# Patient Record
Sex: FEMALE | Race: WHITE | Employment: UNEMPLOYED | URBAN - METROPOLITAN AREA
[De-identification: names, ages, dates, MRNs, and addresses within clinical notes are randomized per-mention and may not be internally consistent; named-entity substitution may affect disease eponyms.]

---

## 2017-02-08 ENCOUNTER — HOSPITAL ENCOUNTER (EMERGENCY)
Age: 27
Discharge: HOME | End: 2017-02-08
Attending: EMERGENCY MEDICINE | Admitting: EMERGENCY MEDICINE
Payer: COMMERCIAL

## 2017-02-08 ENCOUNTER — APPOINTMENT (OUTPATIENT)
Dept: GENERAL RADIOLOGY | Age: 27
End: 2017-02-08
Attending: PHYSICIAN ASSISTANT
Payer: COMMERCIAL

## 2017-02-08 VITALS
SYSTOLIC BLOOD PRESSURE: 119 MMHG | DIASTOLIC BLOOD PRESSURE: 83 MMHG | RESPIRATION RATE: 18 BRPM | TEMPERATURE: 97.7 F | OXYGEN SATURATION: 98 % | HEART RATE: 118 BPM | WEIGHT: 181 LBS | BODY MASS INDEX: 29.09 KG/M2 | HEIGHT: 66 IN

## 2017-02-08 DIAGNOSIS — B34.9 VIRAL SYNDROME: Primary | ICD-10-CM

## 2017-02-08 LAB
ALBUMIN SERPL BCP-MCNC: 3.9 G/DL (ref 3.5–5)
ALBUMIN/GLOB SERPL: 1.1 {RATIO} (ref 1.1–2.2)
ALP SERPL-CCNC: 47 U/L (ref 45–117)
ALT SERPL-CCNC: 37 U/L (ref 12–78)
ANION GAP BLD CALC-SCNC: 6 MMOL/L (ref 5–15)
APPEARANCE UR: CLEAR
AST SERPL W P-5'-P-CCNC: 27 U/L (ref 15–37)
BACTERIA URNS QL MICRO: ABNORMAL /HPF
BASOPHILS # BLD AUTO: 0 K/UL (ref 0–0.1)
BASOPHILS # BLD: 0 % (ref 0–1)
BILIRUB SERPL-MCNC: 0.3 MG/DL (ref 0.2–1)
BILIRUB UR QL: NEGATIVE
BUN SERPL-MCNC: 7 MG/DL (ref 6–20)
BUN/CREAT SERPL: 7 (ref 12–20)
CALCIUM SERPL-MCNC: 8.5 MG/DL (ref 8.5–10.1)
CHLORIDE SERPL-SCNC: 109 MMOL/L (ref 97–108)
CO2 SERPL-SCNC: 25 MMOL/L (ref 21–32)
COLOR UR: ABNORMAL
CREAT SERPL-MCNC: 0.99 MG/DL (ref 0.55–1.02)
DIFFERENTIAL METHOD BLD: ABNORMAL
EOSINOPHIL # BLD: 0.1 K/UL (ref 0–0.4)
EOSINOPHIL NFR BLD: 2 % (ref 0–7)
EPITH CASTS URNS QL MICRO: ABNORMAL /LPF
ERYTHROCYTE [DISTWIDTH] IN BLOOD BY AUTOMATED COUNT: 13.3 % (ref 11.5–14.5)
FLUAV AG NPH QL IA: NEGATIVE
FLUBV AG NOSE QL IA: NEGATIVE
GLOBULIN SER CALC-MCNC: 3.7 G/DL (ref 2–4)
GLUCOSE SERPL-MCNC: 111 MG/DL (ref 65–100)
GLUCOSE UR STRIP.AUTO-MCNC: NEGATIVE MG/DL
HCG UR QL: NEGATIVE
HCT VFR BLD AUTO: 43.4 % (ref 35–47)
HGB BLD-MCNC: 14.1 G/DL (ref 11.5–16)
HGB UR QL STRIP: ABNORMAL
KETONES UR QL STRIP.AUTO: NEGATIVE MG/DL
LEUKOCYTE ESTERASE UR QL STRIP.AUTO: NEGATIVE
LYMPHOCYTES # BLD AUTO: 11 % (ref 12–49)
LYMPHOCYTES # BLD: 0.8 K/UL (ref 0.8–3.5)
MCH RBC QN AUTO: 29.7 PG (ref 26–34)
MCHC RBC AUTO-ENTMCNC: 32.5 G/DL (ref 30–36.5)
MCV RBC AUTO: 91.6 FL (ref 80–99)
MONOCYTES # BLD: 0.6 K/UL (ref 0–1)
MONOCYTES NFR BLD AUTO: 9 % (ref 5–13)
MUCOUS THREADS URNS QL MICRO: ABNORMAL /LPF
NEUTS SEG # BLD: 5.4 K/UL (ref 1.8–8)
NEUTS SEG NFR BLD AUTO: 78 % (ref 32–75)
NITRITE UR QL STRIP.AUTO: NEGATIVE
PH UR STRIP: 5.5 [PH] (ref 5–8)
PLATELET # BLD AUTO: 227 K/UL (ref 150–400)
POTASSIUM SERPL-SCNC: 3.4 MMOL/L (ref 3.5–5.1)
PROT SERPL-MCNC: 7.6 G/DL (ref 6.4–8.2)
PROT UR STRIP-MCNC: 30 MG/DL
RBC # BLD AUTO: 4.74 M/UL (ref 3.8–5.2)
RBC #/AREA URNS HPF: ABNORMAL /HPF (ref 0–5)
RBC MORPH BLD: ABNORMAL
SODIUM SERPL-SCNC: 140 MMOL/L (ref 136–145)
SP GR UR REFRACTOMETRY: 1.01 (ref 1–1.03)
UA: UC IF INDICATED,UAUC: ABNORMAL
UROBILINOGEN UR QL STRIP.AUTO: 1 EU/DL (ref 0.2–1)
WBC # BLD AUTO: 6.9 K/UL (ref 3.6–11)
WBC URNS QL MICRO: ABNORMAL /HPF (ref 0–4)

## 2017-02-08 PROCEDURE — 87804 INFLUENZA ASSAY W/OPTIC: CPT | Performed by: PHYSICIAN ASSISTANT

## 2017-02-08 PROCEDURE — 81025 URINE PREGNANCY TEST: CPT | Performed by: EMERGENCY MEDICINE

## 2017-02-08 PROCEDURE — 85025 COMPLETE CBC W/AUTO DIFF WBC: CPT | Performed by: EMERGENCY MEDICINE

## 2017-02-08 PROCEDURE — 99283 EMERGENCY DEPT VISIT LOW MDM: CPT

## 2017-02-08 PROCEDURE — 80053 COMPREHEN METABOLIC PANEL: CPT | Performed by: EMERGENCY MEDICINE

## 2017-02-08 PROCEDURE — 87086 URINE CULTURE/COLONY COUNT: CPT | Performed by: EMERGENCY MEDICINE

## 2017-02-08 PROCEDURE — 74011250636 HC RX REV CODE- 250/636: Performed by: PHYSICIAN ASSISTANT

## 2017-02-08 PROCEDURE — 71020 XR CHEST PA LAT: CPT

## 2017-02-08 PROCEDURE — 96360 HYDRATION IV INFUSION INIT: CPT

## 2017-02-08 PROCEDURE — 81001 URINALYSIS AUTO W/SCOPE: CPT | Performed by: EMERGENCY MEDICINE

## 2017-02-08 PROCEDURE — 36415 COLL VENOUS BLD VENIPUNCTURE: CPT | Performed by: EMERGENCY MEDICINE

## 2017-02-08 RX ORDER — SODIUM CHLORIDE 0.9 % (FLUSH) 0.9 %
5-10 SYRINGE (ML) INJECTION AS NEEDED
Status: DISCONTINUED | OUTPATIENT
Start: 2017-02-08 | End: 2017-02-08 | Stop reason: HOSPADM

## 2017-02-08 RX ORDER — ONDANSETRON 4 MG/1
4 TABLET, ORALLY DISINTEGRATING ORAL
Qty: 10 TAB | Refills: 0 | Status: SHIPPED | OUTPATIENT
Start: 2017-02-08 | End: 2017-06-20

## 2017-02-08 RX ORDER — SODIUM CHLORIDE 0.9 % (FLUSH) 0.9 %
5-10 SYRINGE (ML) INJECTION EVERY 8 HOURS
Status: DISCONTINUED | OUTPATIENT
Start: 2017-02-08 | End: 2017-02-08 | Stop reason: HOSPADM

## 2017-02-08 RX ADMIN — SODIUM CHLORIDE 1000 ML: 900 INJECTION, SOLUTION INTRAVENOUS at 13:55

## 2017-02-08 NOTE — LETTER
[address] 
\[facility]\"" EMERGENCY DEPT 
[address] [place] [address] [id number] [phone] Work/School Note Date: 2/8/2017 To Whom It May concern: 
 
Heriberto Charles was seen and treated today in the emergency room by the following provider(s): 
Attending Provider: Tuan Lanier MD 
Physician Assistant: FANNIE Mckeon. Please excuse Heriberto Charles from work today and tomorrow. Sincerely, Nathaniel Diaz, [phone]

## 2017-02-08 NOTE — ED PROVIDER NOTES
HPI Comments: Rolando Suarez is a 27y.o. year old female that presents to the ED with multiple medical complaints. Pt notes she began feeling ill about 4 days ago. She has had subjective fevers, rhinorrhea, dry cough and vomiting. Pt has tried taking over the counter cough/cold medications without relief. Pt denies all other complaints. Denies sick contacts. Denies potentially rancid foods. Pt did not receive a flu vaccine this year. PCP: Giulia Jorge MD  Social Hx: Tob (denies), EtOH (denies), Drug Use (denies)     Patient is a 27 y.o. female presenting with vomiting and fever. The history is provided by the patient. Vomiting    Associated symptoms include a fever and cough. Pertinent negatives include no chills, no abdominal pain and no diarrhea. Fever    Associated symptoms include vomiting, congestion and cough. Pertinent negatives include no chest pain, no diarrhea, no sore throat, no shortness of breath and no rash. Past Medical History:   Diagnosis Date    Other ill-defined conditions(989.89)      kidney infections    Other ill-defined conditions(799.89)      Migraines       Past Surgical History:   Procedure Laterality Date    Hx orthopaedic       pelvic bone fx         History reviewed. No pertinent family history. Social History     Social History    Marital status: SINGLE     Spouse name: N/A    Number of children: N/A    Years of education: N/A     Occupational History    Not on file. Social History Main Topics    Smoking status: Never Smoker    Smokeless tobacco: Never Used    Alcohol use No    Drug use: No    Sexual activity: Yes     Birth control/ protection: Injection     Other Topics Concern    Not on file     Social History Narrative         ALLERGIES: Ciprofloxacin and Pcn [penicillins]    Review of Systems   Constitutional: Positive for fever. Negative for chills. HENT: Positive for congestion and rhinorrhea. Negative for ear pain and sore throat. Respiratory: Positive for cough. Negative for shortness of breath. Cardiovascular: Negative for chest pain. Gastrointestinal: Positive for nausea and vomiting. Negative for abdominal pain, constipation and diarrhea. Genitourinary: Negative. Negative for dysuria, frequency, hematuria, vaginal bleeding, vaginal discharge and vaginal pain. Skin: Negative. Negative for rash. All other systems reviewed and are negative. Vitals:    02/08/17 1152 02/08/17 1245   BP: 120/90 (!) 109/92   Pulse: (!) 105 (!) 118   Resp: 16 16   Temp: 97.7 °F (36.5 °C)    SpO2: 99% 98%   Weight: 82.1 kg (181 lb)    Height: 5' 6\" (1.676 m)             Physical Exam   Constitutional: She is oriented to person, place, and time. She appears well-developed and well-nourished. No distress. 26 yo  female   HENT:   Head: Normocephalic and atraumatic. Right Ear: Tympanic membrane, external ear and ear canal normal. No middle ear effusion. Left Ear: Tympanic membrane, external ear and ear canal normal.  No middle ear effusion. Nose: Rhinorrhea present. Right sinus exhibits no maxillary sinus tenderness and no frontal sinus tenderness. Left sinus exhibits no maxillary sinus tenderness and no frontal sinus tenderness. Mouth/Throat: Uvula is midline and mucous membranes are normal. No oropharyngeal exudate, posterior oropharyngeal edema or posterior oropharyngeal erythema. Eyes: Conjunctivae are normal. Right eye exhibits no discharge. Left eye exhibits no discharge. Neck: Normal range of motion. Neck supple. Cardiovascular: Normal rate, regular rhythm and normal heart sounds. No murmur heard. Pulmonary/Chest: Effort normal and breath sounds normal. No respiratory distress. She has no wheezes. Abdominal: Soft. Bowel sounds are normal. She exhibits no distension. There is no tenderness. There is no rebound and no guarding. Lymphadenopathy:     She has no cervical adenopathy.    Neurological: She is alert and oriented to person, place, and time. Skin: Skin is warm and dry. She is not diaphoretic. Psychiatric: She has a normal mood and affect. Her behavior is normal.   Nursing note and vitals reviewed. MDM  Number of Diagnoses or Management Options  Diagnosis management comments: DDx - gastroenteritis, food poisoning, viral syndrome, influenza, UTI, URI       Amount and/or Complexity of Data Reviewed  Clinical lab tests: ordered and reviewed      ED Course       Procedures    DISCHARGE NOTE:  2:00 PM  The care plan has been outline with the patient and/or family, who verbally conveyed understanding and agreement. Available results have been reviewed. Patient and/or family understand the follow up plan as outlined and discharge instructions. Should their condition deterioration at any time after discharge the patient agrees to return, follow up sooner than outlined or seek medical assistance at the closest Emergency Room as soon as possible. Questions have been answered. Discharge instructions and educational information regarding the patient's diagnosis as well a list of reasons why the patient would want to seek immediate medical attention, should their condition change, were reviewed directly with the patient/family     LABS COMPLETED AND REVIEWED:  Recent Results (from the past 12 hour(s))   CBC WITH AUTOMATED DIFF    Collection Time: 02/08/17 12:00 PM   Result Value Ref Range    WBC 6.9 3.6 - 11.0 K/uL    RBC 4.74 3.80 - 5.20 M/uL    HGB 14.1 11.5 - 16.0 g/dL    HCT 43.4 35.0 - 47.0 %    MCV 91.6 80.0 - 99.0 FL    MCH 29.7 26.0 - 34.0 PG    MCHC 32.5 30.0 - 36.5 g/dL    RDW 13.3 11.5 - 14.5 %    PLATELET 310 861 - 943 K/uL    NEUTROPHILS 78 (H) 32 - 75 %    LYMPHOCYTES 11 (L) 12 - 49 %    MONOCYTES 9 5 - 13 %    EOSINOPHILS 2 0 - 7 %    BASOPHILS 0 0 - 1 %    ABS. NEUTROPHILS 5.4 1.8 - 8.0 K/UL    ABS. LYMPHOCYTES 0.8 0.8 - 3.5 K/UL    ABS. MONOCYTES 0.6 0.0 - 1.0 K/UL    ABS.  EOSINOPHILS 0.1 0.0 - 0.4 K/UL    ABS. BASOPHILS 0.0 0.0 - 0.1 K/UL    DF SMEAR SCANNED      RBC COMMENTS NORMOCYTIC, NORMOCHROMIC     METABOLIC PANEL, COMPREHENSIVE    Collection Time: 02/08/17 12:00 PM   Result Value Ref Range    Sodium 140 136 - 145 mmol/L    Potassium 3.4 (L) 3.5 - 5.1 mmol/L    Chloride 109 (H) 97 - 108 mmol/L    CO2 25 21 - 32 mmol/L    Anion gap 6 5 - 15 mmol/L    Glucose 111 (H) 65 - 100 mg/dL    BUN 7 6 - 20 MG/DL    Creatinine 0.99 0.55 - 1.02 MG/DL    BUN/Creatinine ratio 7 (L) 12 - 20      GFR est AA >60 >60 ml/min/1.73m2    GFR est non-AA >60 >60 ml/min/1.73m2    Calcium 8.5 8.5 - 10.1 MG/DL    Bilirubin, total 0.3 0.2 - 1.0 MG/DL    ALT (SGPT) 37 12 - 78 U/L    AST (SGOT) 27 15 - 37 U/L    Alk.  phosphatase 47 45 - 117 U/L    Protein, total 7.6 6.4 - 8.2 g/dL    Albumin 3.9 3.5 - 5.0 g/dL    Globulin 3.7 2.0 - 4.0 g/dL    A-G Ratio 1.1 1.1 - 2.2     URINALYSIS W/ REFLEX CULTURE    Collection Time: 02/08/17 12:10 PM   Result Value Ref Range    Color YELLOW/STRAW      Appearance CLEAR CLEAR      Specific gravity 1.015 1.003 - 1.030      pH (UA) 5.5 5.0 - 8.0      Protein 30 (A) NEG mg/dL    Glucose NEGATIVE  NEG mg/dL    Ketone NEGATIVE  NEG mg/dL    Bilirubin NEGATIVE  NEG      Blood MODERATE (A) NEG      Urobilinogen 1.0 0.2 - 1.0 EU/dL    Nitrites NEGATIVE  NEG      Leukocyte Esterase NEGATIVE  NEG      WBC 5-10 0 - 4 /hpf    RBC 5-10 0 - 5 /hpf    Epithelial cells FEW FEW /lpf    Bacteria 1+ (A) NEG /hpf    UA:UC IF INDICATED URINE CULTURE ORDERED (A) CNI      Mucus 1+ (A) NEG /lpf   HCG URINE, QL    Collection Time: 02/08/17 12:10 PM   Result Value Ref Range    HCG urine, Ql. NEGATIVE  NEG     INFLUENZA A & B AG (RAPID TEST)    Collection Time: 02/08/17  1:25 PM   Result Value Ref Range    Influenza A Antigen NEGATIVE  NEG      Influenza B Antigen NEGATIVE  NEG         IMAGING COMPLETED AND REVIEWED:  INDICATION: cough/fever      COMPARISON: 10/23/2014     FINDINGS: PA and lateral views of the chest demonstrate a stable  cardiomediastinal silhouette and clear lungs bilaterally. The visualized osseous  structures are unremarkable.     IMPRESSION  IMPRESSION: No acute process    CLINICAL IMPRESSION:  1. Viral syndrome        Plan:  Over the counter cough/cold medication as needed  Discharge Medication List as of 2/8/2017  3:59 PM      START taking these medications    Details   ondansetron (ZOFRAN ODT) 4 mg disintegrating tablet Take 1 Tab by mouth every eight (8) hours as needed for Nausea. , Print, Disp-10 Tab, R-0           Follow-up Information     Follow up With Details Comments Contact Info    Your PCP In 2 days As needed         Return to the closest emergency room or follow up sooner for any deterioration

## 2017-02-10 LAB
BACTERIA SPEC CULT: NORMAL
CC UR VC: NORMAL
SERVICE CMNT-IMP: NORMAL

## 2017-06-20 ENCOUNTER — APPOINTMENT (OUTPATIENT)
Dept: ULTRASOUND IMAGING | Age: 27
End: 2017-06-20
Attending: EMERGENCY MEDICINE
Payer: COMMERCIAL

## 2017-06-20 ENCOUNTER — HOSPITAL ENCOUNTER (EMERGENCY)
Age: 27
Discharge: HOME | End: 2017-06-20
Attending: EMERGENCY MEDICINE | Admitting: EMERGENCY MEDICINE
Payer: COMMERCIAL

## 2017-06-20 VITALS
RESPIRATION RATE: 12 BRPM | BODY MASS INDEX: 29.76 KG/M2 | WEIGHT: 185.19 LBS | TEMPERATURE: 98 F | HEART RATE: 59 BPM | DIASTOLIC BLOOD PRESSURE: 80 MMHG | OXYGEN SATURATION: 100 % | SYSTOLIC BLOOD PRESSURE: 114 MMHG | HEIGHT: 66 IN

## 2017-06-20 DIAGNOSIS — N83.202 CYST OF LEFT OVARY: ICD-10-CM

## 2017-06-20 DIAGNOSIS — N92.1 MENORRHAGIA WITH IRREGULAR CYCLE: Primary | ICD-10-CM

## 2017-06-20 LAB
ALBUMIN SERPL BCP-MCNC: 3.2 G/DL (ref 3.5–5)
ALBUMIN/GLOB SERPL: 0.9 {RATIO} (ref 1.1–2.2)
ALP SERPL-CCNC: 43 U/L (ref 45–117)
ALT SERPL-CCNC: 19 U/L (ref 12–78)
ANION GAP BLD CALC-SCNC: 7 MMOL/L (ref 5–15)
APPEARANCE UR: CLEAR
AST SERPL W P-5'-P-CCNC: 11 U/L (ref 15–37)
BACTERIA URNS QL MICRO: NEGATIVE /HPF
BASOPHILS # BLD AUTO: 0 K/UL (ref 0–0.1)
BASOPHILS # BLD: 0 % (ref 0–1)
BILIRUB SERPL-MCNC: 0.2 MG/DL (ref 0.2–1)
BILIRUB UR QL: NEGATIVE
BUN SERPL-MCNC: 10 MG/DL (ref 6–20)
BUN/CREAT SERPL: 13 (ref 12–20)
CALCIUM SERPL-MCNC: 8.3 MG/DL (ref 8.5–10.1)
CHLORIDE SERPL-SCNC: 108 MMOL/L (ref 97–108)
CLUE CELLS VAG QL WET PREP: NORMAL
CO2 SERPL-SCNC: 25 MMOL/L (ref 21–32)
COLOR UR: ABNORMAL
CREAT SERPL-MCNC: 0.78 MG/DL (ref 0.55–1.02)
EOSINOPHIL # BLD: 0.2 K/UL (ref 0–0.4)
EOSINOPHIL NFR BLD: 3 % (ref 0–7)
EPITH CASTS URNS QL MICRO: ABNORMAL /LPF
ERYTHROCYTE [DISTWIDTH] IN BLOOD BY AUTOMATED COUNT: 12.9 % (ref 11.5–14.5)
GLOBULIN SER CALC-MCNC: 3.4 G/DL (ref 2–4)
GLUCOSE SERPL-MCNC: 73 MG/DL (ref 65–100)
GLUCOSE UR STRIP.AUTO-MCNC: NEGATIVE MG/DL
HCG UR QL: NEGATIVE
HCT VFR BLD AUTO: 37.8 % (ref 35–47)
HGB BLD-MCNC: 12.6 G/DL (ref 11.5–16)
HGB UR QL STRIP: ABNORMAL
HYALINE CASTS URNS QL MICRO: ABNORMAL /LPF (ref 0–5)
KETONES UR QL STRIP.AUTO: NEGATIVE MG/DL
KOH PREP SPEC: NORMAL
LEUKOCYTE ESTERASE UR QL STRIP.AUTO: ABNORMAL
LYMPHOCYTES # BLD AUTO: 21 % (ref 12–49)
LYMPHOCYTES # BLD: 1.5 K/UL (ref 0.8–3.5)
MCH RBC QN AUTO: 30.4 PG (ref 26–34)
MCHC RBC AUTO-ENTMCNC: 33.3 G/DL (ref 30–36.5)
MCV RBC AUTO: 91.1 FL (ref 80–99)
MONOCYTES # BLD: 0.5 K/UL (ref 0–1)
MONOCYTES NFR BLD AUTO: 7 % (ref 5–13)
NEUTS SEG # BLD: 4.7 K/UL (ref 1.8–8)
NEUTS SEG NFR BLD AUTO: 69 % (ref 32–75)
NITRITE UR QL STRIP.AUTO: NEGATIVE
PH UR STRIP: 5.5 [PH] (ref 5–8)
PLATELET # BLD AUTO: 221 K/UL (ref 150–400)
POTASSIUM SERPL-SCNC: 3.4 MMOL/L (ref 3.5–5.1)
PROT SERPL-MCNC: 6.6 G/DL (ref 6.4–8.2)
PROT UR STRIP-MCNC: NEGATIVE MG/DL
RBC # BLD AUTO: 4.15 M/UL (ref 3.8–5.2)
RBC #/AREA URNS HPF: >100 /HPF (ref 0–5)
SERVICE CMNT-IMP: NORMAL
SODIUM SERPL-SCNC: 140 MMOL/L (ref 136–145)
SP GR UR REFRACTOMETRY: 1.01 (ref 1–1.03)
T VAGINALIS VAG QL WET PREP: NORMAL
UROBILINOGEN UR QL STRIP.AUTO: 0.2 EU/DL (ref 0.2–1)
WBC # BLD AUTO: 6.9 K/UL (ref 3.6–11)
WBC URNS QL MICRO: ABNORMAL /HPF (ref 0–4)

## 2017-06-20 PROCEDURE — 74011250636 HC RX REV CODE- 250/636: Performed by: EMERGENCY MEDICINE

## 2017-06-20 PROCEDURE — 76830 TRANSVAGINAL US NON-OB: CPT

## 2017-06-20 PROCEDURE — 81025 URINE PREGNANCY TEST: CPT

## 2017-06-20 PROCEDURE — 81001 URINALYSIS AUTO W/SCOPE: CPT | Performed by: EMERGENCY MEDICINE

## 2017-06-20 PROCEDURE — 80053 COMPREHEN METABOLIC PANEL: CPT | Performed by: EMERGENCY MEDICINE

## 2017-06-20 PROCEDURE — 96375 TX/PRO/DX INJ NEW DRUG ADDON: CPT

## 2017-06-20 PROCEDURE — 85025 COMPLETE CBC W/AUTO DIFF WBC: CPT | Performed by: EMERGENCY MEDICINE

## 2017-06-20 PROCEDURE — 96374 THER/PROPH/DIAG INJ IV PUSH: CPT

## 2017-06-20 PROCEDURE — 87491 CHLMYD TRACH DNA AMP PROBE: CPT | Performed by: EMERGENCY MEDICINE

## 2017-06-20 PROCEDURE — 36415 COLL VENOUS BLD VENIPUNCTURE: CPT | Performed by: EMERGENCY MEDICINE

## 2017-06-20 PROCEDURE — 76856 US EXAM PELVIC COMPLETE: CPT

## 2017-06-20 PROCEDURE — 99284 EMERGENCY DEPT VISIT MOD MDM: CPT

## 2017-06-20 PROCEDURE — 87210 SMEAR WET MOUNT SALINE/INK: CPT | Performed by: EMERGENCY MEDICINE

## 2017-06-20 RX ORDER — MEDROXYPROGESTERONE ACETATE 10 MG/1
10 TABLET ORAL 2 TIMES DAILY
Qty: 7 TAB | Refills: 0 | Status: SHIPPED | OUTPATIENT
Start: 2017-06-20 | End: 2017-11-25

## 2017-06-20 RX ORDER — FENTANYL CITRATE 50 UG/ML
50 INJECTION, SOLUTION INTRAMUSCULAR; INTRAVENOUS
Status: COMPLETED | OUTPATIENT
Start: 2017-06-20 | End: 2017-06-20

## 2017-06-20 RX ORDER — ONDANSETRON 4 MG/1
4 TABLET, ORALLY DISINTEGRATING ORAL
Qty: 10 TAB | Refills: 0 | Status: SHIPPED | OUTPATIENT
Start: 2017-06-20 | End: 2017-11-25

## 2017-06-20 RX ORDER — SODIUM CHLORIDE 0.9 % (FLUSH) 0.9 %
5-10 SYRINGE (ML) INJECTION AS NEEDED
Status: DISCONTINUED | OUTPATIENT
Start: 2017-06-20 | End: 2017-06-20 | Stop reason: HOSPADM

## 2017-06-20 RX ORDER — SODIUM CHLORIDE 0.9 % (FLUSH) 0.9 %
5-10 SYRINGE (ML) INJECTION EVERY 8 HOURS
Status: DISCONTINUED | OUTPATIENT
Start: 2017-06-20 | End: 2017-06-20 | Stop reason: HOSPADM

## 2017-06-20 RX ORDER — ONDANSETRON 2 MG/ML
4 INJECTION INTRAMUSCULAR; INTRAVENOUS
Status: COMPLETED | OUTPATIENT
Start: 2017-06-20 | End: 2017-06-20

## 2017-06-20 RX ORDER — KETOROLAC TROMETHAMINE 30 MG/ML
30 INJECTION, SOLUTION INTRAMUSCULAR; INTRAVENOUS
Status: COMPLETED | OUTPATIENT
Start: 2017-06-20 | End: 2017-06-20

## 2017-06-20 RX ORDER — HYDROCODONE BITARTRATE AND ACETAMINOPHEN 7.5; 325 MG/1; MG/1
1 TABLET ORAL
Qty: 20 TAB | Refills: 0 | Status: SHIPPED | OUTPATIENT
Start: 2017-06-20 | End: 2017-11-25

## 2017-06-20 RX ADMIN — ONDANSETRON HYDROCHLORIDE 4 MG: 2 INJECTION, SOLUTION INTRAMUSCULAR; INTRAVENOUS at 06:52

## 2017-06-20 RX ADMIN — KETOROLAC TROMETHAMINE 30 MG: 30 INJECTION, SOLUTION INTRAMUSCULAR at 06:52

## 2017-06-20 RX ADMIN — FENTANYL CITRATE 50 MCG: 50 INJECTION, SOLUTION INTRAMUSCULAR; INTRAVENOUS at 06:53

## 2017-06-20 NOTE — DISCHARGE INSTRUCTIONS
Abnormal Uterine Bleeding: Care Instructions  Your Care Instructions  Abnormal uterine bleeding (AUB) is irregular bleeding from the uterus that is longer or heavier than usual or does not occur at your regular time. Sometimes it is caused by changes in hormone levels. It can also be caused by growths in the uterus, such as fibroids or polyps. Sometimes a cause cannot be found. You may have heavy bleeding when you are not expecting your period. Your doctor may suggest a pregnancy test, if you think you are pregnant. Follow-up care is a key part of your treatment and safety. Be sure to make and go to all appointments, and call your doctor if you are having problems. It's also a good idea to know your test results and keep a list of the medicines you take. How can you care for yourself at home? · Be safe with medicines. Take pain medicines exactly as directed. ¨ If the doctor gave you a prescription medicine for pain, take it as prescribed. ¨ If you are not taking a prescription pain medicine, ask your doctor if you can take an over-the-counter medicine. · You may be low in iron because of blood loss. Eat a balanced diet that is high in iron and vitamin C. Foods rich in iron include red meat, shellfish, eggs, beans, and leafy green vegetables. Talk to your doctor about whether you need to take iron pills or a multivitamin. When should you call for help? Call 911 anytime you think you may need emergency care. For example, call if:  · You passed out (lost consciousness). Call your doctor now or seek immediate medical care if:  · You have sudden, severe pain in your belly or pelvis. · You have severe vaginal bleeding. You are soaking through your usual pads or tampons every hour for 2 or more hours. · You feel dizzy or lightheaded, or you feel like you may faint. Watch closely for changes in your health, and be sure to contact your doctor if:  · You have new belly or pelvic pain.   · You have a fever.  · Your bleeding gets worse or lasts longer than 1 week. · You think you may be pregnant. Where can you learn more? Go to http://vilma-francois.info/. Enter O291 in the search box to learn more about \"Abnormal Uterine Bleeding: Care Instructions. \"  Current as of: October 13, 2016  Content Version: 11.3  © 1249-9592 [employer]. Care instructions adapted under license by [employer] (which disclaims liability or warranty for this information). If you have questions about a medical condition or this instruction, always ask your healthcare professional. [place] [address] any warranty or liability for your use of this information.

## 2017-06-20 NOTE — ED TRIAGE NOTES
Chief Complaint: abnormal vaginal bleeding with cramping   Patient states \"finished Depo in December. Started having her period in March. For the past 4 months heavier first couple of days lasted 1 week. Sunday started spotting. Heavy vaginal bleeding started yesterday. Patient reports using 27 tampons and 15 pads since yesterday; changing tampon every 20 minutes cramping pain starts in front and radiates to the back  Onset Sunday June 18, 2017  Pt arrived via private car.

## 2017-06-20 NOTE — ED NOTES
Dr. Tarun Gutierrez at bedside to provide discharge paperwork. Vital signs stable. Pt in no apparent distress at this time. Mental status at baseline. Ambulatory to waiting room with steady gate, discharge paperwork in hand.

## 2017-06-20 NOTE — ED NOTES
Bedside and Verbal shift change report given to Conner Polk RN  (oncoming nurse) by Cherise Du RN  (offgoing nurse). Report included the following information SBAR, Kardex, ED Summary, Intake/Output, MAR, Recent Results and Med Rec Status.

## 2017-06-20 NOTE — LETTER
[address] 
[facility] EMERGENCY DEPT 
[address] [place] [address] [phone] [id number] [phone] Work/School Note Date: 6/20/2017 To Whom It May concern: 
 
Concetta Thomas was seen and treated today in the emergency room by the following provider(s): 
Attending Provider: Mando Marlow return to work on 6/22/2017.  
 
 
Sincerely, 
 
 
 
 
Freddy Camilo, DO

## 2017-06-20 NOTE — ED PROVIDER NOTES
HPI Comments: Vishal Nascimento is a 32 y.o. female with PMhx significant for kidney infections who presents ambulatory to the ED with cc of vaginal menstrual bleeding with associated abdominal pain starting 2 nights ago. She reports heavy bleeding that requires changing her tampon frequently, and states that she has used 27 tampons and 15 pads yesterday. Pt describes the blood as bright red with clots. She also reports some light-headedness. Pt says she discontinued her usage of the Depo-Provera birth control injections in December 2016, had no periods in January and February, and experienced heavy periods in the following months, with this current period being the worst yet. She denies fever, shortness of breath, nausea, vomiting, diarrhea, urinary symptoms, or additional unusual vaginal discharge. Social Hx: - Tobacco, - EtOH, - Illicit Drugs    PCP: Giulia Jorge MD    There are no other complaints, changes or physical findings at this time. The history is provided by the patient. No  was used. Past Medical History:   Diagnosis Date    Other ill-defined conditions     kidney infections    Other ill-defined conditions     Migraines       Past Surgical History:   Procedure Laterality Date    HX ORTHOPAEDIC      pelvic bone fx         History reviewed. No pertinent family history. Social History     Social History    Marital status: SINGLE     Spouse name: N/A    Number of children: N/A    Years of education: N/A     Occupational History    Not on file. Social History Main Topics    Smoking status: Never Smoker    Smokeless tobacco: Never Used    Alcohol use No    Drug use: No    Sexual activity: Yes     Birth control/ protection: Injection     Other Topics Concern    Not on file     Social History Narrative         ALLERGIES: Ciprofloxacin and Pcn [penicillins]    Review of Systems   Constitutional: Negative.   Negative for appetite change, chills, fatigue and fever.   HENT: Negative. Negative for congestion, rhinorrhea, sinus pressure and sore throat. Eyes: Negative. Respiratory: Negative. Negative for cough, choking, chest tightness, shortness of breath and wheezing. Cardiovascular: Negative. Negative for chest pain, palpitations and leg swelling. Gastrointestinal: Positive for abdominal pain. Negative for constipation, diarrhea, nausea and vomiting. Endocrine: Negative. Genitourinary: Positive for vaginal bleeding. Negative for difficulty urinating, dysuria, flank pain, urgency and vaginal discharge. Musculoskeletal: Negative. Skin: Negative. Neurological: Negative. Negative for dizziness, speech difficulty, weakness, light-headedness, numbness and headaches. Psychiatric/Behavioral: Negative. All other systems reviewed and are negative. Patient Vitals for the past 12 hrs:   Temp Pulse Resp BP SpO2   06/20/17 1130 - - - 114/80 100 %   06/20/17 0930 - (!) 59 - 111/69 100 %   06/20/17 0701 - - - - 97 %   06/20/17 0700 - - - 133/64 -   06/20/17 0617 98 °F (36.7 °C) 74 12 (!) 141/106 100 %            Physical Exam   Constitutional: She is oriented to person, place, and time. She appears well-developed and well-nourished. No distress. HENT:   Head: Normocephalic and atraumatic. Mouth/Throat: Oropharynx is clear and moist. No oropharyngeal exudate. Eyes: Conjunctivae and EOM are normal. Pupils are equal, round, and reactive to light. Neck: Normal range of motion. Neck supple. No JVD present. No tracheal deviation present. Cardiovascular: Normal rate, regular rhythm, normal heart sounds and intact distal pulses. No murmur heard. Pulmonary/Chest: Effort normal and breath sounds normal. No stridor. No respiratory distress. She has no wheezes. She has no rales. She exhibits no tenderness. Abdominal: Soft. She exhibits no distension. There is tenderness (left suprapubic pain). There is no rebound and no guarding. Genitourinary:   Genitourinary Comments: Pelvic exam findings: Maroon blood in the vault, no CMT, no tenderness over the R adnexa, but there is tenderness over the left adnexa   Musculoskeletal: Normal range of motion. She exhibits no edema or tenderness. Neurological: She is alert and oriented to person, place, and time. No cranial nerve deficit. No gross motor or sensory deficits    Skin: Skin is warm and dry. She is not diaphoretic. Psychiatric: She has a normal mood and affect. Her behavior is normal.   Nursing note and vitals reviewed. MDM  Number of Diagnoses or Management Options  Cyst of left ovary:   Menorrhagia with irregular cycle:   Diagnosis management comments: DDx: Metromenorrhagia, dysmenorrhea, UTI, vaginitis, acute blood loss anemia       Amount and/or Complexity of Data Reviewed  Clinical lab tests: ordered and reviewed  Tests in the radiology section of CPT®: ordered and reviewed  Review and summarize past medical records: yes    Patient Progress  Patient progress: stable    ED Course       Procedures    Procedure Note - Pelvic Exam:    7:33 AM  Performed by: Chaya Odom DO  Chaperoned by: Greg Ho RN (female)  Pelvic exam was performed using bimanual and speculum. Small amount of maroon blood in vault, no CMT, + left adnexal ttp. The procedure took 1-15 minutes, and pt tolerated well.     LABORATORY TESTS:  Recent Results (from the past 12 hour(s))   URINALYSIS W/MICROSCOPIC    Collection Time: 06/20/17  6:51 AM   Result Value Ref Range    Color YELLOW/STRAW      Appearance CLEAR CLEAR      Specific gravity 1.013 1.003 - 1.030      pH (UA) 5.5 5.0 - 8.0      Protein NEGATIVE  NEG mg/dL    Glucose NEGATIVE  NEG mg/dL    Ketone NEGATIVE  NEG mg/dL    Bilirubin NEGATIVE  NEG      Blood LARGE (A) NEG      Urobilinogen 0.2 0.2 - 1.0 EU/dL    Nitrites NEGATIVE  NEG      Leukocyte Esterase TRACE (A) NEG      WBC 0-4 0 - 4 /hpf    RBC >100 (H) 0 - 5 /hpf    Epithelial cells FEW FEW /lpf    Bacteria NEGATIVE  NEG /hpf    Hyaline cast 0-2 0 - 5 /lpf   CBC WITH AUTOMATED DIFF    Collection Time: 06/20/17  6:51 AM   Result Value Ref Range    WBC 6.9 3.6 - 11.0 K/uL    RBC 4.15 3.80 - 5.20 M/uL    HGB 12.6 11.5 - 16.0 g/dL    HCT 37.8 35.0 - 47.0 %    MCV 91.1 80.0 - 99.0 FL    MCH 30.4 26.0 - 34.0 PG    MCHC 33.3 30.0 - 36.5 g/dL    RDW 12.9 11.5 - 14.5 %    PLATELET 034 008 - 985 K/uL    NEUTROPHILS 69 32 - 75 %    LYMPHOCYTES 21 12 - 49 %    MONOCYTES 7 5 - 13 %    EOSINOPHILS 3 0 - 7 %    BASOPHILS 0 0 - 1 %    ABS. NEUTROPHILS 4.7 1.8 - 8.0 K/UL    ABS. LYMPHOCYTES 1.5 0.8 - 3.5 K/UL    ABS. MONOCYTES 0.5 0.0 - 1.0 K/UL    ABS. EOSINOPHILS 0.2 0.0 - 0.4 K/UL    ABS. BASOPHILS 0.0 0.0 - 0.1 K/UL   METABOLIC PANEL, COMPREHENSIVE    Collection Time: 06/20/17  6:51 AM   Result Value Ref Range    Sodium 140 136 - 145 mmol/L    Potassium 3.4 (L) 3.5 - 5.1 mmol/L    Chloride 108 97 - 108 mmol/L    CO2 25 21 - 32 mmol/L    Anion gap 7 5 - 15 mmol/L    Glucose 73 65 - 100 mg/dL    BUN 10 6 - 20 MG/DL    Creatinine 0.78 0.55 - 1.02 MG/DL    BUN/Creatinine ratio 13 12 - 20      GFR est AA >60 >60 ml/min/1.73m2    GFR est non-AA >60 >60 ml/min/1.73m2    Calcium 8.3 (L) 8.5 - 10.1 MG/DL    Bilirubin, total 0.2 0.2 - 1.0 MG/DL    ALT (SGPT) 19 12 - 78 U/L    AST (SGOT) 11 (L) 15 - 37 U/L    Alk.  phosphatase 43 (L) 45 - 117 U/L    Protein, total 6.6 6.4 - 8.2 g/dL    Albumin 3.2 (L) 3.5 - 5.0 g/dL    Globulin 3.4 2.0 - 4.0 g/dL    A-G Ratio 0.9 (L) 1.1 - 2.2     HCG URINE, QL. - POC    Collection Time: 06/20/17  7:13 AM   Result Value Ref Range    Pregnancy test,urine (POC) NEGATIVE  NEG     WET PREP    Collection Time: 06/20/17  7:31 AM   Result Value Ref Range    Clue cells CLUE CELLS ABSENT      Wet prep NO TRICHOMONAS SEEN     KOH, OTHER SOURCES    Collection Time: 06/20/17  7:31 AM   Result Value Ref Range    Special Requests: NO SPECIAL REQUESTS      KOH NO YEAST SEEN         IMAGING RESULTS:  US TRANSVAGINAL   Final Result   CLINICAL HISTORY: Vaginal bleeding, left adnexal pain     Pelvic ultrasound: Realtime sonographic imaging of the pelvis was performed  transabdominally. The uterus measures 7.7 x 4.4 x 3.3 cm and is normal in  appearance. The endometrial stripe measures 4 mm. The ovaries are not visualized  due to bowel gas. No free fluid.      IMPRESSION  IMPRESSION: Nonvisualization of the ovaries otherwise unremarkable. Correlation  with transvaginal ultrasound will be performed.     Transvaginal ultrasound: Realtime sonographic imaging of the pelvis was  performed transvaginally. The uterus is normal in appearance. The endometrial  stripe measures 4 mm. The right ovary measures 2.7 x 2.3 x 1.5 cm and the left  ovary measures 2.7 x 1.9 x 1.7 cm. There is a 1.2 cm simple cyst in the left  ovary. The right ovary is normal in appearance. Blood flow is documented within  both ovaries. No free fluid.      IMPRESSION: Follicular cyst left ovary otherwise unremarkable. US PELV NON OBS   Final Result   CLINICAL HISTORY: Vaginal bleeding, left adnexal pain     Pelvic ultrasound: Realtime sonographic imaging of the pelvis was performed  transabdominally. The uterus measures 7.7 x 4.4 x 3.3 cm and is normal in  appearance. The endometrial stripe measures 4 mm. The ovaries are not visualized  due to bowel gas. No free fluid.      IMPRESSION  IMPRESSION: Nonvisualization of the ovaries otherwise unremarkable. Correlation  with transvaginal ultrasound will be performed.     Transvaginal ultrasound: Realtime sonographic imaging of the pelvis was  performed transvaginally. The uterus is normal in appearance. The endometrial  stripe measures 4 mm. The right ovary measures 2.7 x 2.3 x 1.5 cm and the left  ovary measures 2.7 x 1.9 x 1.7 cm. There is a 1.2 cm simple cyst in the left  ovary. The right ovary is normal in appearance. Blood flow is documented within  both ovaries. No free fluid.    IMPRESSION: Follicular cyst left ovary otherwise unremarkable. MEDICATIONS GIVEN:  Medications   sodium chloride (NS) flush 5-10 mL (not administered)   sodium chloride (NS) flush 5-10 mL (not administered)   sodium chloride 0.9 % bolus infusion 1,000 mL (not administered)   ketorolac (TORADOL) injection 30 mg (30 mg IntraVENous Given 6/20/17 0252)   fentaNYL citrate (PF) injection 50 mcg (50 mcg IntraVENous Given 6/20/17 9381)   ondansetron (ZOFRAN) injection 4 mg (4 mg IntraVENous Given 6/20/17 8449)       IMPRESSION:  1. Menorrhagia with irregular cycle    2. Cyst of left ovary        PLAN:  1. Discharge Medication List as of 6/20/2017 12:04 PM      START taking these medications    Details   HYDROcodone-acetaminophen (NORCO) 7.5-325 mg per tablet Take 1 Tab by mouth every six (6) hours as needed for Pain. Max Daily Amount: 4 Tabs., Print, Disp-20 Tab, R-0      medroxyPROGESTERone (PROVERA) 10 mg tablet Take 1 Tab by mouth two (2) times a day., Print, Disp-7 Tab, R-0         CONTINUE these medications which have CHANGED    Details   ondansetron (ZOFRAN ODT) 4 mg disintegrating tablet Take 1 Tab by mouth every eight (8) hours as needed for Nausea. , Print, Disp-10 Tab, R-0           2. Follow-up Information     Follow up With Details Comments Contact Info    [facility] EMERGENCY DEPT  If symptoms worsen [address] [place] [address]  [phone]        Return to ED if worse     Discharge Note:  12:13 PM  The patient has been re-evaluated and is ready for discharge. Reviewed available results with patient. Counseled patient on diagnosis and care plan. Patient has expressed understanding, and all questions have been answered. Patient agrees with plan and agrees to follow up as recommended, or return to the ED if their symptoms worsen. Discharge instructions have been provided and explained to the patient, along with reasons to return to the ED. Attestation:   This note is prepared by Megan Garay Avtar Jordan and Coral Schilder, acting as Scribe for Sussy Murry, [address], : The scribe's documentation has been prepared under my direction and personally reviewed by me in its entirety. I confirm that the note above accurately reflects all work, treatment, procedures, and medical decision making performed by me.

## 2017-06-21 LAB
C TRACH DNA SPEC QL NAA+PROBE: NEGATIVE
N GONORRHOEA DNA SPEC QL NAA+PROBE: NEGATIVE
SAMPLE TYPE: NORMAL
SERVICE CMNT-IMP: NORMAL
SPECIMEN SOURCE: NORMAL

## 2017-11-25 ENCOUNTER — HOSPITAL ENCOUNTER (EMERGENCY)
Age: 27
Discharge: HOME | End: 2017-11-25
Attending: FAMILY MEDICINE

## 2017-11-25 VITALS
BODY MASS INDEX: 30.05 KG/M2 | RESPIRATION RATE: 18 BRPM | SYSTOLIC BLOOD PRESSURE: 116 MMHG | WEIGHT: 187 LBS | HEART RATE: 100 BPM | TEMPERATURE: 97.5 F | OXYGEN SATURATION: 97 % | DIASTOLIC BLOOD PRESSURE: 68 MMHG | HEIGHT: 66 IN

## 2017-11-25 DIAGNOSIS — J01.00 ACUTE NON-RECURRENT MAXILLARY SINUSITIS: Primary | ICD-10-CM

## 2017-11-25 RX ORDER — PREDNISONE 5 MG/1
TABLET ORAL
Qty: 21 TAB | Refills: 0 | Status: SHIPPED | OUTPATIENT
Start: 2017-11-25 | End: 2017-12-20

## 2017-11-25 RX ORDER — DOXYCYCLINE 100 MG/1
100 CAPSULE ORAL 2 TIMES DAILY
Qty: 20 CAP | Refills: 0 | Status: SHIPPED | OUTPATIENT
Start: 2017-11-25 | End: 2017-12-05

## 2017-11-26 NOTE — DISCHARGE INSTRUCTIONS
Sinusitis: Care Instructions  Your Care Instructions    Sinusitis is an infection of the lining of the sinus cavities in your head. Sinusitis often follows a cold. It causes pain and pressure in your head and face. In most cases, sinusitis gets better on its own in 1 to 2 weeks. But some mild symptoms may last for several weeks. Sometimes antibiotics are needed. Follow-up care is a key part of your treatment and safety. Be sure to make and go to all appointments, and call your doctor if you are having problems. It's also a good idea to know your test results and keep a list of the medicines you take. How can you care for yourself at home? · Take an over-the-counter pain medicine, such as acetaminophen (Tylenol), ibuprofen (Advil, Motrin), or naproxen (Aleve). Read and follow all instructions on the label. · If the doctor prescribed antibiotics, take them as directed. Do not stop taking them just because you feel better. You need to take the full course of antibiotics. · Be careful when taking over-the-counter cold or flu medicines and Tylenol at the same time. Many of these medicines have acetaminophen, which is Tylenol. Read the labels to make sure that you are not taking more than the recommended dose. Too much acetaminophen (Tylenol) can be harmful. · Breathe warm, moist air from a steamy shower, a hot bath, or a sink filled with hot water. Avoid cold, dry air. Using a humidifier in your home may help. Follow the directions for cleaning the machine. · Use saline (saltwater) nasal washes to help keep your nasal passages open and wash out mucus and bacteria. You can buy saline nose drops at a grocery store or drugstore. Or you can make your own at home by adding 1 teaspoon of salt and 1 teaspoon of baking soda to 2 cups of distilled water. If you make your own, fill a bulb syringe with the solution, insert the tip into your nostril, and squeeze gently. Sidonie Adrianne your nose.   · Put a hot, wet towel or a warm gel pack on your face 3 or 4 times a day for 5 to 10 minutes each time. · Try a decongestant nasal spray like oxymetazoline (Afrin). Do not use it for more than 3 days in a row. Using it for more than 3 days can make your congestion worse. When should you call for help? Call your doctor now or seek immediate medical care if:  ? · You have new or worse swelling or redness in your face or around your eyes. ? · You have a new or higher fever. ? Watch closely for changes in your health, and be sure to contact your doctor if:  ? · You have new or worse facial pain. ? · The mucus from your nose becomes thicker (like pus) or has new blood in it. ? · You are not getting better as expected. Where can you learn more? Go to http://vilma-francois.info/. Enter M833 in the search box to learn more about \"Sinusitis: Care Instructions. \"  Current as of: May 12, 2017  Content Version: 11.4  © 9569-0618 [employer]. Care instructions adapted under license by [employer] (which disclaims liability or warranty for this information). If you have questions about a medical condition or this instruction, always ask your healthcare professional. [address] any warranty or liability for your use of this information.

## 2017-11-26 NOTE — UC PROVIDER NOTE
Patient is a 32 y.o. female presenting with sinus pain. The history is provided by the patient. Sinus Pain    This is a new problem. Episode onset: 1 month ago. The problem has been gradually worsening. There has been no fever. The pain is mild. The pain has been constant since onset. Associated symptoms include congestion, sinus pressure, swollen glands, cough and rhinorrhea. Pertinent negatives include no chills, no sweats, no ear pain, no hoarse voice, no sore throat, no shortness of breath and no chest pain. She has tried decongestants for the symptoms. The treatment provided no relief. Past Medical History:   Diagnosis Date    Other ill-defined conditions(799.89)     kidney infections    Other ill-defined conditions(799.89)     Migraines        Past Surgical History:   Procedure Laterality Date    HX ORTHOPAEDIC      pelvic bone fx         History reviewed. No pertinent family history. Social History     Social History    Marital status: SINGLE     Spouse name: N/A    Number of children: N/A    Years of education: N/A     Occupational History    Not on file. Social History Main Topics    Smoking status: Never Smoker    Smokeless tobacco: Never Used    Alcohol use No    Drug use: No    Sexual activity: Yes     Birth control/ protection: Injection     Other Topics Concern    Not on file     Social History Narrative                ALLERGIES: Ciprofloxacin and Pcn [penicillins]    Review of Systems   Constitutional: Negative for chills and fever. HENT: Positive for congestion, rhinorrhea, sinus pain and sinus pressure. Negative for ear pain, hoarse voice and sore throat. Respiratory: Positive for cough. Negative for shortness of breath and wheezing. Cardiovascular: Negative for chest pain and palpitations. Gastrointestinal: Negative for nausea and vomiting. Musculoskeletal: Negative for myalgias. Skin: Negative for rash. Hematological: Positive for adenopathy. Vitals:    11/25/17 1858   BP: 116/68   Pulse: 100   Resp: 18   Temp: 97.5 °F (36.4 °C)   SpO2: 97%   Weight: 84.8 kg (187 lb)   Height: 5' 6\" (1.676 m)       Physical Exam   Constitutional: She appears well-developed and well-nourished. No distress. HENT:   Right Ear: Tympanic membrane, external ear and ear canal normal.   Left Ear: Tympanic membrane, external ear and ear canal normal.   Nose: Rhinorrhea present. Right sinus exhibits maxillary sinus tenderness. Left sinus exhibits maxillary sinus tenderness. Mouth/Throat: Mucous membranes are normal. Posterior oropharyngeal edema and posterior oropharyngeal erythema present. No oropharyngeal exudate or tonsillar abscesses. Cardiovascular: Normal rate, regular rhythm and normal heart sounds. Pulmonary/Chest: Effort normal and breath sounds normal. No respiratory distress. She has no wheezes. She has no rales. Lymphadenopathy:     She has cervical adenopathy. Neurological: She is alert. Skin: She is not diaphoretic. Psychiatric: She has a normal mood and affect. Her behavior is normal. Judgment and thought content normal.   Nursing note and vitals reviewed. MDM     Differential Diagnosis; Clinical Impression; Plan:     CLINICAL IMPRESSION:  Acute non-recurrent maxillary sinusitis  (primary encounter diagnosis)    Plan:  1. Doxycycline  2. pred ely  3. PCP [facility]  Risk of Significant Complications, Morbidity, and/or Mortality:   Presenting problems: Moderate  Management options:   Moderate  Progress:   Patient progress:  Stable      Procedures

## 2017-12-20 ENCOUNTER — APPOINTMENT (OUTPATIENT)
Dept: CT IMAGING | Age: 27
End: 2017-12-20
Attending: EMERGENCY MEDICINE
Payer: COMMERCIAL

## 2017-12-20 ENCOUNTER — HOSPITAL ENCOUNTER (EMERGENCY)
Age: 27
Discharge: HOME | End: 2017-12-20
Attending: EMERGENCY MEDICINE
Payer: COMMERCIAL

## 2017-12-20 VITALS
HEIGHT: 66 IN | RESPIRATION RATE: 16 BRPM | TEMPERATURE: 98.2 F | HEART RATE: 81 BPM | WEIGHT: 191 LBS | BODY MASS INDEX: 30.7 KG/M2 | DIASTOLIC BLOOD PRESSURE: 71 MMHG | OXYGEN SATURATION: 98 % | SYSTOLIC BLOOD PRESSURE: 100 MMHG

## 2017-12-20 DIAGNOSIS — R10.31 ABDOMINAL PAIN, RIGHT LOWER QUADRANT: ICD-10-CM

## 2017-12-20 DIAGNOSIS — R10.9 RIGHT FLANK PAIN: Primary | ICD-10-CM

## 2017-12-20 DIAGNOSIS — N73.9 PID (PELVIC INFLAMMATORY DISEASE): ICD-10-CM

## 2017-12-20 LAB
ALBUMIN SERPL-MCNC: 3.3 G/DL (ref 3.5–5)
ALBUMIN/GLOB SERPL: 0.9 {RATIO} (ref 1.1–2.2)
ALP SERPL-CCNC: 42 U/L (ref 45–117)
ALT SERPL-CCNC: 19 U/L (ref 12–78)
ANION GAP SERPL CALC-SCNC: 7 MMOL/L (ref 5–15)
APPEARANCE UR: ABNORMAL
AST SERPL-CCNC: 16 U/L (ref 15–37)
BACTERIA URNS QL MICRO: ABNORMAL /HPF
BASOPHILS # BLD: 0 K/UL (ref 0–0.1)
BASOPHILS NFR BLD: 0 % (ref 0–1)
BILIRUB SERPL-MCNC: 0.2 MG/DL (ref 0.2–1)
BILIRUB UR QL: NEGATIVE
BUN SERPL-MCNC: 10 MG/DL (ref 6–20)
BUN/CREAT SERPL: 13 (ref 12–20)
CALCIUM SERPL-MCNC: 8.2 MG/DL (ref 8.5–10.1)
CHLORIDE SERPL-SCNC: 110 MMOL/L (ref 97–108)
CLUE CELLS VAG QL WET PREP: NORMAL
CO2 SERPL-SCNC: 23 MMOL/L (ref 21–32)
COLOR UR: ABNORMAL
CREAT SERPL-MCNC: 0.8 MG/DL (ref 0.55–1.02)
EOSINOPHIL # BLD: 0.3 K/UL (ref 0–0.4)
EOSINOPHIL NFR BLD: 3 % (ref 0–7)
EPITH CASTS URNS QL MICRO: ABNORMAL /LPF
ERYTHROCYTE [DISTWIDTH] IN BLOOD BY AUTOMATED COUNT: 13.2 % (ref 11.5–14.5)
GLOBULIN SER CALC-MCNC: 3.7 G/DL (ref 2–4)
GLUCOSE SERPL-MCNC: 83 MG/DL (ref 65–100)
GLUCOSE UR STRIP.AUTO-MCNC: NEGATIVE MG/DL
HCG UR QL: NEGATIVE
HCT VFR BLD AUTO: 38.5 % (ref 35–47)
HGB BLD-MCNC: 12.7 G/DL (ref 11.5–16)
HGB UR QL STRIP: NEGATIVE
HYALINE CASTS URNS QL MICRO: ABNORMAL /LPF (ref 0–5)
KETONES UR QL STRIP.AUTO: NEGATIVE MG/DL
KOH PREP SPEC: NORMAL
LACTATE SERPL-SCNC: 1.6 MMOL/L (ref 0.4–2)
LEUKOCYTE ESTERASE UR QL STRIP.AUTO: ABNORMAL
LIPASE SERPL-CCNC: 133 U/L (ref 73–393)
LYMPHOCYTES # BLD: 1.5 K/UL (ref 0.8–3.5)
LYMPHOCYTES NFR BLD: 18 % (ref 12–49)
MAGNESIUM SERPL-MCNC: 2.1 MG/DL (ref 1.6–2.4)
MCH RBC QN AUTO: 29.7 PG (ref 26–34)
MCHC RBC AUTO-ENTMCNC: 33 G/DL (ref 30–36.5)
MCV RBC AUTO: 90 FL (ref 80–99)
MONOCYTES # BLD: 0.5 K/UL (ref 0–1)
MONOCYTES NFR BLD: 6 % (ref 5–13)
NEUTS SEG # BLD: 6.1 K/UL (ref 1.8–8)
NEUTS SEG NFR BLD: 73 % (ref 32–75)
NITRITE UR QL STRIP.AUTO: NEGATIVE
PH UR STRIP: 6 [PH] (ref 5–8)
PLATELET # BLD AUTO: 268 K/UL (ref 150–400)
POTASSIUM SERPL-SCNC: 3.8 MMOL/L (ref 3.5–5.1)
POTASSIUM SERPL-SCNC: 3.8 MMOL/L (ref 3.5–5.1)
PROT SERPL-MCNC: 7 G/DL (ref 6.4–8.2)
PROT UR STRIP-MCNC: NEGATIVE MG/DL
RBC # BLD AUTO: 4.28 M/UL (ref 3.8–5.2)
RBC #/AREA URNS HPF: ABNORMAL /HPF (ref 0–5)
SERVICE CMNT-IMP: NORMAL
SODIUM SERPL-SCNC: 140 MMOL/L (ref 136–145)
SP GR UR REFRACTOMETRY: 1.01 (ref 1–1.03)
T VAGINALIS VAG QL WET PREP: NORMAL
UROBILINOGEN UR QL STRIP.AUTO: 0.2 EU/DL (ref 0.2–1)
WBC # BLD AUTO: 8.4 K/UL (ref 3.6–11)
WBC URNS QL MICRO: ABNORMAL /HPF (ref 0–4)

## 2017-12-20 PROCEDURE — 87491 CHLMYD TRACH DNA AMP PROBE: CPT | Performed by: EMERGENCY MEDICINE

## 2017-12-20 PROCEDURE — 87210 SMEAR WET MOUNT SALINE/INK: CPT | Performed by: EMERGENCY MEDICINE

## 2017-12-20 PROCEDURE — 99285 EMERGENCY DEPT VISIT HI MDM: CPT

## 2017-12-20 PROCEDURE — 81025 URINE PREGNANCY TEST: CPT | Performed by: EMERGENCY MEDICINE

## 2017-12-20 PROCEDURE — 84132 ASSAY OF SERUM POTASSIUM: CPT | Performed by: EMERGENCY MEDICINE

## 2017-12-20 PROCEDURE — 83690 ASSAY OF LIPASE: CPT | Performed by: EMERGENCY MEDICINE

## 2017-12-20 PROCEDURE — 74011636320 HC RX REV CODE- 636/320: Performed by: EMERGENCY MEDICINE

## 2017-12-20 PROCEDURE — 36415 COLL VENOUS BLD VENIPUNCTURE: CPT | Performed by: EMERGENCY MEDICINE

## 2017-12-20 PROCEDURE — 74011250637 HC RX REV CODE- 250/637: Performed by: EMERGENCY MEDICINE

## 2017-12-20 PROCEDURE — 96375 TX/PRO/DX INJ NEW DRUG ADDON: CPT

## 2017-12-20 PROCEDURE — 96361 HYDRATE IV INFUSION ADD-ON: CPT

## 2017-12-20 PROCEDURE — 74011250636 HC RX REV CODE- 250/636: Performed by: EMERGENCY MEDICINE

## 2017-12-20 PROCEDURE — 85025 COMPLETE CBC W/AUTO DIFF WBC: CPT | Performed by: EMERGENCY MEDICINE

## 2017-12-20 PROCEDURE — 74011000250 HC RX REV CODE- 250: Performed by: EMERGENCY MEDICINE

## 2017-12-20 PROCEDURE — 74177 CT ABD & PELVIS W/CONTRAST: CPT

## 2017-12-20 PROCEDURE — 83605 ASSAY OF LACTIC ACID: CPT | Performed by: EMERGENCY MEDICINE

## 2017-12-20 PROCEDURE — 96374 THER/PROPH/DIAG INJ IV PUSH: CPT

## 2017-12-20 PROCEDURE — 80053 COMPREHEN METABOLIC PANEL: CPT | Performed by: EMERGENCY MEDICINE

## 2017-12-20 PROCEDURE — 96376 TX/PRO/DX INJ SAME DRUG ADON: CPT

## 2017-12-20 PROCEDURE — 81001 URINALYSIS AUTO W/SCOPE: CPT | Performed by: EMERGENCY MEDICINE

## 2017-12-20 PROCEDURE — 83735 ASSAY OF MAGNESIUM: CPT | Performed by: EMERGENCY MEDICINE

## 2017-12-20 RX ORDER — ONDANSETRON 2 MG/ML
4 INJECTION INTRAMUSCULAR; INTRAVENOUS
Status: COMPLETED | OUTPATIENT
Start: 2017-12-20 | End: 2017-12-20

## 2017-12-20 RX ORDER — SODIUM CHLORIDE 9 MG/ML
50 INJECTION, SOLUTION INTRAVENOUS
Status: COMPLETED | OUTPATIENT
Start: 2017-12-20 | End: 2017-12-20

## 2017-12-20 RX ORDER — FENTANYL CITRATE 50 UG/ML
100 INJECTION, SOLUTION INTRAMUSCULAR; INTRAVENOUS
Status: COMPLETED | OUTPATIENT
Start: 2017-12-20 | End: 2017-12-20

## 2017-12-20 RX ORDER — AZITHROMYCIN 250 MG/1
1000 TABLET, FILM COATED ORAL
Status: COMPLETED | OUTPATIENT
Start: 2017-12-20 | End: 2017-12-20

## 2017-12-20 RX ORDER — SODIUM CHLORIDE 0.9 % (FLUSH) 0.9 %
10 SYRINGE (ML) INJECTION
Status: COMPLETED | OUTPATIENT
Start: 2017-12-20 | End: 2017-12-20

## 2017-12-20 RX ORDER — OXYCODONE AND ACETAMINOPHEN 5; 325 MG/1; MG/1
1 TABLET ORAL
Qty: 20 TAB | Refills: 0 | Status: SHIPPED | OUTPATIENT
Start: 2017-12-20 | End: 2018-01-15 | Stop reason: ALTCHOICE

## 2017-12-20 RX ORDER — ONDANSETRON 4 MG/1
4 TABLET, ORALLY DISINTEGRATING ORAL
Qty: 10 TAB | Refills: 0 | Status: SHIPPED | OUTPATIENT
Start: 2017-12-20 | End: 2018-01-15 | Stop reason: ALTCHOICE

## 2017-12-20 RX ORDER — DICYCLOMINE HYDROCHLORIDE 20 MG/1
20 TABLET ORAL
Qty: 20 TAB | Refills: 0 | Status: SHIPPED | OUTPATIENT
Start: 2017-12-20 | End: 2018-01-15 | Stop reason: ALTCHOICE

## 2017-12-20 RX ORDER — DOXYCYCLINE HYCLATE 100 MG
100 TABLET ORAL 2 TIMES DAILY
Qty: 14 TAB | Refills: 0 | Status: SHIPPED | OUTPATIENT
Start: 2017-12-20 | End: 2018-01-03

## 2017-12-20 RX ORDER — MORPHINE SULFATE 2 MG/ML
4 INJECTION, SOLUTION INTRAMUSCULAR; INTRAVENOUS
Status: COMPLETED | OUTPATIENT
Start: 2017-12-20 | End: 2017-12-20

## 2017-12-20 RX ORDER — HYDROMORPHONE HYDROCHLORIDE 2 MG/ML
0.5 INJECTION, SOLUTION INTRAMUSCULAR; INTRAVENOUS; SUBCUTANEOUS
Status: COMPLETED | OUTPATIENT
Start: 2017-12-20 | End: 2017-12-20

## 2017-12-20 RX ORDER — KETOROLAC TROMETHAMINE 30 MG/ML
30 INJECTION, SOLUTION INTRAMUSCULAR; INTRAVENOUS
Status: COMPLETED | OUTPATIENT
Start: 2017-12-20 | End: 2017-12-20

## 2017-12-20 RX ADMIN — ONDANSETRON HYDROCHLORIDE 4 MG: 2 INJECTION, SOLUTION INTRAMUSCULAR; INTRAVENOUS at 11:54

## 2017-12-20 RX ADMIN — SODIUM CHLORIDE 1000 ML: 900 INJECTION, SOLUTION INTRAVENOUS at 11:54

## 2017-12-20 RX ADMIN — KETOROLAC TROMETHAMINE 30 MG: 30 INJECTION, SOLUTION INTRAMUSCULAR at 14:02

## 2017-12-20 RX ADMIN — DIATRIZOATE MEGLUMINE AND DIATRIZOATE SODIUM 30 ML: 600; 100 SOLUTION ORAL; RECTAL at 09:31

## 2017-12-20 RX ADMIN — SODIUM CHLORIDE 10 MG: 9 INJECTION INTRAMUSCULAR; INTRAVENOUS; SUBCUTANEOUS at 14:10

## 2017-12-20 RX ADMIN — AZITHROMYCIN 1000 MG: 250 TABLET, FILM COATED ORAL at 14:09

## 2017-12-20 RX ADMIN — IOPAMIDOL 100 ML: 755 INJECTION, SOLUTION INTRAVENOUS at 10:47

## 2017-12-20 RX ADMIN — MORPHINE SULFATE 4 MG: 2 INJECTION, SOLUTION INTRAMUSCULAR; INTRAVENOUS at 09:31

## 2017-12-20 RX ADMIN — SODIUM CHLORIDE 1000 ML: 900 INJECTION, SOLUTION INTRAVENOUS at 09:12

## 2017-12-20 RX ADMIN — ONDANSETRON HYDROCHLORIDE 4 MG: 2 INJECTION, SOLUTION INTRAMUSCULAR; INTRAVENOUS at 09:31

## 2017-12-20 RX ADMIN — FENTANYL CITRATE 100 MCG: 50 INJECTION, SOLUTION INTRAMUSCULAR; INTRAVENOUS at 14:10

## 2017-12-20 RX ADMIN — Medication 10 ML: at 10:48

## 2017-12-20 RX ADMIN — HYDROMORPHONE HYDROCHLORIDE 0.5 MG: 2 INJECTION, SOLUTION INTRAMUSCULAR; INTRAVENOUS; SUBCUTANEOUS at 11:54

## 2017-12-20 RX ADMIN — SODIUM CHLORIDE 50 ML/HR: 900 INJECTION, SOLUTION INTRAVENOUS at 10:48

## 2017-12-20 NOTE — ED PROVIDER NOTES
EMERGENCY DEPARTMENT HISTORY AND PHYSICAL EXAM      Date: 12/20/2017  Patient Name: Rosita Ramirez    History of Presenting Illness     Chief Complaint   Patient presents with    Flank Pain     Ambulatory into the ED with f/o Rt flank pain x this am. Significant discomfort noted in triage. History Provided By: Patient    HPI: Rosita Ramirez, 32 y.o. female with PMHx significant for kidney infections, presents ambulatory to the ED with cc of sudden onset of constant, dull R flank pain x 0500, rated \"10/10\" in severity. Patient reports no known modifying factors of her pain and has not taken any rx PTA. She specifically denies any F/C, N/V, discharge, CP, SOB, constipation, HA. Pt has urinated since onset. Her fhx is not significant for HTN or DM. She denies h/o abd surgeries. Pt denies chance of pregnancy. She admits to occasional etoh use. Denies tobacco use. PCP: Giulia Jorge MD    There are no other complaints, changes, or physical findings at this time. Past History     Past Medical History:  Past Medical History:   Diagnosis Date    Other ill-defined conditions(599.89)     kidney infections    Other ill-defined conditions(339.89)     Migraines       Past Surgical History:  Past Surgical History:   Procedure Laterality Date    HX ORTHOPAEDIC      pelvic bone fx       Family History:  History reviewed. No pertinent family history. Social History:  Social History   Substance Use Topics    Smoking status: Never Smoker    Smokeless tobacco: Never Used    Alcohol use No       Allergies: Allergies   Allergen Reactions    Ciprofloxacin Hives    Pcn [Penicillins] Hives         Review of Systems   Review of Systems   Constitutional: Negative for chills and fever. HENT: Negative for congestion. Eyes: Negative. Respiratory: Negative for shortness of breath. Cardiovascular: Negative for chest pain. Gastrointestinal: Positive for abdominal pain.  Negative for constipation, nausea and vomiting. Endocrine: Negative for heat intolerance. Genitourinary: Positive for flank pain. Negative for dysuria. Skin: Negative for rash. Allergic/Immunologic: Negative for immunocompromised state. Neurological: Negative for headaches. Hematological: Does not bruise/bleed easily. Psychiatric/Behavioral: Negative. All other systems reviewed and are negative. Physical Exam   Physical Exam   Constitutional: She is oriented to person, place, and time. She appears well-developed and well-nourished. She appears distressed (moderate). HENT:   Head: Normocephalic and atraumatic. Eyes: EOM are normal. Pupils are equal, round, and reactive to light. Neck: Normal range of motion. Neck supple. Cardiovascular: Normal rate, regular rhythm and normal heart sounds. Pulmonary/Chest: Effort normal and breath sounds normal. No respiratory distress. Lungs clear   Abdominal: Soft. Bowel sounds are normal. She exhibits no mass. There is tenderness. There is rebound. RLQ > RUQ tenderness   Genitourinary: Cervix exhibits discharge (mild white). Right adnexum displays tenderness. Musculoskeletal: Normal range of motion. She exhibits no edema. Cervical back: She exhibits no tenderness. Thoracic back: She exhibits no tenderness. Lumbar back: She exhibits no tenderness. Neurological: She is alert and oriented to person, place, and time. Coordination normal.   Skin: Skin is warm and dry. Psychiatric: She has a normal mood and affect. Her behavior is normal.   Nursing note and vitals reviewed.       Diagnostic Study Results     Labs -     Recent Results (from the past 12 hour(s))   CBC WITH AUTOMATED DIFF    Collection Time: 12/20/17  8:45 AM   Result Value Ref Range    WBC 8.4 3.6 - 11.0 K/uL    RBC 4.28 3.80 - 5.20 M/uL    HGB 12.7 11.5 - 16.0 g/dL    HCT 38.5 35.0 - 47.0 %    MCV 90.0 80.0 - 99.0 FL    MCH 29.7 26.0 - 34.0 PG    MCHC 33.0 30.0 - 36.5 g/dL    RDW 13.2 11.5 - 14.5 %    PLATELET 812 113 - 170 K/uL    NEUTROPHILS 73 32 - 75 %    LYMPHOCYTES 18 12 - 49 %    MONOCYTES 6 5 - 13 %    EOSINOPHILS 3 0 - 7 %    BASOPHILS 0 0 - 1 %    ABS. NEUTROPHILS 6.1 1.8 - 8.0 K/UL    ABS. LYMPHOCYTES 1.5 0.8 - 3.5 K/UL    ABS. MONOCYTES 0.5 0.0 - 1.0 K/UL    ABS. EOSINOPHILS 0.3 0.0 - 0.4 K/UL    ABS. BASOPHILS 0.0 0.0 - 0.1 K/UL   URINALYSIS W/ RFLX MICROSCOPIC    Collection Time: 12/20/17  9:03 AM   Result Value Ref Range    Color YELLOW/STRAW      Appearance CLOUDY (A) CLEAR      Specific gravity 1.014 1.003 - 1.030      pH (UA) 6.0 5.0 - 8.0      Protein NEGATIVE  NEG mg/dL    Glucose NEGATIVE  NEG mg/dL    Ketone NEGATIVE  NEG mg/dL    Bilirubin NEGATIVE  NEG      Blood NEGATIVE  NEG      Urobilinogen 0.2 0.2 - 1.0 EU/dL    Nitrites NEGATIVE  NEG      Leukocyte Esterase TRACE (A) NEG      WBC 5-10 0 - 4 /hpf    RBC 0-5 0 - 5 /hpf    Epithelial cells MODERATE (A) FEW /lpf    Bacteria 2+ (A) NEG /hpf    Hyaline cast 0-2 0 - 5 /lpf   HCG URINE, QL    Collection Time: 12/20/17  9:03 AM   Result Value Ref Range    HCG urine, Ql. NEGATIVE  NEG     METABOLIC PANEL, COMPREHENSIVE    Collection Time: 12/20/17  9:15 AM   Result Value Ref Range    Sodium 140 136 - 145 mmol/L    Potassium 3.8 3.5 - 5.1 mmol/L    Chloride 110 (H) 97 - 108 mmol/L    CO2 23 21 - 32 mmol/L    Anion gap 7 5 - 15 mmol/L    Glucose 83 65 - 100 mg/dL    BUN 10 6 - 20 MG/DL    Creatinine 0.80 0.55 - 1.02 MG/DL    BUN/Creatinine ratio 13 12 - 20      GFR est AA >60 >60 ml/min/1.73m2    GFR est non-AA >60 >60 ml/min/1.73m2    Calcium 8.2 (L) 8.5 - 10.1 MG/DL    Bilirubin, total 0.2 0.2 - 1.0 MG/DL    ALT (SGPT) 19 12 - 78 U/L    AST (SGOT) 16 15 - 37 U/L    Alk.  phosphatase 42 (L) 45 - 117 U/L    Protein, total 7.0 6.4 - 8.2 g/dL    Albumin 3.3 (L) 3.5 - 5.0 g/dL    Globulin 3.7 2.0 - 4.0 g/dL    A-G Ratio 0.9 (L) 1.1 - 2.2     LACTIC ACID    Collection Time: 12/20/17  9:15 AM   Result Value Ref Range    Lactic acid 1.6 0.4 - 2.0 MMOL/L   LIPASE    Collection Time: 12/20/17  9:15 AM   Result Value Ref Range    Lipase 133 73 - 393 U/L   POTASSIUM    Collection Time: 12/20/17 11:15 AM   Result Value Ref Range    Potassium 3.8 3.5 - 5.1 mmol/L   MAGNESIUM    Collection Time: 12/20/17 11:15 AM   Result Value Ref Range    Magnesium 2.1 1.6 - 2.4 mg/dL   WET PREP    Collection Time: 12/20/17  1:07 PM   Result Value Ref Range    Clue cells CLUE CELLS ABSENT      Wet prep NO TRICHOMONAS SEEN     KOH, OTHER SOURCES    Collection Time: 12/20/17  1:07 PM   Result Value Ref Range    Special Requests: NO SPECIAL REQUESTS      KOH NO YEAST SEEN         Radiologic Studies -   CT ABD PELV W CONT   Final Result        CT Results  (Last 48 hours)               12/20/17 1047  CT ABD PELV W CONT Final result    Impression:  IMPRESSION:   No acute abnormality. Narrative:  EXAM:  CT ABD PELV W CONT       INDICATION: RLQ/RUQ pain today       COMPARISON: 1/28/2015       CONTRAST:  100 mL of Isovue-370. TECHNIQUE:    Following the uneventful intravenous administration of contrast, thin axial   images were obtained through the abdomen and pelvis. Coronal and sagittal   reconstructions were generated. Oral contrast was administered. CT dose   reduction was achieved through use of a standardized protocol tailored for this   examination and automatic exposure control for dose modulation. FINDINGS:    LUNG BASES: Clear. INCIDENTALLY IMAGED HEART AND MEDIASTINUM: Unremarkable. LIVER: No mass or biliary dilatation. GALLBLADDER: Unremarkable. SPLEEN: No mass. PANCREAS: No mass or ductal dilatation. ADRENALS: Unremarkable. KIDNEYS: No mass, calculus, or hydronephrosis. STOMACH: Unremarkable. SMALL BOWEL: No dilatation or wall thickening. COLON: No dilatation or wall thickening. Small pericecal lymph nodes are   unchanged. APPENDIX: Unremarkable. PERITONEUM: No ascites or pneumoperitoneum.    RETROPERITONEUM: No lymphadenopathy or aortic aneurysm. REPRODUCTIVE ORGANS: There is no pelvic mass or lymphadenopathy. URINARY BLADDER: No mass or calculus. BONES: No destructive bone lesion. ADDITIONAL COMMENTS: N/A               CXR Results  (Last 48 hours)    None            Medical Decision Making   I am the first provider for this patient. I reviewed the vital signs, available nursing notes, past medical history, past surgical history, family history and social history. Vital Signs-Reviewed the patient's vital signs. Patient Vitals for the past 12 hrs:   Temp Pulse Resp BP SpO2   12/20/17 1300 - - - 100/71 98 %   12/20/17 1200 - - - 113/74 98 %   12/20/17 1156 - 81 - 125/78 -   12/20/17 1100 - - - 112/73 100 %   12/20/17 1000 - - - (!) 118/93 100 %   12/20/17 0933 - 74 - 126/86 100 %   12/20/17 0831 98.2 °F (36.8 °C) 78 16 133/74 100 %       Pulse Oximetry Analysis - 100% on RA    Cardiac Monitor:   Rate: 75 bpm  Rhythm: Normal Sinus Rhythm        Records Reviewed: Nursing Notes and Old Medical Records    Provider Notes (Medical Decision Making): ddx includes appendicitis, kidney stone, uti, biliary colic, pancreatitis, ovarian cyst    ED Course:   Initial assessment performed. The patients presenting problems have been discussed, and they are in agreement with the care plan formulated and outlined with them. I have encouraged them to ask questions as they arise throughout their visit. 11:44 AM  Pt reports no improvement of her pain with dose of Morphine  Written by JOSE Luevano, as dictated by Ely Mcneil MD.       12:18 PM  On reevaluation, pt reports her pain has increased to 8/10 in severity. Pelvic exam set up ordered. Written by JOSE Luevano, as dictated by Ely Mcneil MD.    Procedure Note - Pelvic Exam:    1:01 PM   Performed by: Ely Mcneil MD  Chaperoned by: 1 [employer]  Pelvic exam was performed using bimanual and speculum.  Further findings noted in physical exam. The procedure took 1-15 minutes, and pt tolerated well.        1:07 PM  Pt reports little improvement of her pain with Toradol. Written by Sterling Mcdaniels, ED Scribe, as dictated by Kumar Rojas MD .       Critical Care Time: none    Disposition:      Discharge Note:  2:02 PM  The pt is ready for discharge. The pt's signs, symptoms, diagnosis, and discharge instructions have been discussed and pt has conveyed their understanding. The pt is to follow up as recommended or return to ER should their symptoms worsen. Plan has been discussed and pt is in agreement. PLAN:  1. Current Discharge Medication List      START taking these medications    Details   oxyCODONE-acetaminophen (PERCOCET) 5-325 mg per tablet Take 1 Tab by mouth every four (4) hours as needed for Pain. Max Daily Amount: 6 Tabs. Qty: 20 Tab, Refills: 0    Associated Diagnoses: Abdominal pain, right lower quadrant      dicyclomine (BENTYL) 20 mg tablet Take 1 Tab by mouth every six (6) hours as needed (abdominal cramps) for up to 20 doses. Qty: 20 Tab, Refills: 0      ondansetron (ZOFRAN ODT) 4 mg disintegrating tablet Take 1 Tab by mouth every eight (8) hours as needed for Nausea. Qty: 10 Tab, Refills: 0      doxycycline (VIBRA-TABS) 100 mg tablet Take 1 Tab by mouth two (2) times a day for 14 days. Qty: 14 Tab, Refills: 0           2. Follow-up Information     Follow up With Details Comments Contact Info    Doreen Elizondo MD Call in 2 days As needed [address]  [phone]      [facility] EMERGENCY DEPT  If symptoms worsen [address]  [address] [facility]  [phone]        Return to ED if worse     Diagnosis     Clinical Impression:   1. Right flank pain    2. Abdominal pain, right lower quadrant    3. PID (pelvic inflammatory disease)        Attestations:     This note is prepared by Sterling Mcdaniels, acting as Scribe for Kumar Rojas MD.       The scribe's documentation has been prepared under my direction and personally reviewed by me in its entirety. I confirm that the note above accurately reflects all work, treatment, procedures, and medical decision making performed by me.

## 2017-12-20 NOTE — DISCHARGE INSTRUCTIONS
Pelvic Inflammatory Disease: Care Instructions  Your Care Instructions    Pelvic inflammatory disease, or PID, is an infection of a woman's fallopian tubes and other reproductive organs. PID is usually caused by a sexually transmitted infection (STI), such as gonorrhea or chlamydia. PID can cause scars in the fallopian tubes, making it hard for a woman to get pregnant. Having one STI increases your risk for other STIs, such as genital herpes, genital warts, syphilis, and HIV. It is important to take all the medicine that was prescribed. PID can cause serious health problems if you do not complete your treatment. Follow-up care is a key part of your treatment and safety. Be sure to make and go to all appointments, and call your doctor if you are having problems. It's also a good idea to know your test results and keep a list of the medicines you take. How can you care for yourself at home? · Take your antibiotics as directed. Do not stop taking them just because you feel better. You need to take the full course of antibiotics. · Rest until your fever and pain have improved. · Take pain medicines exactly as directed. ¨ If the doctor gave you a prescription medicine for pain, take it as prescribed. ¨ If you are not taking a prescription pain medicine, ask your doctor if you can take an over-the-counter medicine. · Use a hot water bottle or a heating pad (set on low) on your belly for pain. · Do not douche. · Do not have sex or use tampons (you can use pads instead) until you have taken all the medicine, your pain is gone, and you feel completely well. · Talk to any sex partners you have had in the past 2 months. They need to be tested and may need to be treated for STIs. To prevent STIs  · Use latex condoms every time you have sex. Use them from the beginning to the end of sexual contact. · Talk to your partner before you have sex.  Find out if he or she has or is at risk for any sexually transmitted infection (STI). Keep in mind that a person may be able to spread an STI even if he or she does not have symptoms. · Do not have sex with anyone who has symptoms of an STI, such as sores on the genitals or mouth. · Having one sex partner (who does not have STIs and does not have sex with anyone else) is a good way to avoid STIs. When should you call for help? Call your doctor now or seek immediate medical care if:  ? · You have a new or higher fever. ? · You have unusual vaginal bleeding. ? · You have new or worse belly or pelvic pain. ? · You have vaginal discharge that has increased in amount or smells bad. ? Watch closely for changes in your health, and be sure to contact your doctor if:  ? · You do not get better as expected. Where can you learn more? Go to http://vilmaZuznowfrancois.info/. Enter N294 in the search box to learn more about \"Pelvic Inflammatory Disease: Care Instructions. \"  Current as of: October 13, 2016  Content Version: 11.4  © 5208-1759 [employer]. Care instructions adapted under license by [employer] (which disclaims liability or warranty for this information). If you have questions about a medical condition or this instruction, always ask your healthcare professional. [address] any warranty or liability for your use of this information. Abdominal Pain: Care Instructions  Your Care Instructions    Abdominal pain has many possible causes. Some aren't serious and get better on their own in a few days. Others need more testing and treatment. If your pain continues or gets worse, you need to be rechecked and may need more tests to find out what is wrong. You may need surgery to correct the problem. Don't ignore new symptoms, such as fever, nausea and vomiting, urination problems, pain that gets worse, and dizziness. These may be signs of a more serious problem.   Your doctor may have recommended a follow-up visit in the next 8 to 12 hours. If you are not getting better, you may need more tests or treatment. The doctor has checked you carefully, but problems can develop later. If you notice any problems or new symptoms, get medical treatment right away. Follow-up care is a key part of your treatment and safety. Be sure to make and go to all appointments, and call your doctor if you are having problems. It's also a good idea to know your test results and keep a list of the medicines you take. How can you care for yourself at home? · Rest until you feel better. · To prevent dehydration, drink plenty of fluids, enough so that your urine is light yellow or clear like water. Choose water and other caffeine-free clear liquids until you feel better. If you have kidney, heart, or liver disease and have to limit fluids, talk with your doctor before you increase the amount of fluids you drink. · If your stomach is upset, eat mild foods, such as rice, dry toast or crackers, bananas, and applesauce. Try eating several small meals instead of two or three large ones. · Wait until 48 hours after all symptoms have gone away before you have spicy foods, alcohol, and drinks that contain caffeine. · Do not eat foods that are high in fat. · Avoid anti-inflammatory medicines such as aspirin, ibuprofen (Advil, Motrin), and naproxen (Aleve). These can cause stomach upset. Talk to your doctor if you take daily aspirin for another health problem. When should you call for help? Call 911 anytime you think you may need emergency care. For example, call if:  ? · You passed out (lost consciousness). ? · You pass maroon or very bloody stools. ? · You vomit blood or what looks like coffee grounds. ? · You have new, severe belly pain. ?Call your doctor now or seek immediate medical care if:  ? · Your pain gets worse, especially if it becomes focused in one area of your belly. ? · You have a new or higher fever.    ? · Your stools are black and look like tar, or they have streaks of blood. ? · You have unexpected vaginal bleeding. ? · You have symptoms of a urinary tract infection. These may include:  ¨ Pain when you urinate. ¨ Urinating more often than usual.  ¨ Blood in your urine. ? · You are dizzy or lightheaded, or you feel like you may faint. ? Watch closely for changes in your health, and be sure to contact your doctor if:  ? · You are not getting better after 1 day (24 hours). Where can you learn more? Go to http://vilma-francois.info/. Enter S283 in the search box to learn more about \"Abdominal Pain: Care Instructions. \"  Current as of: March 20, 2017  Content Version: 11.4  © 5381-5922 [employer]. Care instructions adapted under license by [employer] (which disclaims liability or warranty for this information). If you have questions about a medical condition or this instruction, always ask your healthcare professional. [place] [address] any warranty or liability for your use of this information.

## 2017-12-20 NOTE — LETTER
[address] 
[facility] EMERGENCY DEPT 
[address] [place] [address] [address] [id number] [phone] Work/School Note Date: 12/20/2017 To Whom It May concern: 
 
Efra Gutierres was seen and treated today in the emergency room by the following provider(s): 
Attending Provider: Matt Chavez MD. Efra Gutierres may return to work on 12/21/2017. Sincerely, Matt Chavez MD

## 2017-12-20 NOTE — ED NOTES
Discharge instructions reviewed with the patient by the MD. Patient ambulatory out of the ER and discharged home with father in law.

## 2017-12-26 VITALS
HEART RATE: 84 BPM | WEIGHT: 189.82 LBS | BODY MASS INDEX: 30.64 KG/M2 | TEMPERATURE: 97 F | RESPIRATION RATE: 16 BRPM | SYSTOLIC BLOOD PRESSURE: 129 MMHG | OXYGEN SATURATION: 100 % | DIASTOLIC BLOOD PRESSURE: 86 MMHG

## 2017-12-26 PROCEDURE — 74011250637 HC RX REV CODE- 250/637: Performed by: EMERGENCY MEDICINE

## 2017-12-26 PROCEDURE — 99283 EMERGENCY DEPT VISIT LOW MDM: CPT

## 2017-12-26 RX ORDER — ACETAMINOPHEN 325 MG/1
650 TABLET ORAL
Status: COMPLETED | OUTPATIENT
Start: 2017-12-26 | End: 2017-12-26

## 2017-12-26 RX ADMIN — ACETAMINOPHEN 650 MG: 325 TABLET ORAL at 21:58

## 2017-12-27 ENCOUNTER — HOSPITAL ENCOUNTER (EMERGENCY)
Age: 27
Discharge: HOME | End: 2017-12-27
Attending: EMERGENCY MEDICINE
Payer: COMMERCIAL

## 2017-12-27 ENCOUNTER — APPOINTMENT (OUTPATIENT)
Dept: GENERAL RADIOLOGY | Age: 27
End: 2017-12-27
Attending: EMERGENCY MEDICINE
Payer: COMMERCIAL

## 2017-12-27 DIAGNOSIS — S60.222A CONTUSION OF LEFT HAND, INITIAL ENCOUNTER: ICD-10-CM

## 2017-12-27 DIAGNOSIS — S69.92XA HAND INJURY, LEFT, INITIAL ENCOUNTER: Primary | ICD-10-CM

## 2017-12-27 PROCEDURE — 73130 X-RAY EXAM OF HAND: CPT

## 2017-12-27 RX ORDER — LIDOCAINE 50 MG/G
1 PATCH TOPICAL
Status: DISCONTINUED | OUTPATIENT
Start: 2017-12-27 | End: 2017-12-27 | Stop reason: HOSPADM

## 2017-12-27 RX ORDER — NAPROXEN 500 MG/1
500 TABLET ORAL
Qty: 20 TAB | Refills: 0 | Status: SHIPPED | OUTPATIENT
Start: 2017-12-27 | End: 2018-01-15 | Stop reason: ALTCHOICE

## 2017-12-27 NOTE — DISCHARGE INSTRUCTIONS
Hand Bruises: Care Instructions  Your Care Instructions  Bruises, or contusions, can happen as a result of an impact or fall. Most people think of a bruise as a black-and-blue spot. This happens when small blood vessels get torn and leak blood under the skin. The bruise may turn purplish black, reddish blue, or yellowish green as it heals. But bones and muscles can also get bruised. This may damage the hand but not cause a bruise that you can see. Most bruises aren't serious and will go away on their own in 2 to 4 weeks. But sometimes a more serious hand injury might not heal on its own. Tell your doctor if you have new symptoms or your injury is not getting better over time. You may have tests to see if you have bone or nerve damage. These tests may include X-rays, a CT scan, or an MRI. If you damaged bones or muscles, you may need more treatment. The doctor has checked you carefully, but problems can develop later. If you notice any problems or new symptoms, get medical treatment right away. Follow-up care is a key part of your treatment and safety. Be sure to make and go to all appointments, and call your doctor if you are having problems. It's also a good idea to know your test results and keep a list of the medicines you take. How can you care for yourself at home? · Put ice or a cold pack on the hand for 10 to 20 minutes at a time. Put a thin cloth between the ice and your skin. · Prop up your hand on a pillow when you ice it or anytime you sit or lie down during the next 3 days. Try to keep your hand above the level of your heart. This will help reduce swelling. · Be safe with medicines. Read and follow all instructions on the label. ¨ If the doctor gave you a prescription medicine for pain, take it as prescribed. ¨ If you are not taking a prescription pain medicine, ask your doctor if you can take an over-the-counter medicine.   · Be sure to follow your doctor's advice about moving and exercising your injured hand. When should you call for help? Call your doctor now or seek immediate medical care if:  ? · Your pain gets worse. ? · You have new or worse swelling. ? · You have tingling, weakness, or numbness in the area near the bruise. ? · The area near the bruise is cold or pale. ? · You have symptoms of infection, such as:  ¨ Increased pain, swelling, warmth, or redness. ¨ Red streaks leading from the area. ¨ Pus draining from the area. ¨ A fever. ? Watch closely for changes in your health, and be sure to contact your doctor if:  ? · You do not get better as expected. Where can you learn more? Go to http://vilma-francois.info/. Enter L948 in the search box to learn more about \"Hand Bruises: Care Instructions. \"  Current as of: March 20, 2017  Content Version: 11.4  © 0725-4130 [employer]. Care instructions adapted under license by [employer] (which disclaims liability or warranty for this information). If you have questions about a medical condition or this instruction, always ask your healthcare professional. [place] [address] any warranty or liability for your use of this information.

## 2017-12-27 NOTE — LETTER
[address] 
[facility] EMERGENCY DEPT 
[address] [place] [address] [address] [id number] 
[phone] Work/School Note Date: 12/26/2017 To Whom It May concern: 
 
Leticia Dias was seen and treated today in the emergency room by the following provider(s): 
Attending Provider: Karen See MD. Leticia Dias may return to work on 12/28/17.  
 
Sincerely, 
 
 
 
 
Karen See MD

## 2017-12-27 NOTE — ED PROVIDER NOTES
EMERGENCY DEPARTMENT HISTORY AND PHYSICAL EXAM      Date: 12/27/2017  Patient Name: Madisyn Frank    History of Presenting Illness     Chief Complaint   Patient presents with    Hand Pain     L hand pain after dropping dishes on her hand at work. Swelling and bruising to L hand. History Provided By: Patient    HPI: Madisyn Frank is a 32 y.o. female, who presents ambulatory to the ED c/o constant, left hand pain x2000 yesterday. Pt states she was at work, when bread pans fell from above onto the top of her hand. She notes associated mild wrist pain. She denies taking any medications for relief of symptoms or any other relieving or exacerbating factors. Pt specifically denies any fever, congestion, cough, shortness of breath, chest pain, abdominal pain, nausea, vomiting, diarrhea, dysuria, or urinary frequency. PCP: Giulia Jorge MD    PMHx: Significant for migraines   PSHx: Significant for orthopedic surgery   Social Hx: -tobacco, -EtOH, -Illicit Drugs     There are no other complaints, changes, or physical findings at this time. Current Facility-Administered Medications   Medication Dose Route Frequency Provider Last Rate Last Dose    lidocaine (LIDODERM) 5 % patch 1 Patch  1 Patch TransDERmal NOW Samir Barillas MD         Current Outpatient Prescriptions   Medication Sig Dispense Refill    naproxen (NAPROSYN) 500 mg tablet Take 1 Tab by mouth every twelve (12) hours as needed for Pain. 20 Tab 0    oxyCODONE-acetaminophen (PERCOCET) 5-325 mg per tablet Take 1 Tab by mouth every four (4) hours as needed for Pain. Max Daily Amount: 6 Tabs. 20 Tab 0    dicyclomine (BENTYL) 20 mg tablet Take 1 Tab by mouth every six (6) hours as needed (abdominal cramps) for up to 20 doses. 20 Tab 0    ondansetron (ZOFRAN ODT) 4 mg disintegrating tablet Take 1 Tab by mouth every eight (8) hours as needed for Nausea.  10 Tab 0    doxycycline (VIBRA-TABS) 100 mg tablet Take 1 Tab by mouth two (2) times a day for 14 days. 14 Tab 0       Past History     Past Medical History:  Past Medical History:   Diagnosis Date    Other ill-defined conditions(799.89)     kidney infections    Other ill-defined conditions(799.89)     Migraines       Past Surgical History:  Past Surgical History:   Procedure Laterality Date    HX ORTHOPAEDIC      pelvic bone fx       Family History:  No family history on file. Social History:  Social History   Substance Use Topics    Smoking status: Never Smoker    Smokeless tobacco: Never Used    Alcohol use No       Allergies: Allergies   Allergen Reactions    Ciprofloxacin Hives    Pcn [Penicillins] Hives         Review of Systems   Review of Systems   Constitutional: Negative. Negative for fever. Eyes: Negative. Respiratory: Negative. Negative for shortness of breath. Cardiovascular: Negative for chest pain. Gastrointestinal: Negative for abdominal pain, nausea and vomiting. Endocrine: Negative. Genitourinary: Negative. Negative for difficulty urinating, dysuria and hematuria. Musculoskeletal: Positive for myalgias (L hand). Skin: Negative. Allergic/Immunologic: Negative. Neurological: Negative. Psychiatric/Behavioral: Negative for suicidal ideas. All other systems reviewed and are negative. Physical Exam   Physical Exam   Constitutional: She is oriented to person, place, and time. She appears well-developed and well-nourished. No distress. HENT:   Head: Normocephalic and atraumatic. Nose: Nose normal.   Eyes: Conjunctivae and EOM are normal. No scleral icterus. Neck: Normal range of motion. No tracheal deviation present. Cardiovascular: Normal rate, regular rhythm, normal heart sounds and intact distal pulses. Exam reveals no friction rub. No murmur heard. Pulmonary/Chest: Effort normal and breath sounds normal. No stridor. No respiratory distress. She has no wheezes. She has no rales. Abdominal: Soft.  Bowel sounds are normal. She exhibits no distension. There is no tenderness. There is no rebound. Musculoskeletal: Normal range of motion. She exhibits tenderness. Left hand: She exhibits tenderness, bony tenderness and swelling (mild over dorsum of hand). She exhibits no deformity. Neurological: She is alert and oriented to person, place, and time. No cranial nerve deficit. Skin: Skin is warm and dry. No rash noted. She is not diaphoretic. Psychiatric: She has a normal mood and affect. Her speech is normal and behavior is normal. Judgment and thought content normal. Cognition and memory are normal.   Nursing note and vitals reviewed. Diagnostic Study Results     Labs -   No results found for this or any previous visit (from the past 12 hour(s)). Radiologic Studies -   XR HAND LT MIN 3 V   Final Result   EXAM:  XR HAND LT MIN 3 V  Clinical history: Trauma, dropped 5 pound and on hand, reduced range of motion,  diminished  strength  INDICATION:  Trauma.     COMPARISON: None.     FINDINGS: Three views of the left hand demonstrate no fracture, dislocation or  other acute osseous or articular abnormality. The soft tissues are within  normal limits.     IMPRESSION  IMPRESSION:  No acute abnormality. Medical Decision Making   I am the first provider for this patient. I reviewed the vital signs, available nursing notes, past medical history, past surgical history, family history and social history. Vital Signs-Reviewed the patient's vital signs. Patient Vitals for the past 12 hrs:   Temp Pulse Resp BP SpO2   12/26/17 2153 97 °F (36.1 °C) 84 16 129/86 100 %       Pulse Oximetry Analysis - 100% on RA    Cardiac Monitor:   Rate: 84 bpm  Rhythm: Normal Sinus Rhythm      Records Reviewed: Nursing Notes and Old Medical Records    Provider Notes (Medical Decision Making):     DDX:  Fracture, contusion    Plan:  Xray, analgesic    Impression:  contustion    ED Course:   Initial assessment performed.  The patients presenting problems have been discussed, and they are in agreement with the care plan formulated and outlined with them. I have encouraged them to ask questions as they arise throughout their visit. I reviewed our electronic medical record system for any past medical records that were available that may contribute to the patients current condition, the nursing notes and and vital signs from today's visit    Nursing notes will be reviewed as they become available in realtime while the pt has been in the ED. Liam Ponce MD    I personally reviewed pt's imaging. Official read by radiology listed below. Liam Ponce MD    2:06 AM  Progress note:  Pt noted to be feeling better, ready for discharge. Discussed imaging findings with pt, specifically noting negative x-ray results. Pt will follow up as instructed. All questions have been answered, pt voiced understanding and agreement with plan. If narcotics were prescribed, pt was advised not to drive or operate heavy machinery. If abx were prescribed, pt advised that diarrhea and rash are possible side effects of the medications. Specific return precautions provided in addition to instructions for pt to return to the ED immediately should sx worsen at any time. Liam Ponce MD      PLAN:  1. Current Discharge Medication List      START taking these medications    Details   naproxen (NAPROSYN) 500 mg tablet Take 1 Tab by mouth every twelve (12) hours as needed for Pain. Qty: 20 Tab, Refills: 0           2.    Follow-up Information     Follow up With Details Comments Contact Info    [facility] EMERGENCY DEPT Schedule an appointment as soon as possible for a visit in 2 days  [address]  [facility] [address]   [phone] [facility] [address] Hermelindo Arenas MD Schedule an appointment as soon as possible for a visit in 2 days  [address] [facility]  [phone]          Return to ED if worse     Disposition:    2:06 AM   The patient's results have been reviewed with family and/or caregiver. They verbally convey their understanding and agreement of the patient's signs, symptoms, diagnosis, treatment and prognosis and additionally agree to follow up as recommended in the discharge instructions or to return to the Emergency Room should the patient's condition change prior to their follow-up appointment. The family and/or caregiver verbally agrees with the care-plan and all of their questions have been answered. The discharge instructions have also been provided to the them with educational information regarding the patient's diagnosis as well a list of reasons why the patient would want to return to the ER prior to their follow-up appointment should their condition change. Chidi Hastings MD        Diagnosis     Clinical Impression:   1. Hand injury, left, initial encounter    2. Contusion of left hand, initial encounter        Attestations: This note is prepared by Gustavo Roladn, acting as Scribe for MD Chidi Reis MD : The scribe's documentation has been prepared under my direction and personally reviewed by me in its entirety. I confirm that the note above accurately reflects all work, treatment, procedures, and medical decision making performed by me. This note will not be viewable in [address]

## 2017-12-27 NOTE — ED NOTES
C/o pain in left hand, dropped 5lbs of pan on hand, reduced ROM and reduced  strength, reduced dorsi extension.  + bruising and swelling of hand

## 2018-01-15 ENCOUNTER — OFFICE VISIT (OUTPATIENT)
Dept: FAMILY MEDICINE CLINIC | Age: 28
End: 2018-01-15

## 2018-01-15 VITALS
RESPIRATION RATE: 18 BRPM | WEIGHT: 190.9 LBS | DIASTOLIC BLOOD PRESSURE: 77 MMHG | HEIGHT: 65 IN | OXYGEN SATURATION: 98 % | TEMPERATURE: 95.9 F | SYSTOLIC BLOOD PRESSURE: 120 MMHG | BODY MASS INDEX: 31.81 KG/M2 | HEART RATE: 84 BPM

## 2018-01-15 DIAGNOSIS — J30.2 ACUTE SEASONAL ALLERGIC RHINITIS, UNSPECIFIED TRIGGER: ICD-10-CM

## 2018-01-15 DIAGNOSIS — G43.009 MIGRAINE WITHOUT AURA AND WITHOUT STATUS MIGRAINOSUS, NOT INTRACTABLE: Primary | ICD-10-CM

## 2018-01-15 DIAGNOSIS — Q38.5 LONG UVULA: ICD-10-CM

## 2018-01-15 DIAGNOSIS — G47.9 SLEEP DISTURBANCE: ICD-10-CM

## 2018-01-15 RX ORDER — KETOROLAC TROMETHAMINE 30 MG/ML
30 INJECTION, SOLUTION INTRAMUSCULAR; INTRAVENOUS ONCE
Qty: 1 VIAL | Refills: 0
Start: 2018-01-15 | End: 2018-01-15

## 2018-01-15 RX ORDER — SUMATRIPTAN 100 MG/1
100 TABLET, FILM COATED ORAL
Qty: 12 TAB | Refills: 0 | Status: SHIPPED | OUTPATIENT
Start: 2018-01-15 | End: 2018-05-22 | Stop reason: ALTCHOICE

## 2018-01-15 RX ORDER — FLUTICASONE PROPIONATE 50 MCG
2 SPRAY, SUSPENSION (ML) NASAL DAILY
Qty: 1 BOTTLE | Refills: 2 | Status: SHIPPED | OUTPATIENT
Start: 2018-01-15 | End: 2018-05-22 | Stop reason: ALTCHOICE

## 2018-01-15 NOTE — MR AVS SNAPSHOT
[facility] 
 
 
 [address] 
[address] [facility] [address] 
[phone] Patient: Jo Ennis MRN:  [id number] Visit Information Date & Time Provider Department Dept. Phone Encounter #  
 1/15/2018 11:40 AM Dyan Richey NP [facility] [id number] Follow-up Instructions Return in about 2 weeks (around 1/29/2018) for Migraines. Upcoming Health Maintenance Date Due  
 PAP AKA CERVICAL CYTOLOGY 10/17/2020 DTaP/Tdap/Td series (2 - Td) 1/15/2028 Allergies as of 1/15/2018  Review Complete On: 1/15/2018 By: Dyan Richey NP Severity Noted Reaction Type Reactions Ciprofloxacin  05/05/2010    Hives Pcn [Penicillins]  05/05/2010    Hives Current Immunizations  Never Reviewed No immunizations on file. Not reviewed this visit You Were Diagnosed With   
  
 Codes Comments Migraine without aura and without status migrainosus, not intractable    -  Primary ICD-10-CM: G43.009 ICD-9-CM: 346.10 Acute seasonal allergic rhinitis, unspecified trigger     ICD-10-CM: J30.2 ICD-9-CM: 477.8 Sleep disturbance     ICD-10-CM: G47.9 ICD-9-CM: 780.50 Long uvula     ICD-10-CM: Q38.5 ICD-9-CM: 750.9 Vitals BP Pulse Temp Resp Height(growth percentile) Weight(growth percentile) 120/77 (BP 1 Location: Right arm, BP Patient Position: Sitting) 84 95.9 °F (35.5 °C) (Oral) 18 5' 5.35\" (1.66 m) 190 lb 14.4 oz (86.6 kg) SpO2 BMI OB Status Smoking Status 98% 31.42 kg/m2 Injection Never Smoker Vitals History BMI and BSA Data Body Mass Index Body Surface Area  
 31.42 kg/m 2 2 m 2 Preferred Pharmacy Pharmacy Name Phone [facility] [address] [address] Lyudmila Charles [phone] Your Updated Medication List  
  
   
This list is accurate as of: 1/15/18 12:49 PM.  Always use your most recent med list.  
  
  
  
 fluticasone 50 mcg/actuation nasal spray Commonly known as:  [place]anna [place] 2 Sprays by Both Nostrils route daily. Indications: Allergic Rhinitis  
  
 ketorolac 30 mg/mL (1 mL) injection Commonly known as:  TORADOL  
1 mL by IntraVENous route once for 1 dose. SUMAtriptan 100 mg tablet Commonly known as:  IMITREX Take 1 Tab by mouth once as needed for Migraine. May repeat x 1 in 2 hours prn, maximum 2 per day Prescriptions Sent to Pharmacy Refills SUMAtriptan (IMITREX) 100 mg tablet 0 Sig: Take 1 Tab by mouth once as needed for Migraine. May repeat x 1 in 2 hours prn, maximum 2 per day Class: Normal  
 Pharmacy: [facility] DR MUNA DICKENS [address] [place] [address] Ph #: [phone] Route: Oral  
 fluticasone (FLONASE) 50 mcg/actuation nasal spray 2 Si Sprays by Both Nostrils route daily. Indications: Allergic Rhinitis Class: Normal  
 Pharmacy: [facility] DR MUNA DICKENS [address] [place] [address] Ph #: [phone] Route: Both Nostrils We Performed the Following [place] THER/PROPH/DIAG INJECTION, SUBCUT/IM [id number] CPT(R)] Follow-up Instructions Return in about 2 weeks (around 2018) for Migraines. To-Do List   
 02/15/2018 Outpatient Referral:  SLEEP MEDICINE REFERRAL Referral Information Referral ID Referred By Referred To  
  
 [id number] Isidra Watts MD   
   [address] [place] [address] [address] Phone: [phone] Fax: [phone] Visits Status Start Date End Date 1 New Request 1/15/18 1/15/19 If your referral has a status of pending review or denied, additional information will be sent to support the outcome of this decision. Patient Instructions Migraine Headache: Care Instructions Your Care Instructions Migraines are painful, throbbing headaches that often start on one side of the head. They may cause nausea and vomiting and make you sensitive to light, sound, or smell. Without treatment, migraines can last from 4 hours to a few days. Medicines can help prevent migraines or stop them after they have started. Your doctor can help you find which ones work best for you. Follow-up care is a key part of your treatment and safety. Be sure to make and go to all appointments, and call your doctor if you are having problems. It's also a good idea to know your test results and keep a list of the medicines you take. How can you care for yourself at home? · Do not drive if you have taken a prescription pain medicine. · Rest in a quiet, dark room until your headache is gone. Close your eyes, and try to relax or go to sleep. Don't watch TV or read. · Put a cold, moist cloth or cold pack on the painful area for 10 to 20 minutes at a time. Put a thin cloth between the cold pack and your skin. · Use a warm, moist towel or a heating pad set on low to relax tight shoulder and neck muscles. · Have someone gently massage your neck and shoulders. · Take your medicines exactly as prescribed. Call your doctor if you think you are having a problem with your medicine. You will get more details on the specific medicines your doctor prescribes. · Be careful not to take pain medicine more often than the instructions allow. You could get worse or more frequent headaches when the medicine wears off. To prevent migraines · Keep a headache diary so you can figure out what triggers your headaches. Avoiding triggers may help you prevent headaches. Record when each headache began, how long it lasted, and what the pain was like. (Was it throbbing, aching, stabbing, or dull?) Write down any other symptoms you had with the headache, such as nausea, flashing lights or dark spots, or sensitivity to bright light or loud noise. Note if the headache occurred near your period.  List anything that might have triggered the headache. Triggers may include certain foods (chocolate, cheese, wine) or odors, smoke, bright light, stress, or lack of sleep. · If your doctor has prescribed medicine for your migraines, take it as directed. You may have medicine that you take only when you get a migraine and medicine that you take all the time to help prevent migraines. ¨ If your doctor has prescribed medicine for when you get a headache, take it at the first sign of a migraine, unless your doctor has given you other instructions. ¨ If your doctor has prescribed medicine to prevent migraines, take it exactly as prescribed. Call your doctor if you think you are having a problem with your medicine. · Find healthy ways to deal with stress. Migraines are most common during or right after stressful times. Take time to relax before and after you do something that has caused a migraine in the past. 
· Try to keep your muscles relaxed by keeping good posture. Check your jaw, face, neck, and shoulder muscles for tension. Try to relax them. When you sit at a desk, change positions often. And make sure to stretch for 30 seconds each hour. · Get plenty of sleep and exercise. · Eat meals on a regular schedule. Avoid foods and drinks that often trigger migraines. These include chocolate, alcohol (especially red wine and port), aspartame, monosodium glutamate (MSG), and some additives found in foods (such as hot dogs, mora, cold cuts, aged cheeses, and pickled foods). · Limit caffeine. Don't drink too much coffee, tea, or soda. But don't quit caffeine suddenly. That can also give you migraines. · Do not smoke or allow others to smoke around you. If you need help quitting, talk to your doctor about stop-smoking programs and medicines. These can increase your chances of quitting for good. · If you are taking birth control pills or hormone therapy, talk to your doctor about whether they are triggering your migraines. When should you call for help? Call 911 anytime you think you may need emergency care. For example, call if: 
? · You have signs of a stroke. These may include: 
¨ Sudden numbness, paralysis, or weakness in your face, arm, or leg, especially on only one side of your body. ¨ Sudden vision changes. ¨ Sudden trouble speaking. ¨ Sudden confusion or trouble understanding simple statements. ¨ Sudden problems with walking or balance. ¨ A sudden, severe headache that is different from past headaches. ?Call your doctor now or seek immediate medical care if: 
? · You have new or worse nausea and vomiting. ? · You have a new or higher fever. ? · Your headache gets much worse. ? Watch closely for changes in your health, and be sure to contact your doctor if: 
? · You are not getting better after 2 days (48 hours). Where can you learn more? Go to http://vilma-francois.info/. Enter G963 in the search box to learn more about \"Migraine Headache: Care Instructions. \" Current as of: October 14, 2016 Content Version: 11.4 © 8484-3003 [employer]. Care instructions adapted under license by [employer] (which disclaims liability or warranty for this information). If you have questions about a medical condition or this instruction, always ask your healthcare professional. [address] any warranty or liability for your use of this information. Migraine Headache: Care Instructions Your Care Instructions Migraines are painful, throbbing headaches that often start on one side of the head. They may cause nausea and vomiting and make you sensitive to light, sound, or smell. Without treatment, migraines can last from 4 hours to a few days. Medicines can help prevent migraines or stop them after they have started. Your doctor can help you find which ones work best for you. Follow-up care is a key part of your treatment and safety.  Be sure to make and go to all appointments, and call your doctor if you are having problems. It's also a good idea to know your test results and keep a list of the medicines you take. How can you care for yourself at home? · Do not drive if you have taken a prescription pain medicine. · Rest in a quiet, dark room until your headache is gone. Close your eyes, and try to relax or go to sleep. Don't watch TV or read. · Put a cold, moist cloth or cold pack on the painful area for 10 to 20 minutes at a time. Put a thin cloth between the cold pack and your skin. · Use a warm, moist towel or a heating pad set on low to relax tight shoulder and neck muscles. · Have someone gently massage your neck and shoulders. · Take your medicines exactly as prescribed. Call your doctor if you think you are having a problem with your medicine. You will get more details on the specific medicines your doctor prescribes. · Be careful not to take pain medicine more often than the instructions allow. You could get worse or more frequent headaches when the medicine wears off. To prevent migraines · Keep a headache diary so you can figure out what triggers your headaches. Avoiding triggers may help you prevent headaches. Record when each headache began, how long it lasted, and what the pain was like. (Was it throbbing, aching, stabbing, or dull?) Write down any other symptoms you had with the headache, such as nausea, flashing lights or dark spots, or sensitivity to bright light or loud noise. Note if the headache occurred near your period. List anything that might have triggered the headache. Triggers may include certain foods (chocolate, cheese, wine) or odors, smoke, bright light, stress, or lack of sleep. · If your doctor has prescribed medicine for your migraines, take it as directed. You may have medicine that you take only when you get a migraine and medicine that you take all the time to help prevent migraines. ¨ If your doctor has prescribed medicine for when you get a headache, take it at the first sign of a migraine, unless your doctor has given you other instructions. ¨ If your doctor has prescribed medicine to prevent migraines, take it exactly as prescribed. Call your doctor if you think you are having a problem with your medicine. · Find healthy ways to deal with stress. Migraines are most common during or right after stressful times. Take time to relax before and after you do something that has caused a migraine in the past. 
· Try to keep your muscles relaxed by keeping good posture. Check your jaw, face, neck, and shoulder muscles for tension. Try to relax them. When you sit at a desk, change positions often. And make sure to stretch for 30 seconds each hour. · Get plenty of sleep and exercise. · Eat meals on a regular schedule. Avoid foods and drinks that often trigger migraines. These include chocolate, alcohol (especially red wine and port), aspartame, monosodium glutamate (MSG), and some additives found in foods (such as hot dogs, mora, cold cuts, aged cheeses, and pickled foods). · Limit caffeine. Don't drink too much coffee, tea, or soda. But don't quit caffeine suddenly. That can also give you migraines. · Do not smoke or allow others to smoke around you. If you need help quitting, talk to your doctor about stop-smoking programs and medicines. These can increase your chances of quitting for good. · If you are taking birth control pills or hormone therapy, talk to your doctor about whether they are triggering your migraines. When should you call for help? Call 911 anytime you think you may need emergency care. For example, call if: 
? · You have signs of a stroke. These may include: 
¨ Sudden numbness, paralysis, or weakness in your face, arm, or leg, especially on only one side of your body. ¨ Sudden vision changes. ¨ Sudden trouble speaking. ¨ Sudden confusion or trouble understanding simple statements. ¨ Sudden problems with walking or balance. ¨ A sudden, severe headache that is different from past headaches. ?Call your doctor now or seek immediate medical care if: 
? · You have new or worse nausea and vomiting. ? · You have a new or higher fever. ? · Your headache gets much worse. ? Watch closely for changes in your health, and be sure to contact your doctor if: 
? · You are not getting better after 2 days (48 hours). Where can you learn more? Go to http://vilma-francois.info/. Enter D094 in the search box to learn more about \"Migraine Headache: Care Instructions. \" Current as of: October 14, 2016 Content Version: 11.4 © 6278-6268 [employer]. Care instructions adapted under license by [employer] (which disclaims liability or warranty for this information). If you have questions about a medical condition or this instruction, always ask your healthcare professional. [address] [address] any warranty or liability for your use of this information. Sumatriptan (By mouth) Sumatriptan (mark-ma-TRIP-tan) Treats migraine headaches. Brand Name(s): Imitrex, Migraine Pack, Migranow There may be other brand names for this medicine. When This Medicine Should Not Be Used: This medicine is not right for everyone. Do not use if you had an allergic reaction to sumatriptan. Tell your doctor if you have heart or blood vessel problems, such as a history of heart attack, stroke, or heart rhythm problems. How to Use This Medicine:  
Tablet · Your doctor will tell you how much medicine to use. Do not use more than directed. · Swallow the tablet whole with water or other liquids. Do not crush, break, or chew it. · If this medicine does not help your headache at all, do not take more medicine. Call your doctor.  
· If your headache comes back or you do not get complete relief, wait at least 2 hours before you use another dose. If you feel you need to use the medicine more than 2 times in a day, call your doctor. · Read and follow the patient instructions that come with this medicine. Talk to your doctor or pharmacist if you have any questions. · Use sumatriptan only when you have a migraine. This medicine is not used on a regular schedule. · Store the medicine in a closed container at room temperature, away from heat, moisture, and direct light. Drugs and Foods to Avoid: Ask your doctor or pharmacist before using any other medicine, including over-the-counter medicines, vitamins, and herbal products. · Do not use this medicine if you have taken another migraine headache medicine in the past 24 hours, such as another triptan or an ergot medicine. These medicines include almotriptan, dihydroergotamine, eletriptan, ergotamine, frovatriptan, methysergide, rizatriptan, or zolmitriptan. · Do not use this medicine if you have taken an MAO inhibitor in the past 14 days. · Tell your doctor if you are using medicine to treat depression. Warnings While Using This Medicine: · Tell your doctor if you are pregnant, or if you have kidney disease, liver disease, diabetes, high blood pressure, high cholesterol, or a history of seizures. Tell your doctor if you have a family history of heart disease, a history of blood circulation problems (such as peripheral vascular disease), or if you smoke. · Do not breastfeed for at least 12 hours after you take this medicine. · This medicine should be used only for classic or common migraine headaches. It will not work for any other kind of headache or pain. · This medicine may cause the following problems: 
¨ Higher risk for abnormal heart rhythm, heart attack, or stroke ¨ Tightness or discomfort in your chest, neck, or jaw ¨ Spasms in the blood vessels, including Raynaud syndrome ¨ Serotonin syndrome (more likely if used with medicine to treat depression) ¨ High blood pressure · Your headaches may become worse if you use this medicine for 10 or more days per month. Keep a journal and write down how often your headaches occur and how often you take this medicine. · This medicine may make you dizzy or drowsy. Do not drive or do anything else that could be dangerous until you know how this medicine affects you. · Call your doctor if your symptoms do not improve or if they get worse. · Keep all medicine out of the reach of children. Never share your medicine with anyone. Possible Side Effects While Using This Medicine:  
Call your doctor right away if you notice any of these side effects: · Allergic reaction: Itching or hives, swelling in your face or hands, swelling or tingling in your mouth or throat, chest tightness, trouble breathing · Anxiety, restlessness, fever, sweating, muscle spasms, twitching, diarrhea, seeing or hearing things that are not there · Chest pain, especially if it spreads to your arms, jaw, back, or neck, trouble breathing, unusual sweating, faintness · Fast, pounding, or uneven heartbeat, dizziness · Numbness, tingling, cramps, unexplained pain in your hands, arms, legs, or feet, color changes in your fingers or toes · Seizure · Severe stomach pain, bloody diarrhea, nausea, or vomiting · Sudden severe headache (other than the one being treated) · Tightness or discomfort in your chest, neck, or jaw · Vision loss or vision changes that are not part of a usual migraine If you notice other side effects that you think are caused by this medicine, tell your doctor. Call your doctor for medical advice about side effects. You may report side effects to FDA at 2-541-FDA-0682 © 2017 [address] Information is for End User's use only and may not be sold, redistributed or otherwise used for commercial purposes. The above information is an  only.  It is not intended as medical advice for individual conditions or treatments. Talk to your doctor, nurse or pharmacist before following any medical regimen to see if it is safe and effective for you. Sumatriptan (By mouth) Sumatriptan (mark-ma-TRIP-tan) Treats migraine headaches. Brand Name(s): Imitrex, Migraine Pack, Migranow There may be other brand names for this medicine. When This Medicine Should Not Be Used: This medicine is not right for everyone. Do not use if you had an allergic reaction to sumatriptan. Tell your doctor if you have heart or blood vessel problems, such as a history of heart attack, stroke, or heart rhythm problems. How to Use This Medicine:  
Tablet · Your doctor will tell you how much medicine to use. Do not use more than directed. · Swallow the tablet whole with water or other liquids. Do not crush, break, or chew it. · If this medicine does not help your headache at all, do not take more medicine. Call your doctor. · If your headache comes back or you do not get complete relief, wait at least 2 hours before you use another dose. If you feel you need to use the medicine more than 2 times in a day, call your doctor. · Read and follow the patient instructions that come with this medicine. Talk to your doctor or pharmacist if you have any questions. · Use sumatriptan only when you have a migraine. This medicine is not used on a regular schedule. · Store the medicine in a closed container at room temperature, away from heat, moisture, and direct light. Drugs and Foods to Avoid: Ask your doctor or pharmacist before using any other medicine, including over-the-counter medicines, vitamins, and herbal products. · Do not use this medicine if you have taken another migraine headache medicine in the past 24 hours, such as another triptan or an ergot medicine. These medicines include almotriptan, dihydroergotamine, eletriptan, ergotamine, frovatriptan, methysergide, rizatriptan, or zolmitriptan. · Do not use this medicine if you have taken an MAO inhibitor in the past 14 days. · Tell your doctor if you are using medicine to treat depression. Warnings While Using This Medicine: · Tell your doctor if you are pregnant, or if you have kidney disease, liver disease, diabetes, high blood pressure, high cholesterol, or a history of seizures. Tell your doctor if you have a family history of heart disease, a history of blood circulation problems (such as peripheral vascular disease), or if you smoke. · Do not breastfeed for at least 12 hours after you take this medicine. · This medicine should be used only for classic or common migraine headaches. It will not work for any other kind of headache or pain. · This medicine may cause the following problems: 
¨ Higher risk for abnormal heart rhythm, heart attack, or stroke ¨ Tightness or discomfort in your chest, neck, or jaw ¨ Spasms in the blood vessels, including Raynaud syndrome ¨ Serotonin syndrome (more likely if used with medicine to treat depression) ¨ High blood pressure · Your headaches may become worse if you use this medicine for 10 or more days per month. Keep a journal and write down how often your headaches occur and how often you take this medicine. · This medicine may make you dizzy or drowsy. Do not drive or do anything else that could be dangerous until you know how this medicine affects you. · Call your doctor if your symptoms do not improve or if they get worse. · Keep all medicine out of the reach of children. Never share your medicine with anyone. Possible Side Effects While Using This Medicine:  
Call your doctor right away if you notice any of these side effects: · Allergic reaction: Itching or hives, swelling in your face or hands, swelling or tingling in your mouth or throat, chest tightness, trouble breathing · Anxiety, restlessness, fever, sweating, muscle spasms, twitching, diarrhea, seeing or hearing things that are not there · Chest pain, especially if it spreads to your arms, jaw, back, or neck, trouble breathing, unusual sweating, faintness · Fast, pounding, or uneven heartbeat, dizziness · Numbness, tingling, cramps, unexplained pain in your hands, arms, legs, or feet, color changes in your fingers or toes · Seizure · Severe stomach pain, bloody diarrhea, nausea, or vomiting · Sudden severe headache (other than the one being treated) · Tightness or discomfort in your chest, neck, or jaw · Vision loss or vision changes that are not part of a usual migraine If you notice other side effects that you think are caused by this medicine, tell your doctor. Call your doctor for medical advice about side effects. You may report side effects to FDA at 1-605-FDA-4516 © 2017 [address] Information is for End User's use only and may not be sold, redistributed or otherwise used for commercial purposes. The above information is an  only. It is not intended as medical advice for individual conditions or treatments. Talk to your doctor, nurse or pharmacist before following any medical regimen to see if it is safe and effective for you. Rhinitis: Care Instructions Your Care Instructions Rhinitis is swelling and irritation in the nose. Allergies and infections are often the cause. Your nose may run or feel stuffy. Other symptoms are itchy and sore eyes, ears, throat, and mouth. If allergies are the cause, your doctor may do tests to find out what you are allergic to. You may be able to stop symptoms if you avoid the things that cause them. Your doctor may suggest or prescribe medicine to ease your symptoms. Follow-up care is a key part of your treatment and safety. Be sure to make and go to all appointments, and call your doctor if you are having problems. It's also a good idea to know your test results and keep a list of the medicines you take. How can you care for yourself at home? · If your rhinitis is caused by allergies, try to find out what sets off (triggers) your symptoms. Take steps to avoid these triggers. ¨ Avoid yard work. It can stir up both pollen and mold. ¨ Do not smoke or allow others to smoke around you. If you need help quitting, talk to your doctor about stop-smoking programs and medicines. These can increase your chances of quitting for good. ¨ Do not use aerosol sprays, cleaning products, or perfumes. ¨ If pollen is one of your triggers, close your house and car windows during blooming season. ¨ Clean your house often to control dust. 
¨ Keep pets outside. · If your doctor recommends over-the-counter medicines to relieve symptoms, take your medicines exactly as prescribed. Call your doctor if you think you are having a problem with your medicine. · Use saline (saltwater) nasal washes to help keep your nasal passages open and wash out mucus and bacteria. You can buy saline nose drops at a grocery store or drugstore. Or you can make your own at home by adding 1 teaspoon of salt and 1 teaspoon of baking soda to 2 cups of distilled water. If you make your own, fill a bulb syringe with the solution, insert the tip into your nostril, and squeeze gently. Yusuf Luster your nose. When should you call for help? Call your doctor now or seek immediate medical care if: 
? · You are having trouble breathing. ? Watch closely for changes in your health, and be sure to contact your doctor if: 
? · Mucus from your nose gets thicker (like pus) or has new blood in it. ? · You have new or worse symptoms. ? · You do not get better as expected. Where can you learn more? Go to http://vilma-francois.info/. Enter M030 in the search box to learn more about \"Rhinitis: Care Instructions. \" Current as of: May 12, 2017 Content Version: 11.4 © 8786-8078 [employer].  Care instructions adapted under license by [address] (which disclaims liability or warranty for this information). If you have questions about a medical condition or this instruction, always ask your healthcare professional. [address] any warranty or liability for your use of this information. Introducing [facility]! Verónica Martin introduces [employer] patient portal. Now you can access parts of your medical record, email your doctor's office, and request medication refills online. 1. In your internet browser, go to [email or web address] 2. Click on the First Time User? Click Here link in the Sign In box. You will see the New Member Sign Up page. 3. Enter your [employer] Access Code exactly as it appears below. You will not need to use this code after youve completed the sign-up process. If you do not sign up before the expiration date, you must request a new code. · [employer] Access Code: [email or web address] Expires: 2/23/2018  6:45 PM 
 
4. Enter the last four digits of your Social Security Number (xxxx) and Date of Birth (mm/dd/yyyy) as indicated and click Submit. You will be taken to the next sign-up page. 5. Create a [employer] ID. This will be your [employer] login ID and cannot be changed, so think of one that is secure and easy to remember. 6. Create a [employer] password. You can change your password at any time. 7. Enter your Password Reset Question and Answer. This can be used at a later time if you forget your password. 8. Enter your e-mail address. You will receive e-mail notification when new information is available in [address] 9. Click Sign Up. You can now view and download portions of your medical record. 10. Click the Download Summary menu link to download a portable copy of your medical information. If you have questions, please visit the Frequently Asked Questions section of the [employer] website.  Remember, [employer] is NOT to be used for urgent needs. For medical emergencies, dial 911. Now available from your iPhone and Android! Please provide this summary of care documentation to your next provider. Your primary care clinician is listed as Dangelo Guadalupe. [facility]. If you have any questions after today's visit, please call [phone].

## 2018-01-15 NOTE — PROGRESS NOTES
Chief Complaint   Patient presents with    New Patient    Foot Swelling     Feet and ankles (worse on left side)    Migraine     1 or 2 per week. HPI:  Uday Parra is a 32y.o. year old female who is a new patient and is here to establish care. She was previously followed by [facility]. She presents today w/ c/o migraines for \"years\". SUBJECTIVE:  Honey Henriquez is a 32 y.o. female who complains of headaches for year(s). Description of pain: throbbing pain, squeezing pain, bilateral in the occipital area. Duration of individual headaches: 1-3 day(s), frequency several times per week. Associated symptoms: light sensitivity, nausea, sweating and vomiting. Pain relief: OTC NSAID's (taking 4-8 Motrins daily), cold packs, lying in a darkened room and sleep. Precipitating factors: caffeine intake. She admits to a history of recent head injury in 2015 from [address]. She has had a recent eye exam that resulted in reading glasses only at nights. 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 She is also a heavy caffeine drinker. She is consuming 4-5 sodas (mountain dew) daily, 1 cup coffee every morning. She has noticed leg swelling with the increased NSAID use. Sleep 2-3 times a night awakening d/t snoring, large, elongated uvula, nasal congestion x 3 weeks, sneezing, rhinorrhea, this is the first year she has had allergy symptoms. Prior neurological history: negative for no neurological problems. Neurologic Review of Systems - no TIA or stroke-like symptoms, no amaurosis, diplopia, abnormal speech, unilateral numbness or weakness. Current Outpatient Prescriptions   Medication Sig Dispense Refill    naproxen (NAPROSYN) 500 mg tablet Take 1 Tab by mouth every twelve (12) hours as needed for Pain. 20 Tab 0    oxyCODONE-acetaminophen (PERCOCET) 5-325 mg per tablet Take 1 Tab by mouth every four (4) hours as needed for Pain. Max Daily Amount: 6 Tabs.  20 Tab 0    dicyclomine (BENTYL) 20 mg tablet Take 1 Tab by mouth every six (6) hours as needed (abdominal cramps) for up to 20 doses. 20 Tab 0    ondansetron (ZOFRAN ODT) 4 mg disintegrating tablet Take 1 Tab by mouth every eight (8) hours as needed for Nausea. 10 Tab 0     The following sections were reviewed & updated as appropriate: PMH, PSH, PL, FMH,  and SH. Past Medical History:   Diagnosis Date    Other ill-defined conditions(799.89)     kidney infections    Other ill-defined conditions(799.89)     Migraines    Ovarian cyst     Ruptured ovarian cyst 2018       Past Surgical History:   Procedure Laterality Date    HX ORTHOPAEDIC      pelvic bone fx       There is no problem list on file for this patient. Family History   Problem Relation Age of Onset    No Known Problems Mother     No Known Problems Father     No Known Problems Brother        Social History     Social History    Marital status: SINGLE     Spouse name: N/A    Number of children: N/A    Years of education: N/A     Occupational History    Not on file. Social History Main Topics    Smoking status: Never Smoker    Smokeless tobacco: Never Used    Alcohol use No    Drug use: No    Sexual activity: Yes     Birth control/ protection: Injection     Other Topics Concern    Not on file     Social History Narrative       Prior to Admission medications    Medication Sig Start Date End Date Taking? Authorizing Provider   naproxen (NAPROSYN) 500 mg tablet Take 1 Tab by mouth every twelve (12) hours as needed for Pain. 12/27/17   Meagan Persaud MD   oxyCODONE-acetaminophen (PERCOCET) 5-325 mg per tablet Take 1 Tab by mouth every four (4) hours as needed for Pain. Max Daily Amount: 6 Tabs. 12/20/17   Santiago Mckeon MD   dicyclomine (BENTYL) 20 mg tablet Take 1 Tab by mouth every six (6) hours as needed (abdominal cramps) for up to 20 doses.  12/20/17   Santiago Mckeon MD   ondansetron (ZOFRAN ODT) 4 mg disintegrating tablet Take 1 Tab by mouth every eight (8) hours as needed for Nausea. 12/20/17   Shelley Ho MD        Allergies   Allergen Reactions    Ciprofloxacin Hives    Pcn [Penicillins] Hives          Review of Systems  A comprehensive review of systems was negative except for that written in the HPI. Objective:       Visit Vitals    /77 (BP 1 Location: Right arm, BP Patient Position: Sitting)    Pulse 84    Temp 95.9 °F (35.5 °C) (Oral)    Resp 18    Ht 5' 5.35\" (1.66 m)    Wt 190 lb 14.4 oz (86.6 kg)    SpO2 98%    BMI 31.42 kg/m2       Physical Exam  Gen: alert, oriented, no acute distress  Head: normocephalic, atraumatic  Ears: external auditory canals clear, TMs without erythema, +mild effusion  Eyes: pupils equal round reactive to light, sclera clear, conjunctiva clear  Nose: +edema, erythema, +clear discharge  Oral: moist mucus membranes, no oral lesions, no pharyngeal inflammation or exudate, +long uvula noted  Neck: symmetric normal sized thyroid, no carotid bruits, no jugular vein distention  Resp: no increase work of breathing, lungs clear to ausculation bilaterally, no wheezing, rales or rhonchi  CV: S1, S2 normal.  No murmurs, rubs, or gallops. Abd: soft, not tender, not distended. No hepatosplenomegaly. Normal bowel sounds. No hernias. No abdominal or renal bruits. Neuro: cranial nerves intact, normal strength and movement in all extremities, reflexes and sensation intact and symmetric. Skin: no lesion or rash  Extremities: no cyanosis or edema    Assessment & Plan:    ICD-10-CM ICD-9-CM    1. Migraine without aura and without status migrainosus, not intractable G43.009 346.10 SUMAtriptan (IMITREX) 100 mg tablet      SD THER/PROPH/DIAG INJECTION, SUBCUT/IM      ketorolac (TORADOL) 30 mg/mL (1 mL) injection      SLEEP MEDICINE REFERRAL   2. Acute seasonal allergic rhinitis, unspecified trigger J30.2 477.8 fluticasone (FLONASE) 50 mcg/actuation nasal spray      SLEEP MEDICINE REFERRAL   3.  Sleep disturbance G47.9 780.50 SLEEP MEDICINE REFERRAL   4. 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 lab results and schedule of future lab studies reviewed with patient  reviewed diet, exercise and weight control  reviewed medications and side effects in detail    Differential diagnosis and treatment options reviewed with patient who is in agreement with treatment plan as outlined below. Health Maintenance reviewed - reviewed, UTD. Recommended healthy diet low in carbohydrates, fats, sodium and cholesterol. Recommended regular cardiovascular exercise 3-6 times per week for 30-60 minutes daily. Chart is reviewed and updated today in the office. Records requested for other providers patient has seen and is currently seeing. Patient was offered a choice/choices in the treatment plan today. Patient expresses understanding of the plan and agrees with recommendations. Verbal and written instructions (see AVS) provided. See patient instructions for more. Patient expresses understanding of diagnosis and treatment plan.

## 2018-01-15 NOTE — PATIENT INSTRUCTIONS
Migraine Headache: Care Instructions  Your Care Instructions  Migraines are painful, throbbing headaches that often start on one side of the head. They may cause nausea and vomiting and make you sensitive to light, sound, or smell. Without treatment, migraines can last from 4 hours to a few days. Medicines can help prevent migraines or stop them after they have started. Your doctor can help you find which ones work best for you. Follow-up care is a key part of your treatment and safety. Be sure to make and go to all appointments, and call your doctor if you are having problems. It's also a good idea to know your test results and keep a list of the medicines you take. How can you care for yourself at home? · Do not drive if you have taken a prescription pain medicine. · Rest in a quiet, dark room until your headache is gone. Close your eyes, and try to relax or go to sleep. Don't watch TV or read. · Put a cold, moist cloth or cold pack on the painful area for 10 to 20 minutes at a time. Put a thin cloth between the cold pack and your skin. · Use a warm, moist towel or a heating pad set on low to relax tight shoulder and neck muscles. · Have someone gently massage your neck and shoulders. · Take your medicines exactly as prescribed. Call your doctor if you think you are having a problem with your medicine. You will get more details on the specific medicines your doctor prescribes. · Be careful not to take pain medicine more often than the instructions allow. You could get worse or more frequent headaches when the medicine wears off. To prevent migraines  · Keep a headache diary so you can figure out what triggers your headaches. Avoiding triggers may help you prevent headaches. Record when each headache began, how long it lasted, and what the pain was like.  (Was it throbbing, aching, stabbing, or dull?) Write down any other symptoms you had with the headache, such as nausea, flashing lights or dark spots, or sensitivity to bright light or loud noise. Note if the headache occurred near your period. List anything that might have triggered the headache. Triggers may include certain foods (chocolate, cheese, wine) or odors, smoke, bright light, stress, or lack of sleep. · If your doctor has prescribed medicine for your migraines, take it as directed. You may have medicine that you take only when you get a migraine and medicine that you take all the time to help prevent migraines. ¨ If your doctor has prescribed medicine for when you get a headache, take it at the first sign of a migraine, unless your doctor has given you other instructions. ¨ If your doctor has prescribed medicine to prevent migraines, take it exactly as prescribed. Call your doctor if you think you are having a problem with your medicine. · Find healthy ways to deal with stress. Migraines are most common during or right after stressful times. Take time to relax before and after you do something that has caused a migraine in the past.  · Try to keep your muscles relaxed by keeping good posture. Check your jaw, face, neck, and shoulder muscles for tension. Try to relax them. When you sit at a desk, change positions often. And make sure to stretch for 30 seconds each hour. · Get plenty of sleep and exercise. · Eat meals on a regular schedule. Avoid foods and drinks that often trigger migraines. These include chocolate, alcohol (especially red wine and port), aspartame, monosodium glutamate (MSG), and some additives found in foods (such as hot dogs, mora, cold cuts, aged cheeses, and pickled foods). · Limit caffeine. Don't drink too much coffee, tea, or soda. But don't quit caffeine suddenly. That can also give you migraines. · Do not smoke or allow others to smoke around you. If you need help quitting, talk to your doctor about stop-smoking programs and medicines. These can increase your chances of quitting for good.   · If you are taking birth control pills or hormone therapy, talk to your doctor about whether they are triggering your migraines. When should you call for help? Call 911 anytime you think you may need emergency care. For example, call if:  ? · You have signs of a stroke. These may include:  ¨ Sudden numbness, paralysis, or weakness in your face, arm, or leg, especially on only one side of your body. ¨ Sudden vision changes. ¨ Sudden trouble speaking. ¨ Sudden confusion or trouble understanding simple statements. ¨ Sudden problems with walking or balance. ¨ A sudden, severe headache that is different from past headaches. ?Call your doctor now or seek immediate medical care if:  ? · You have new or worse nausea and vomiting. ? · You have a new or higher fever. ? · Your headache gets much worse. ? Watch closely for changes in your health, and be sure to contact your doctor if:  ? · You are not getting better after 2 days (48 hours). Where can you learn more? Go to http://vilma-francois.info/. Enter H486 in the search box to learn more about \"Migraine Headache: Care Instructions. \"  Current as of: October 14, 2016  Content Version: 11.4  © 0562-6941 [employer]. Care instructions adapted under license by [employer] (which disclaims liability or warranty for this information). If you have questions about a medical condition or this instruction, always ask your healthcare professional. [place] [address] any warranty or liability for your use of this information. Migraine Headache: Care Instructions  Your Care Instructions  Migraines are painful, throbbing headaches that often start on one side of the head. They may cause nausea and vomiting and make you sensitive to light, sound, or smell. Without treatment, migraines can last from 4 hours to a few days. Medicines can help prevent migraines or stop them after they have started.  Your doctor can help you find which ones work best for you. Follow-up care is a key part of your treatment and safety. Be sure to make and go to all appointments, and call your doctor if you are having problems. It's also a good idea to know your test results and keep a list of the medicines you take. How can you care for yourself at home? · Do not drive if you have taken a prescription pain medicine. · Rest in a quiet, dark room until your headache is gone. Close your eyes, and try to relax or go to sleep. Don't watch TV or read. · Put a cold, moist cloth or cold pack on the painful area for 10 to 20 minutes at a time. Put a thin cloth between the cold pack and your skin. · Use a warm, moist towel or a heating pad set on low to relax tight shoulder and neck muscles. · Have someone gently massage your neck and shoulders. · Take your medicines exactly as prescribed. Call your doctor if you think you are having a problem with your medicine. You will get more details on the specific medicines your doctor prescribes. · Be careful not to take pain medicine more often than the instructions allow. You could get worse or more frequent headaches when the medicine wears off. To prevent migraines  · Keep a headache diary so you can figure out what triggers your headaches. Avoiding triggers may help you prevent headaches. Record when each headache began, how long it lasted, and what the pain was like. (Was it throbbing, aching, stabbing, or dull?) Write down any other symptoms you had with the headache, such as nausea, flashing lights or dark spots, or sensitivity to bright light or loud noise. Note if the headache occurred near your period. List anything that might have triggered the headache. Triggers may include certain foods (chocolate, cheese, wine) or odors, smoke, bright light, stress, or lack of sleep. · If your doctor has prescribed medicine for your migraines, take it as directed.  You may have medicine that you take only when you get a migraine and medicine that you take all the time to help prevent migraines. ¨ If your doctor has prescribed medicine for when you get a headache, take it at the first sign of a migraine, unless your doctor has given you other instructions. ¨ If your doctor has prescribed medicine to prevent migraines, take it exactly as prescribed. Call your doctor if you think you are having a problem with your medicine. · Find healthy ways to deal with stress. Migraines are most common during or right after stressful times. Take time to relax before and after you do something that has caused a migraine in the past.  · Try to keep your muscles relaxed by keeping good posture. Check your jaw, face, neck, and shoulder muscles for tension. Try to relax them. When you sit at a desk, change positions often. And make sure to stretch for 30 seconds each hour. · Get plenty of sleep and exercise. · Eat meals on a regular schedule. Avoid foods and drinks that often trigger migraines. These include chocolate, alcohol (especially red wine and port), aspartame, monosodium glutamate (MSG), and some additives found in foods (such as hot dogs, mora, cold cuts, aged cheeses, and pickled foods). · Limit caffeine. Don't drink too much coffee, tea, or soda. But don't quit caffeine suddenly. That can also give you migraines. · Do not smoke or allow others to smoke around you. If you need help quitting, talk to your doctor about stop-smoking programs and medicines. These can increase your chances of quitting for good. · If you are taking birth control pills or hormone therapy, talk to your doctor about whether they are triggering your migraines. When should you call for help? Call 911 anytime you think you may need emergency care. For example, call if:  ? · You have signs of a stroke. These may include:  ¨ Sudden numbness, paralysis, or weakness in your face, arm, or leg, especially on only one side of your body.   ¨ Sudden vision changes. ¨ Sudden trouble speaking. ¨ Sudden confusion or trouble understanding simple statements. ¨ Sudden problems with walking or balance. ¨ A sudden, severe headache that is different from past headaches. ?Call your doctor now or seek immediate medical care if:  ? · You have new or worse nausea and vomiting. ? · You have a new or higher fever. ? · Your headache gets much worse. ? Watch closely for changes in your health, and be sure to contact your doctor if:  ? · You are not getting better after 2 days (48 hours). Where can you learn more? Go to http://vilmaBigMachinesfrancois.info/. Enter U368 in the search box to learn more about \"Migraine Headache: Care Instructions. \"  Current as of: October 14, 2016  Content Version: 11.4  © 9070-6356 [employer] Care instructions adapted under license by [employer] (which disclaims liability or warranty for this information). If you have questions about a medical condition or this instruction, always ask your healthcare professional. [place] [address] any warranty or liability for your use of this information. Sumatriptan (By mouth)   Sumatriptan (mark-ma-TRIP-tan)  Treats migraine headaches. Brand Name(s): Imitrex, Migraine Pack, Migranow   There may be other brand names for this medicine. When This Medicine Should Not Be Used: This medicine is not right for everyone. Do not use if you had an allergic reaction to sumatriptan. Tell your doctor if you have heart or blood vessel problems, such as a history of heart attack, stroke, or heart rhythm problems. How to Use This Medicine:   Tablet  · Your doctor will tell you how much medicine to use. Do not use more than directed. · Swallow the tablet whole with water or other liquids. Do not crush, break, or chew it. · If this medicine does not help your headache at all, do not take more medicine. Call your doctor.   · If your headache comes back or you do not get complete relief, wait at least 2 hours before you use another dose. If you feel you need to use the medicine more than 2 times in a day, call your doctor. · Read and follow the patient instructions that come with this medicine. Talk to your doctor or pharmacist if you have any questions. · Use sumatriptan only when you have a migraine. This medicine is not used on a regular schedule. · Store the medicine in a closed container at room temperature, away from heat, moisture, and direct light. Drugs and Foods to Avoid:   Ask your doctor or pharmacist before using any other medicine, including over-the-counter medicines, vitamins, and herbal products. · Do not use this medicine if you have taken another migraine headache medicine in the past 24 hours, such as another triptan or an ergot medicine. These medicines include almotriptan, dihydroergotamine, eletriptan, ergotamine, frovatriptan, methysergide, rizatriptan, or zolmitriptan. · Do not use this medicine if you have taken an MAO inhibitor in the past 14 days. · Tell your doctor if you are using medicine to treat depression. Warnings While Using This Medicine:   · Tell your doctor if you are pregnant, or if you have kidney disease, liver disease, diabetes, high blood pressure, high cholesterol, or a history of seizures. Tell your doctor if you have a family history of heart disease, a history of blood circulation problems (such as peripheral vascular disease), or if you smoke. · Do not breastfeed for at least 12 hours after you take this medicine. · This medicine should be used only for classic or common migraine headaches. It will not work for any other kind of headache or pain.   · This medicine may cause the following problems:  ¨ Higher risk for abnormal heart rhythm, heart attack, or stroke  ¨ Tightness or discomfort in your chest, neck, or jaw  ¨ Spasms in the blood vessels, including Raynaud syndrome  ¨ Serotonin syndrome (more likely if used with medicine to treat depression)  ¨ High blood pressure  · Your headaches may become worse if you use this medicine for 10 or more days per month. Keep a journal and write down how often your headaches occur and how often you take this medicine. · This medicine may make you dizzy or drowsy. Do not drive or do anything else that could be dangerous until you know how this medicine affects you. · Call your doctor if your symptoms do not improve or if they get worse. · Keep all medicine out of the reach of children. Never share your medicine with anyone. Possible Side Effects While Using This Medicine:   Call your doctor right away if you notice any of these side effects:  · Allergic reaction: Itching or hives, swelling in your face or hands, swelling or tingling in your mouth or throat, chest tightness, trouble breathing  · Anxiety, restlessness, fever, sweating, muscle spasms, twitching, diarrhea, seeing or hearing things that are not there  · Chest pain, especially if it spreads to your arms, jaw, back, or neck, trouble breathing, unusual sweating, faintness  · Fast, pounding, or uneven heartbeat, dizziness  · Numbness, tingling, cramps, unexplained pain in your hands, arms, legs, or feet, color changes in your fingers or toes  · Seizure  · Severe stomach pain, bloody diarrhea, nausea, or vomiting  · Sudden severe headache (other than the one being treated)  · Tightness or discomfort in your chest, neck, or jaw  · Vision loss or vision changes that are not part of a usual migraine  If you notice other side effects that you think are caused by this medicine, tell your doctor. Call your doctor for medical advice about side effects. You may report side effects to FDA at 2-339-FDA-2144  © 2017 [address] Information is for End User's use only and may not be sold, redistributed or otherwise used for commercial purposes. The above information is an  only.  It is not intended as medical advice for individual conditions or treatments. Talk to your doctor, nurse or pharmacist before following any medical regimen to see if it is safe and effective for you. Sumatriptan (By mouth)   Sumatriptan (mark-ma-TRIP-tan)  Treats migraine headaches. Brand Name(s): Imitrex, Migraine Pack, Migranow   There may be other brand names for this medicine. When This Medicine Should Not Be Used: This medicine is not right for everyone. Do not use if you had an allergic reaction to sumatriptan. Tell your doctor if you have heart or blood vessel problems, such as a history of heart attack, stroke, or heart rhythm problems. How to Use This Medicine:   Tablet  · Your doctor will tell you how much medicine to use. Do not use more than directed. · Swallow the tablet whole with water or other liquids. Do not crush, break, or chew it. · If this medicine does not help your headache at all, do not take more medicine. Call your doctor. · If your headache comes back or you do not get complete relief, wait at least 2 hours before you use another dose. If you feel you need to use the medicine more than 2 times in a day, call your doctor. · Read and follow the patient instructions that come with this medicine. Talk to your doctor or pharmacist if you have any questions. · Use sumatriptan only when you have a migraine. This medicine is not used on a regular schedule. · Store the medicine in a closed container at room temperature, away from heat, moisture, and direct light. Drugs and Foods to Avoid:   Ask your doctor or pharmacist before using any other medicine, including over-the-counter medicines, vitamins, and herbal products. · Do not use this medicine if you have taken another migraine headache medicine in the past 24 hours, such as another triptan or an ergot medicine. These medicines include almotriptan, dihydroergotamine, eletriptan, ergotamine, frovatriptan, methysergide, rizatriptan, or zolmitriptan.   · Do not use this medicine if you have taken an MAO inhibitor in the past 14 days. · Tell your doctor if you are using medicine to treat depression. Warnings While Using This Medicine:   · Tell your doctor if you are pregnant, or if you have kidney disease, liver disease, diabetes, high blood pressure, high cholesterol, or a history of seizures. Tell your doctor if you have a family history of heart disease, a history of blood circulation problems (such as peripheral vascular disease), or if you smoke. · Do not breastfeed for at least 12 hours after you take this medicine. · This medicine should be used only for classic or common migraine headaches. It will not work for any other kind of headache or pain. · This medicine may cause the following problems:  ¨ Higher risk for abnormal heart rhythm, heart attack, or stroke  ¨ Tightness or discomfort in your chest, neck, or jaw  ¨ Spasms in the blood vessels, including Raynaud syndrome  ¨ Serotonin syndrome (more likely if used with medicine to treat depression)  ¨ High blood pressure  · Your headaches may become worse if you use this medicine for 10 or more days per month. Keep a journal and write down how often your headaches occur and how often you take this medicine. · This medicine may make you dizzy or drowsy. Do not drive or do anything else that could be dangerous until you know how this medicine affects you. · Call your doctor if your symptoms do not improve or if they get worse. · Keep all medicine out of the reach of children. Never share your medicine with anyone.   Possible Side Effects While Using This Medicine:   Call your doctor right away if you notice any of these side effects:  · Allergic reaction: Itching or hives, swelling in your face or hands, swelling or tingling in your mouth or throat, chest tightness, trouble breathing  · Anxiety, restlessness, fever, sweating, muscle spasms, twitching, diarrhea, seeing or hearing things that are not there  · Chest pain, especially if it spreads to your arms, jaw, back, or neck, trouble breathing, unusual sweating, faintness  · Fast, pounding, or uneven heartbeat, dizziness  · Numbness, tingling, cramps, unexplained pain in your hands, arms, legs, or feet, color changes in your fingers or toes  · Seizure  · Severe stomach pain, bloody diarrhea, nausea, or vomiting  · Sudden severe headache (other than the one being treated)  · Tightness or discomfort in your chest, neck, or jaw  · Vision loss or vision changes that are not part of a usual migraine  If you notice other side effects that you think are caused by this medicine, tell your doctor. Call your doctor for medical advice about side effects. You may report side effects to FDA at 5-821-FDA-0976  © 2017 [address] [employer] Information is for End User's use only and may not be sold, redistributed or otherwise used for commercial purposes. The above information is an  only. It is not intended as medical advice for individual conditions or treatments. Talk to your doctor, nurse or pharmacist before following any medical regimen to see if it is safe and effective for you. Rhinitis: Care Instructions  Your Care Instructions  Rhinitis is swelling and irritation in the nose. Allergies and infections are often the cause. Your nose may run or feel stuffy. Other symptoms are itchy and sore eyes, ears, throat, and mouth. If allergies are the cause, your doctor may do tests to find out what you are allergic to. You may be able to stop symptoms if you avoid the things that cause them. Your doctor may suggest or prescribe medicine to ease your symptoms. Follow-up care is a key part of your treatment and safety. Be sure to make and go to all appointments, and call your doctor if you are having problems. It's also a good idea to know your test results and keep a list of the medicines you take. How can you care for yourself at home?   · If your rhinitis is caused by allergies, try to find out what sets off (triggers) your symptoms. Take steps to avoid these triggers. ¨ Avoid yard work. It can stir up both pollen and mold. ¨ Do not smoke or allow others to smoke around you. If you need help quitting, talk to your doctor about stop-smoking programs and medicines. These can increase your chances of quitting for good. ¨ Do not use aerosol sprays, cleaning products, or perfumes. ¨ If pollen is one of your triggers, close your house and car windows during blooming season. ¨ Clean your house often to control dust.  ¨ Keep pets outside. · If your doctor recommends over-the-counter medicines to relieve symptoms, take your medicines exactly as prescribed. Call your doctor if you think you are having a problem with your medicine. · Use saline (saltwater) nasal washes to help keep your nasal passages open and wash out mucus and bacteria. You can buy saline nose drops at a grocery store or drugstore. Or you can make your own at home by adding 1 teaspoon of salt and 1 teaspoon of baking soda to 2 cups of distilled water. If you make your own, fill a bulb syringe with the solution, insert the tip into your nostril, and squeeze gently. Mary Jane Abreu your nose. When should you call for help? Call your doctor now or seek immediate medical care if:  ? · You are having trouble breathing. ? Watch closely for changes in your health, and be sure to contact your doctor if:  ? · Mucus from your nose gets thicker (like pus) or has new blood in it. ? · You have new or worse symptoms. ? · You do not get better as expected. Where can you learn more? Go to http://vilma-francois.info/. Enter M030 in the search box to learn more about \"Rhinitis: Care Instructions. \"  Current as of: May 12, 2017  Content Version: 11.4  © 5137-3355 [employer].  Care instructions adapted under license by [employer] (which disclaims liability or warranty for this information). If you have questions about a medical condition or this instruction, always ask your healthcare professional. [place] [address] any warranty or liability for your use of this information.

## 2018-01-15 NOTE — PROGRESS NOTES
Jose J Moore  Identified pt with two pt identifiers(name and ). Chief Complaint   Patient presents with    New Patient    Foot Swelling     Feet and ankles (worse on left side)    Migraine     1 or 2 per week. 1. Have you been to the ER, urgent care clinic since your last visit? Hospitalized since your last visit? Yes, [facility] for ruptured cyst and work-related injury    2. Have you seen or consulted any other health care providers outside of the [address] [place] [address] Bobby since your last visit? Include any pap smears or colon screening. Yes, [facility]      Gabe Dancer, NP notified of reason for visit, vitals and flowsheets obtained on patients. Patient offered paperwork for advance directive during today's visit but pt is not interested at this time     Reviewed record In preparation for visit, huddled with provider and have obtained necessary documentation      There are no preventive care reminders to display for this patient.     Wt Readings from Last 3 Encounters:   01/15/18 190 lb 14.4 oz (86.6 kg)   17 189 lb 13.1 oz (86.1 kg)   17 191 lb (86.6 kg)     Temp Readings from Last 3 Encounters:   01/15/18 95.9 °F (35.5 °C) (Oral)   17 97 °F (36.1 °C)   17 98.2 °F (36.8 °C)     BP Readings from Last 3 Encounters:   01/15/18 120/77   17 129/86   17 100/71     Pulse Readings from Last 3 Encounters:   01/15/18 84   17 84   17 81     Vitals:    01/15/18 1142   BP: 120/77   Pulse: 84   Resp: 18   Temp: 95.9 °F (35.5 °C)   TempSrc: Oral   SpO2: 98%   Weight: 190 lb 14.4 oz (86.6 kg)   Height: 5' 5.35\" (1.66 m)   PainSc:   8   PainLoc: Head         Learning Assessment:  :     Learning Assessment 1/15/2018   PRIMARY LEARNER Patient   HIGHEST LEVEL OF EDUCATION - PRIMARY LEARNER  2 YEARS OF COLLEGE   BARRIERS PRIMARY LEARNER NONE   CO-LEARNER CAREGIVER No   PRIMARY LANGUAGE ENGLISH   LEARNER PREFERENCE PRIMARY LISTENING   ANSWERED BY Patient RELATIONSHIP SELF       Depression Screening:  :     PHQ over the last two weeks 1/15/2018   Little interest or pleasure in doing things Not at all   Feeling down, depressed or hopeless Not at all   Total Score PHQ 2 0       Fall Risk Assessment:  :     Fall Risk Assessment, last 12 mths 1/15/2018   Able to walk? Yes   Fall in past 12 months? Yes   Fall with injury? Yes   Number of falls in past 12 months 1   Fall Risk Score 2       Abuse Screening:  :     Abuse Screening Questionnaire 1/15/2018   Do you ever feel afraid of your partner? N   Are you in a relationship with someone who physically or mentally threatens you? N   Is it safe for you to go home? Y       ADL Screening:  :     No flowsheet data found. After obtaining consent, and per orders of Urszula Bush NP, injection of Toradol IM 60 mg/2mL given by Jeff Juares LPN. Patient instructed to remain in clinic for 15 minutes afterwards, and to report any adverse reaction to me immediately. Patient is accompanied by self I have received verbal consent from Soni Galvin to discuss any/all medical information while they are present in the room. Medication reconciliation up to date and corrected with patient at this time.

## 2018-01-28 ENCOUNTER — HOSPITAL ENCOUNTER (EMERGENCY)
Age: 28
Discharge: LEFT BEFORE DISCHARGE | End: 2018-01-28
Attending: EMERGENCY MEDICINE | Admitting: EMERGENCY MEDICINE
Payer: COMMERCIAL

## 2018-01-28 VITALS
DIASTOLIC BLOOD PRESSURE: 83 MMHG | SYSTOLIC BLOOD PRESSURE: 135 MMHG | BODY MASS INDEX: 30.29 KG/M2 | WEIGHT: 188.49 LBS | TEMPERATURE: 97.5 F | HEART RATE: 85 BPM | OXYGEN SATURATION: 95 % | HEIGHT: 66 IN | RESPIRATION RATE: 16 BRPM

## 2018-01-28 PROCEDURE — 75810000275 HC EMERGENCY DEPT VISIT NO LEVEL OF CARE

## 2018-01-28 PROCEDURE — 99281 EMR DPT VST MAYX REQ PHY/QHP: CPT

## 2018-01-30 ENCOUNTER — OFFICE VISIT (OUTPATIENT)
Dept: FAMILY MEDICINE CLINIC | Age: 28
End: 2018-01-30

## 2018-01-30 VITALS
DIASTOLIC BLOOD PRESSURE: 68 MMHG | HEART RATE: 73 BPM | TEMPERATURE: 96.6 F | HEIGHT: 66 IN | RESPIRATION RATE: 18 BRPM | BODY MASS INDEX: 30.55 KG/M2 | OXYGEN SATURATION: 100 % | WEIGHT: 190.1 LBS | SYSTOLIC BLOOD PRESSURE: 128 MMHG

## 2018-01-30 DIAGNOSIS — G43.919 INTRACTABLE MIGRAINE WITHOUT STATUS MIGRAINOSUS, UNSPECIFIED MIGRAINE TYPE: Primary | ICD-10-CM

## 2018-01-30 DIAGNOSIS — G47.9 SLEEP DISTURBANCE: ICD-10-CM

## 2018-01-30 NOTE — MR AVS SNAPSHOT
Daniel Brandt 
 
 
 [address] [place] [address] 
[address] [facility] [address] 
[phone] Patient: Xavier Colbert MRN:  [id number] Visit Information Date & Time Provider Department Dept. Phone Encounter #  
 1/30/2018  8:20 AM Sebastian Pratt NP [facility] [phone] [id number] Follow-up Instructions Return in about 4 weeks (around 2/27/2018) for Medication Check, Migraines. Your Appointments 5/7/2018  3:00 PM  
New Patient with Patricio Dow MD  
[facility] ([address]) Appt Note: NP_ ref by Dr Goldie Henning_ migraines, snoring_ [facility] [address] [address] [address] [place] [address]., [address] [phone] Upcoming Health Maintenance Date Due  
 PAP AKA CERVICAL CYTOLOGY 10/17/2020 DTaP/Tdap/Td series (2 - Td) 1/15/2028 Allergies as of 1/30/2018  Review Complete On: 1/30/2018 By: Sebastian Pratt NP Severity Noted Reaction Type Reactions Ciprofloxacin  05/05/2010    Hives Pcn [Penicillins]  05/05/2010    Hives Current Immunizations  Never Reviewed No immunizations on file. Not reviewed this visit You Were Diagnosed With   
  
 Codes Comments Intractable migraine without status migrainosus, unspecified migraine type    -  Primary ICD-10-CM: H58.578 ICD-9-CM: 346.91 Sleep disturbance     ICD-10-CM: G47.9 ICD-9-CM: 780.50 Vitals BP Pulse Temp Resp Height(growth percentile) Weight(growth percentile) 128/68 (BP 1 Location: Left arm, BP Patient Position: Sitting) 73 96.6 °F (35.9 °C) (Oral) 18 5' 6\" (1.676 m) 190 lb 1.6 oz (86.2 kg) SpO2 BMI OB Status Smoking Status 100% 30.68 kg/m2 Injection Never Smoker BMI and BSA Data Body Mass Index Body Surface Area  
 30.68 kg/m 2 2 m 2 Preferred Pharmacy Pharmacy Name Phone [facility] [address] [address] Delio Sun [phone] Your Updated Medication List  
  
   
This list is accurate as of: 1/30/18  9:08 AM.  Always use your most recent med list.  
  
  
  
  
 fluticasone 50 mcg/actuation nasal spray Commonly known as:  Lorry Sinning 2 Sprays by Both Nostrils route daily. Indications: Allergic Rhinitis SUMAtriptan 100 mg tablet Commonly known as:  IMITREX Take 1 Tab by mouth once as needed for Migraine. May repeat x 1 in 2 hours prn, maximum 2 per day We Performed the Following REFERRAL TO NEUROLOGY [FVR30 Custom] Follow-up Instructions Return in about 4 weeks (around 2/27/2018) for Medication Check, Migraines. Referral Information Referral ID Referred By Referred To  
  
 [id number] MD Jolene Lopes [address] [facility], [address] [facility] Phone: [phone] Fax: [phone] Visits Status Start Date End Date 1 New Request 1/30/18 1/30/19 If your referral has a status of pending review or denied, additional information will be sent to support the outcome of this decision. Patient Instructions Recurring Migraine Headache: Care Instructions Your Care Instructions Migraines are painful, throbbing headaches. They often start on one side of the head. They may cause nausea and vomiting and make you sensitive to light, sound, or smell. Some people may have only a few migraines throughout life. Others have them as often as several times a month. The goal of treatment is to reduce the number of migraines you have and relieve your symptoms. Even with treatment, you may continue to have migraines. You play an important role in dealing with your headaches. Work on avoiding things that seem to trigger your migraines. When you feel a headache coming on, act quickly to stop it before it gets worse. Follow-up care is a key part of your treatment and safety. Be sure to make and go to all appointments, and call your doctor if you are having problems. It's also a good idea to know your test results and keep a list of the medicines you take. How can you care for yourself at home? · Do not drive if you have taken a prescription pain medicine. · Rest in a quiet, dark room until your headache is gone. Close your eyes and try to relax or go to sleep. Do not watch TV or read. · Put a cold, moist cloth or cold pack on the painful area for 10 to 20 minutes at a time. Put a thin cloth between the cold pack and your skin. · Have someone gently massage your neck and shoulders. · Take your medicines exactly as prescribed. Call your doctor if you think you are having a problem with your medicine. You will get more details on the specific medicines your doctor prescribes. To prevent migraines · Keep a headache diary so you can figure out what triggers your headaches. Avoiding triggers may help you prevent headaches. Record when each headache began, how long it lasted, and what the pain was like. Use words like throbbing, aching, stabbing, or dull. Write down any other symptoms you had with the headache. These may include nausea, flashing lights or dark spots, or sensitivity to bright light or loud noise. Note if the headache occurred near your period. List anything that might have triggered the headache. Triggers may include certain foods (chocolate, cheese, wine) or odors, smoke, bright light, stress, or lack of sleep. · If your doctor has prescribed medicine for your migraines, take it as directed. You may have medicine that you take only when you get a migraine and medicine that you take all the time to help prevent migraines. ¨ If your doctor has prescribed medicine for when you get a headache, take it at the first sign of a migraine, unless your doctor has given you other instructions. ¨ If your doctor has prescribed medicine to prevent migraines, take it exactly as prescribed. Call your doctor if you think you are having a problem with your medicine. · Find healthy ways to deal with stress. Migraines are most common during or right after stressful times. Take time to relax before and after you do something that has caused a migraine in the past. 
· Try to keep your muscles relaxed by keeping good posture. Check your jaw, face, neck, and shoulder muscles for tension. Try to relax them. When sitting at a desk, change positions often. Stretch for 30 seconds each hour. · Get regular sleep and exercise. · Eat regular meals, and avoid foods and drinks that often trigger migraines. These include chocolate and alcohol, especially red wine and port. Chemicals used in food, such as aspartame and monosodium glutamate (MSG), also can trigger migraines. So can some food additives, such as those found in hot dogs, mora, cold cuts, aged cheeses, and pickled foods. · Limit caffeine by not drinking too much coffee, tea, or soda. Do not quit caffeine suddenly, because that can also give you migraines. · Do not smoke or allow others to smoke around you. If you need help quitting, talk to your doctor about stop-smoking programs and medicines. These can increase your chances of quitting for good. · If you are taking birth control pills or hormone therapy, talk to your doctor about whether they are triggering your migraines. When should you call for help? Call 911 anytime you think you may need emergency care. For example, call if: 
? · You have symptoms of a stroke. These may include: 
¨ Sudden numbness, tingling, weakness, or loss of movement in your face, arm, or leg, especially on only one side of your body. ¨ Sudden vision changes. ¨ Sudden trouble speaking. ¨ Sudden confusion or trouble understanding simple statements. ¨ Sudden problems with walking or balance. ¨ A sudden, severe headache that is different from past headaches. ?Call your doctor now or seek immediate medical care if: 
? · You develop a fever and a stiff neck. ? · You have new nausea and vomiting, or you cannot keep down food or liquids. ? Watch closely for changes in your health, and be sure to contact your doctor if: 
? · You have a headache that does not get better within 1 or 2 days. ? · Your headaches get worse or happen more often. Where can you learn more? Go to http://vilma-francois.info/. Enter V975 in the search box to learn more about \"Recurring Migraine Headache: Care Instructions. \" Current as of: October 14, 2016 Content Version: 11.4 © 1600-4565 [employer]. Care instructions adapted under license by [employer] (which disclaims liability or warranty for this information). If you have questions about a medical condition or this instruction, always ask your healthcare professional. [place] [address] any warranty or liability for your use of this information. Introducing [facility]! Dear Elizabeth Sarabia: Thank you for requesting a [employer] account. Our records indicate that you already have an active [employer] account. You can access your account anytime at [email or web address] Did you know that you can access your hospital and ER discharge instructions at any time in [employer]? You can also review all of your test results from your hospital stay or ER visit. Additional Information If you have questions, please visit the Frequently Asked Questions section of the [employer] website at [email or web address] Remember, [employer] is NOT to be used for urgent needs. For medical emergencies, dial 911. Now available from your iPhone and Android! Please provide this summary of care documentation to your next provider. Your primary care clinician is listed as Kaye Perales. If you have any questions after today's visit, please call [phone].

## 2018-01-30 NOTE — PATIENT INSTRUCTIONS
Recurring Migraine Headache: Care Instructions  Your Care Instructions  Migraines are painful, throbbing headaches. They often start on one side of the head. They may cause nausea and vomiting and make you sensitive to light, sound, or smell. Some people may have only a few migraines throughout life. Others have them as often as several times a month. The goal of treatment is to reduce the number of migraines you have and relieve your symptoms. Even with treatment, you may continue to have migraines. You play an important role in dealing with your headaches. Work on avoiding things that seem to trigger your migraines. When you feel a headache coming on, act quickly to stop it before it gets worse. Follow-up care is a key part of your treatment and safety. Be sure to make and go to all appointments, and call your doctor if you are having problems. It's also a good idea to know your test results and keep a list of the medicines you take. How can you care for yourself at home? · Do not drive if you have taken a prescription pain medicine. · Rest in a quiet, dark room until your headache is gone. Close your eyes and try to relax or go to sleep. Do not watch TV or read. · Put a cold, moist cloth or cold pack on the painful area for 10 to 20 minutes at a time. Put a thin cloth between the cold pack and your skin. · Have someone gently massage your neck and shoulders. · Take your medicines exactly as prescribed. Call your doctor if you think you are having a problem with your medicine. You will get more details on the specific medicines your doctor prescribes. To prevent migraines  · Keep a headache diary so you can figure out what triggers your headaches. Avoiding triggers may help you prevent headaches. Record when each headache began, how long it lasted, and what the pain was like. Use words like throbbing, aching, stabbing, or dull. Write down any other symptoms you had with the headache.  These may include nausea, flashing lights or dark spots, or sensitivity to bright light or loud noise. Note if the headache occurred near your period. List anything that might have triggered the headache. Triggers may include certain foods (chocolate, cheese, wine) or odors, smoke, bright light, stress, or lack of sleep. · If your doctor has prescribed medicine for your migraines, take it as directed. You may have medicine that you take only when you get a migraine and medicine that you take all the time to help prevent migraines. ¨ If your doctor has prescribed medicine for when you get a headache, take it at the first sign of a migraine, unless your doctor has given you other instructions. ¨ If your doctor has prescribed medicine to prevent migraines, take it exactly as prescribed. Call your doctor if you think you are having a problem with your medicine. · Find healthy ways to deal with stress. Migraines are most common during or right after stressful times. Take time to relax before and after you do something that has caused a migraine in the past.  · Try to keep your muscles relaxed by keeping good posture. Check your jaw, face, neck, and shoulder muscles for tension. Try to relax them. When sitting at a desk, change positions often. Stretch for 30 seconds each hour. · Get regular sleep and exercise. · Eat regular meals, and avoid foods and drinks that often trigger migraines. These include chocolate and alcohol, especially red wine and port. Chemicals used in food, such as aspartame and monosodium glutamate (MSG), also can trigger migraines. So can some food additives, such as those found in hot dogs, mora, cold cuts, aged cheeses, and pickled foods. · Limit caffeine by not drinking too much coffee, tea, or soda. Do not quit caffeine suddenly, because that can also give you migraines. · Do not smoke or allow others to smoke around you.  If you need help quitting, talk to your doctor about stop-smoking programs and medicines. These can increase your chances of quitting for good. · If you are taking birth control pills or hormone therapy, talk to your doctor about whether they are triggering your migraines. When should you call for help? Call 911 anytime you think you may need emergency care. For example, call if:  ? · You have symptoms of a stroke. These may include:  ¨ Sudden numbness, tingling, weakness, or loss of movement in your face, arm, or leg, especially on only one side of your body. ¨ Sudden vision changes. ¨ Sudden trouble speaking. ¨ Sudden confusion or trouble understanding simple statements. ¨ Sudden problems with walking or balance. ¨ A sudden, severe headache that is different from past headaches. ?Call your doctor now or seek immediate medical care if:  ? · You develop a fever and a stiff neck. ? · You have new nausea and vomiting, or you cannot keep down food or liquids. ? Watch closely for changes in your health, and be sure to contact your doctor if:  ? · You have a headache that does not get better within 1 or 2 days. ? · Your headaches get worse or happen more often. Where can you learn more? Go to http://vilma-francois.info/. Enter V975 in the search box to learn more about \"Recurring Migraine Headache: Care Instructions. \"  Current as of: October 14, 2016  Content Version: 11.4  © 0694-3008 [employer]. Care instructions adapted under license by [employer] (which disclaims liability or warranty for this information). If you have questions about a medical condition or this instruction, always ask your healthcare professional. [place] [address] any warranty or liability for your use of this information.

## 2018-01-30 NOTE — PROGRESS NOTES
Chief Complaint   Patient presents with    Follow-up     2 week f/u for migraines. Pt stated that she has been having migraines since last week. HPI:  The patient is a 32 y.o. female who presents today for a follow up appointment. No hospital, ER or specialist visits since last primary care visit except as noted below. She went to to [facility] but left without being seen d/t two hour weight for migraines. The patient returns for f/u after starting Imitrex. Initially she took an Imitrex with some relief. However she reports this has been ineffective and she called the on call physician for our practice and was advised to proceed to the ER. She went to [facility] but was not evaluated and treated because of wait times. She has had a migraine since last Thursday. Associated factors include N/V, dizziness, flushing, back of her neck hurts. She states her headache starts behind the right eye then radiates to the right base of her skull. It hurts to talk and cough. Nothing makes it better. She has tried Imitrex, Ibuprofen without relief. May 7th - Scheduled for sleep study evaluation (earliest available)    Review of Systems  A comprehensive review of systems was negative except for that written in the HPI.     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    Sleep disturbance G47.9       Past Medical History:   Diagnosis Date    Other ill-defined conditions(799.89)     kidney infections    Other ill-defined conditions(799.89)     Migraines    Ovarian cyst     Ruptured ovarian cyst 2018       Past Surgical History:   Procedure Laterality Date    HX ORTHOPAEDIC      pelvic bone fx       Social History   Substance Use Topics    Smoking status: Never Smoker    Smokeless tobacco: Never Used    Alcohol use No       Family History   Problem Relation Age of Onset    No Known Problems Mother     No Known Problems Father     No Known Problems Brother        Outpatient Prescriptions Marked as Taking for the 1/30/18 encounter (Office Visit) with Dinesh Glez NP   Medication Sig Dispense Refill    SUMAtriptan (IMITREX) 100 mg tablet Take 1 Tab by mouth once as needed for Migraine. May repeat x 1 in 2 hours prn, maximum 2 per day 12 Tab 0    fluticasone (FLONASE) 50 mcg/actuation nasal spray 2 Sprays by Both Nostrils route daily. Indications: Allergic Rhinitis 1 Bottle 2       Allergies   Allergen Reactions    Ciprofloxacin Hives    Pcn [Penicillins] Hives       PE:  Visit Vitals    /68 (BP 1 Location: Left arm, BP Patient Position: Sitting)    Pulse 73    Temp 96.6 °F (35.9 °C) (Oral)    Resp 18    Ht 5' 6\" (1.676 m)    Wt 190 lb 1.6 oz (86.2 kg)    SpO2 100%    BMI 30.68 kg/m2     Gen: alert, oriented, no moderate distress in dark room, limited exam d/t patient's discomfort level  Head: normocephalic, atraumatic  Neck: symmetric normal sized thyroid, no carotid bruits, no jugular vein distention  Resp: no increase work of breathing, lungs clear to ausculation bilaterally, no wheezing, rales or rhonchi  CV: S1, S2 normal.  No murmurs, rubs, or gallops. Extremities: no cyanosis or edema    Assessment/Plan:    ICD-10-CM ICD-9-CM    1. Intractable migraine without status migrainosus, unspecified migraine type G43.919 346.91 REFERRAL TO NEUROLOGY   2. Sleep disturbance G47.9 780.50 REFERRAL TO NEUROLOGY     Follow-up Disposition:  Return in about 4 weeks (around 2/27/2018) for Medication Check, Migraines. reviewed medications and side effects in detail  Pt advised to proceed to the ER for evaluation and treatment. Health Maintenance reviewed - reviewed, UTD. Recommended healthy diet low in carbohydrates, fats, sodium and cholesterol. Recommended regular cardiovascular exercise 3-6 times per week for 30-60 minutes daily. Chart is reviewed and updated today in the office.   Records requested for other providers patient has seen and is currently seeing. Patient was offered a choice/choices in the treatment plan today. Patient expresses understanding of the plan and agrees with recommendations. Verbal and written instructions (see AVS) provided. See patient instructions for more. Patient expresses understanding of diagnosis and treatment plan.

## 2018-01-30 NOTE — PROGRESS NOTES
Tamia Del Angel  Identified pt with two pt identifiers(name and ). Chief Complaint   Patient presents with    Follow-up     2 week f/u for migraines. Pt stated that she has been having migraines since last week. 1. Have you been to the ER, urgent care clinic since your last visit? Hospitalized since your last visit? YES, [facility] for a migraine on     2. Have you seen or consulted any other health care providers outside of the [address] [place] [address] since your last visit? Include any pap smears or colon screening. NO    Today's provider has been notified of reason for visit, vitals and flowsheets obtained on patients. Patient received paperwork for advance directive during previous visit but has not completed at this time     Reviewed record In preparation for visit, huddled with provider and have obtained necessary documentation      There are no preventive care reminders to display for this patient.     Wt Readings from Last 3 Encounters:   18 190 lb 1.6 oz (86.2 kg)   18 188 lb 7.9 oz (85.5 kg)   01/15/18 190 lb 14.4 oz (86.6 kg)     Temp Readings from Last 3 Encounters:   18 96.6 °F (35.9 °C) (Oral)   18 97.5 °F (36.4 °C)   01/15/18 95.9 °F (35.5 °C) (Oral)     BP Readings from Last 3 Encounters:   18 128/68   18 135/83   01/15/18 120/77     Pulse Readings from Last 3 Encounters:   18 73   18 85   01/15/18 84     Vitals:    18 0828   BP: 128/68   Pulse: 73   Resp: 18   Temp: 96.6 °F (35.9 °C)   TempSrc: Oral   SpO2: 100%   Weight: 190 lb 1.6 oz (86.2 kg)   Height: 5' 6\" (1.676 m)   PainSc:   7   PainLoc: Head         Learning Assessment:  :     Learning Assessment 1/15/2018   PRIMARY LEARNER Patient   HIGHEST LEVEL OF EDUCATION - PRIMARY LEARNER  2 YEARS OF COLLEGE   BARRIERS PRIMARY LEARNER NONE   CO-LEARNER CAREGIVER No   PRIMARY LANGUAGE ENGLISH   LEARNER PREFERENCE PRIMARY LISTENING   ANSWERED BY Patient   RELATIONSHIP SELF Depression Screening:  :     PHQ over the last two weeks 1/15/2018   Little interest or pleasure in doing things Not at all   Feeling down, depressed or hopeless Not at all   Total Score PHQ 2 0       Fall Risk Assessment:  :     Fall Risk Assessment, last 12 mths 1/15/2018   Able to walk? Yes   Fall in past 12 months? Yes   Fall with injury? Yes   Number of falls in past 12 months 1   Fall Risk Score 2       Abuse Screening:  :     Abuse Screening Questionnaire 1/15/2018   Do you ever feel afraid of your partner? N   Are you in a relationship with someone who physically or mentally threatens you? N   Is it safe for you to go home? Y       ADL Screening:  :     No flowsheet data found. Medication reconciliation up to date and corrected with patient at this time.

## 2018-02-05 ENCOUNTER — OFFICE VISIT (OUTPATIENT)
Dept: NEUROLOGY | Age: 28
End: 2018-02-05

## 2018-02-05 VITALS
BODY MASS INDEX: 30.18 KG/M2 | OXYGEN SATURATION: 99 % | SYSTOLIC BLOOD PRESSURE: 110 MMHG | WEIGHT: 187 LBS | DIASTOLIC BLOOD PRESSURE: 70 MMHG | HEART RATE: 93 BPM

## 2018-02-05 DIAGNOSIS — G43.011 INTRACTABLE MIGRAINE WITHOUT AURA AND WITH STATUS MIGRAINOSUS: Primary | ICD-10-CM

## 2018-02-05 RX ORDER — PREDNISONE 10 MG/1
TABLET ORAL
Qty: 42 TAB | Refills: 0 | Status: SHIPPED | OUTPATIENT
Start: 2018-02-05 | End: 2018-05-22 | Stop reason: ALTCHOICE

## 2018-02-05 RX ORDER — PROMETHAZINE HYDROCHLORIDE 12.5 MG/1
25 TABLET ORAL
Qty: 30 TAB | Refills: 2 | Status: SHIPPED | OUTPATIENT
Start: 2018-02-05 | End: 2018-05-22 | Stop reason: ALTCHOICE

## 2018-02-05 RX ORDER — TOPIRAMATE 25 MG/1
25 TABLET ORAL
Qty: 30 TAB | Refills: 5 | Status: SHIPPED | OUTPATIENT
Start: 2018-02-05 | End: 2018-05-22 | Stop reason: ALTCHOICE

## 2018-02-05 NOTE — PATIENT INSTRUCTIONS
[facility]   Statement to Patients  April 1, 2014      In an effort to ensure the large volume of patient prescription refills is processed in the most efficient and expeditious manner, we are asking our patients to assist us by calling your Pharmacy for all prescription refills, this will include also your  Mail Order Pharmacy. The pharmacy will contact our office electronically to continue the refill process. Please do not wait until the last minute to call your pharmacy. We need at least 48 hours (2days) to fill prescriptions. We also encourage you to call your pharmacy before going to  your prescription to make sure it is ready. With regard to controlled substance prescription refill requests (narcotic refills) that need to be picked up at our office, we ask your cooperation by providing us with at least 72 hours (3days) notice that you will need a refill. We will not refill narcotic prescription refill requests after 4:00pm on any weekday, Monday through Thursday, or after 2:00pm on Fridays, or on the weekends. We encourage everyone to explore another way of getting your prescription refill request processed using MediaSilo, our patient web portal through our electronic medical record system. MediaSilo is an efficient and effective way to communicate your medication request directly to the office and  downloadable as an tsering on your smart phone . MediaSilo also features a review functionality that allows you to view your medication list as well as leave messages for your physician. Are you ready to get connected? If so please review the attatched instructions or speak to any of our staff to get you set up right away! Thank you so much for your cooperation. Should you have any questions please contact our Practice Administrator.     The Physicians and Staff,  [facility]

## 2018-02-05 NOTE — MR AVS SNAPSHOT
[address] [facility] [address] [facility] [place] [phone] Patient: Mariola Lloyd MRN:  [id number] Visit Information Date & Time Provider Department Dept. Phone Encounter #  
 2/5/2018  3:00 PM Alie Garcia MD [facility] [phone] [id number] Follow-up Instructions Return in about 4 weeks (around 3/5/2018). Your Appointments 5/7/2018  3:00 PM  
New Patient with Kamari Cherry MD  
[address] [place] ([facility] Appt Note: NP_ ref by Dr Billy Henning_ migraines, snoring_ [facility] [address] [phone] [address] [place] [address]., [address] [phone] Upcoming Health Maintenance Date Due  
 PAP AKA CERVICAL CYTOLOGY 10/17/2020 DTaP/Tdap/Td series (2 - Td) 1/15/2028 Allergies as of 2/5/2018  Review Complete On: 2/5/2018 By: Alie Garcia MD  
  
 Severity Noted Reaction Type Reactions Ciprofloxacin  05/05/2010    Hives Pcn [Penicillins]  05/05/2010    Hives Current Immunizations  Never Reviewed No immunizations on file. Not reviewed this visit You Were Diagnosed With   
  
 Codes Comments Intractable migraine without aura and with status migrainosus    -  Primary ICD-10-CM: W10.110 
ICD-9-CM: 346.13 Vitals BP Pulse Weight(growth percentile) SpO2 BMI OB Status 110/70 93 187 lb (84.8 kg) 99% 30.18 kg/m2 Injection Smoking Status Never Smoker BMI and BSA Data Body Mass Index Body Surface Area  
 30.18 kg/m 2 1.99 m 2 Preferred Pharmacy Pharmacy Name Phone [facility] [address] [address] Sindy hDaliwal [phone] Your Updated Medication List  
  
   
This list is accurate as of: 2/5/18  3:48 PM.  Always use your most recent med list.  
  
  
 fluticasone 50 mcg/actuation nasal spray Commonly known as:  Sonal Sriram 2 Sprays by Both Nostrils route daily. Indications: Allergic Rhinitis  
  
 predniSONE 10 mg tablet Commonly known as:  Lissette Rinks Take 6 tabs for 2 days, 5 tabs for 2 days, 4 tabs for 2 days, 3 tabs for 2 days, 2 tabs for 2 days, then 1 tab for 2 days, then stop  
  
 promethazine 12.5 mg tablet Commonly known as:  PHENERGAN Take 2 Tabs by mouth every six (6) hours as needed for Nausea. SUMAtriptan 100 mg tablet Commonly known as:  IMITREX Take 1 Tab by mouth once as needed for Migraine. May repeat x 1 in 2 hours prn, maximum 2 per day  
  
 topiramate 25 mg tablet Commonly known as:  TOPAMAX Take 1 Tab by mouth nightly. Prescriptions Sent to Pharmacy Refills  
 topiramate (TOPAMAX) 25 mg tablet 5 Sig: Take 1 Tab by mouth nightly. Class: Normal  
 Pharmacy: [address] [place] [address] Ph #: [phone] Route: Oral  
 promethazine (PHENERGAN) 12.5 mg tablet 2 Sig: Take 2 Tabs by mouth every six (6) hours as needed for Nausea. Class: Normal  
 Pharmacy: [address] [place] [address] Ph #: [phone] Route: Oral  
 predniSONE (DELTASONE) 10 mg tablet 0 Sig: Take 6 tabs for 2 days, 5 tabs for 2 days, 4 tabs for 2 days, 3 tabs for 2 days, 2 tabs for 2 days, then 1 tab for 2 days, then stop Class: Normal  
 Pharmacy: [address] [place] [address] Ph #: [phone] Follow-up Instructions Return in about 4 weeks (around 3/5/2018). Patient Instructions PRESCRIPTION REFILL POLICY [facility] Statement to Patients April 1, 2014 In an effort to ensure the large volume of patient prescription refills is processed in the most efficient and expeditious manner, we are asking our patients to assist us by calling your Pharmacy for all prescription refills, this will include also your  Mail Order Pharmacy. The pharmacy will contact our office electronically to continue the refill process. Please do not wait until the last minute to call your pharmacy. We need at least 48 hours (2days) to fill prescriptions. We also encourage you to call your pharmacy before going to  your prescription to make sure it is ready. With regard to controlled substance prescription refill requests (narcotic refills) that need to be picked up at our office, we ask your cooperation by providing us with at least 72 hours (3days) notice that you will need a refill. We will not refill narcotic prescription refill requests after 4:00pm on any weekday, Monday through Thursday, or after 2:00pm on Fridays, or on the weekends. We encourage everyone to explore another way of getting your prescription refill request processed using Revuze, our patient web portal through our electronic medical record system. Revuze is an efficient and effective way to communicate your medication request directly to the office and  downloadable as an tsering on your smart phone . Revuze also features a review functionality that allows you to view your medication list as well as leave messages for your physician. Are you ready to get connected? If so please review the attatched instructions or speak to any of our staff to get you set up right away! Thank you so much for your cooperation. Should you have any questions please contact our Practice Administrator. The Physicians and Staff,  [facility] Introducing \[facility]\"" & [facility] SERVICES! Dear Guillermo Ellison: Thank you for requesting a Revuze account. Our records indicate that you already have an active Revuze account. You can access your account anytime at [email or web address] Did you know that you can access your hospital and ER discharge instructions at any time in [employer]? You can also review all of your test results from your hospital stay or ER visit. Additional Information If you have questions, please visit the Frequently Asked Questions section of the [employer] website at [email or web address] Remember, [employer] is NOT to be used for urgent needs. For medical emergencies, dial 911. Now available from your iPhone and Android! Please provide this summary of care documentation to your next provider. Your primary care clinician is listed as Dangelo Guadalupe. Julián Magallanes. If you have any questions after today's visit, please call [phone].

## 2018-02-05 NOTE — LETTER
NOTIFICATION OF RETURN TO WORK  
 
2/5/2018 [facility]. [facility]t [facility]man [address] [facility] [address] [facility] To Whom It May Concern: 
 
Jt Aleman was under the care of [facility]. She will return to work on 2/6/18. If there are questions or concerns please have the patient contact our office. Sincerely, Ho Juarez MD

## 2018-02-05 NOTE — LETTER
2/5/2018 3:49 PM 
 
Patient:  Easton Hickman YOB: 1990 Date of Visit: 2/5/2018 Dear Danny Carpenter, SALVADOR 
[address] [facility] [address] [facility] [address] VIA In Basket 
 : Thank you for referring Ms. Ed Frazier to me for evaluation/treatment. Below are the relevant portions of my assessment and plan of care. If you have questions, please do not hesitate to call me. I look forward to following Ms. Landen Mita along with you. Sincerely, Nicho Dean MD

## 2018-02-05 NOTE — PROGRESS NOTES
NEUROLOGY NEW PATIENT OFFICE CONSULTATION      2/5/2018    RE: Easton Hickman         1990      REFERRED BY:  Danny Carpenter NP        CHIEF COMPLAINT:  This is Easton Hickman is a 32 y.o. female left handed  for an [employer] who had concerns including Headache. HPI:     Since she was 13 yo, patient has headache, R periorbital and goes to the back of her head, aggravated by coughing and talking, pressure, 10/10, can last 4-5 days, occurring 3-4/ week, no triggers, (+) nausea (+) vomiting (+) photophobia (+) phonophobia (-) visual auras. Patient saw PCP and tried Imitrex and Exedrin with no benefit. Tylenol and Ibuprofen minimally helps. Being evaluated for sleep apnea and sleep study scheduled May 2017.       ROS  All other systems reviewed and are negative  (-) fever  (+) sinus allergies    Past Medical Hx  Past Medical History:   Diagnosis Date    Headache     Hypertension     Other ill-defined conditions(799.89)     kidney infections    Other ill-defined conditions(799.89)     Migraines    Ovarian cyst     Ruptured ovarian cyst 2018       Social Hx  Social History     Social History    Marital status: SINGLE     Spouse name: N/A    Number of children: N/A    Years of education: N/A     Social History Main Topics    Smoking status: Never Smoker    Smokeless tobacco: Never Used    Alcohol use No    Drug use: No    Sexual activity: Yes     Birth control/ protection: Injection     Other Topics Concern    None     Social History Narrative   1 daughter 7 yo    Family Hx  Family History   Problem Relation Age of Onset    No Known Problems Mother     No Known Problems Father     No Known Problems Brother    Maternal aunt - migraines    ALLERGIES  Allergies   Allergen Reactions    Ciprofloxacin Hives    Pcn [Penicillins] Hives       CURRENT MEDS  Current Outpatient Prescriptions   Medication Sig Dispense Refill    topiramate (TOPAMAX) 25 mg tablet Take 1 Tab by mouth nightly. 30 Tab 5    promethazine (PHENERGAN) 12.5 mg tablet Take 2 Tabs by mouth every six (6) hours as needed for Nausea. 30 Tab 2    predniSONE (DELTASONE) 10 mg tablet Take 6 tabs for 2 days, 5 tabs for 2 days, 4 tabs for 2 days, 3 tabs for 2 days, 2 tabs for 2 days, then 1 tab for 2 days, then stop 42 Tab 0    fluticasone (FLONASE) 50 mcg/actuation nasal spray 2 Sprays by Both Nostrils route daily. Indications: Allergic Rhinitis 1 Bottle 2    SUMAtriptan (IMITREX) 100 mg tablet Take 1 Tab by mouth once as needed for Migraine. May repeat x 1 in 2 hours prn, maximum 2 per day 12 Tab 0           PREVIOUS WORKUP: (reviewed)  IMAGING:    CT Results (recent):    Results from Hospital Encounter encounter on 12/20/17   CT ABD PELV W CONT   Narrative EXAM:  CT ABD PELV W CONT    INDICATION: RLQ/RUQ pain today    COMPARISON: 1/28/2015    CONTRAST:  100 mL of Isovue-370. TECHNIQUE:   Following the uneventful intravenous administration of contrast, thin axial  images were obtained through the abdomen and pelvis. Coronal and sagittal  reconstructions were generated. Oral contrast was administered. CT dose  reduction was achieved through use of a standardized protocol tailored for this  examination and automatic exposure control for dose modulation. FINDINGS:   LUNG BASES: Clear. INCIDENTALLY IMAGED HEART AND MEDIASTINUM: Unremarkable. LIVER: No mass or biliary dilatation. GALLBLADDER: Unremarkable. SPLEEN: No mass. PANCREAS: No mass or ductal dilatation. ADRENALS: Unremarkable. KIDNEYS: No mass, calculus, or hydronephrosis. STOMACH: Unremarkable. SMALL BOWEL: No dilatation or wall thickening. COLON: No dilatation or wall thickening. Small pericecal lymph nodes are  unchanged. APPENDIX: Unremarkable. PERITONEUM: No ascites or pneumoperitoneum. RETROPERITONEUM: No lymphadenopathy or aortic aneurysm. REPRODUCTIVE ORGANS: There is no pelvic mass or lymphadenopathy.   URINARY BLADDER: No mass or calculus. BONES: No destructive bone lesion. ADDITIONAL COMMENTS: N/A         Impression IMPRESSION:  No acute abnormality. MRI Results (recent):  No results found for this or any previous visit. IR Results (recent):  No results found for this or any previous visit. VAS/US Results (recent):  No results found for this or any previous visit. LABS (reviewed)  Results for orders placed or performed during the hospital encounter of 12/20/17   WET PREP   Result Value Ref Range    Clue cells CLUE CELLS ABSENT      Wet prep NO TRICHOMONAS SEEN     JARETT, OTHER SOURCES   Result Value Ref Range    Special Requests: NO SPECIAL REQUESTS      KOH NO YEAST SEEN     URINALYSIS W/ RFLX MICROSCOPIC   Result Value Ref Range    Color YELLOW/STRAW      Appearance CLOUDY (A) CLEAR      Specific gravity 1.014 1.003 - 1.030      pH (UA) 6.0 5.0 - 8.0      Protein NEGATIVE  NEG mg/dL    Glucose NEGATIVE  NEG mg/dL    Ketone NEGATIVE  NEG mg/dL    Bilirubin NEGATIVE  NEG      Blood NEGATIVE  NEG      Urobilinogen 0.2 0.2 - 1.0 EU/dL    Nitrites NEGATIVE  NEG      Leukocyte Esterase TRACE (A) NEG      WBC 5-10 0 - 4 /hpf    RBC 0-5 0 - 5 /hpf    Epithelial cells MODERATE (A) FEW /lpf    Bacteria 2+ (A) NEG /hpf    Hyaline cast 0-2 0 - 5 /lpf   HCG URINE, QL   Result Value Ref Range    HCG urine, QL NEGATIVE  NEG     CBC WITH AUTOMATED DIFF   Result Value Ref Range    WBC 8.4 3.6 - 11.0 K/uL    RBC 4.28 3.80 - 5.20 M/uL    HGB 12.7 11.5 - 16.0 g/dL    HCT 38.5 35.0 - 47.0 %    MCV 90.0 80.0 - 99.0 FL    MCH 29.7 26.0 - 34.0 PG    MCHC 33.0 30.0 - 36.5 g/dL    RDW 13.2 11.5 - 14.5 %    PLATELET 781 557 - 123 K/uL    NEUTROPHILS 73 32 - 75 %    LYMPHOCYTES 18 12 - 49 %    MONOCYTES 6 5 - 13 %    EOSINOPHILS 3 0 - 7 %    BASOPHILS 0 0 - 1 %    ABS. NEUTROPHILS 6.1 1.8 - 8.0 K/UL    ABS. LYMPHOCYTES 1.5 0.8 - 3.5 K/UL    ABS. MONOCYTES 0.5 0.0 - 1.0 K/UL    ABS. EOSINOPHILS 0.3 0.0 - 0.4 K/UL    ABS.  BASOPHILS 0.0 0.0 - 0.1 K/UL   METABOLIC PANEL, COMPREHENSIVE   Result Value Ref Range    Sodium 140 136 - 145 mmol/L    Potassium 3.8 3.5 - 5.1 mmol/L    Chloride 110 (H) 97 - 108 mmol/L    CO2 23 21 - 32 mmol/L    Anion gap 7 5 - 15 mmol/L    Glucose 83 65 - 100 mg/dL    BUN 10 6 - 20 MG/DL    Creatinine 0.80 0.55 - 1.02 MG/DL    BUN/Creatinine ratio 13 12 - 20      GFR est AA >60 >60 ml/min/1.73m2    GFR est non-AA >60 >60 ml/min/1.73m2    Calcium 8.2 (L) 8.5 - 10.1 MG/DL    Bilirubin, total 0.2 0.2 - 1.0 MG/DL    ALT (SGPT) 19 12 - 78 U/L    AST (SGOT) 16 15 - 37 U/L    Alk. phosphatase 42 (L) 45 - 117 U/L    Protein, total 7.0 6.4 - 8.2 g/dL    Albumin 3.3 (L) 3.5 - 5.0 g/dL    Globulin 3.7 2.0 - 4.0 g/dL    A-G Ratio 0.9 (L) 1.1 - 2.2     LACTIC ACID   Result Value Ref Range    Lactic acid 1.6 0.4 - 2.0 MMOL/L   LIPASE   Result Value Ref Range    Lipase 133 73 - 393 U/L   POTASSIUM   Result Value Ref Range    Potassium 3.8 3.5 - 5.1 mmol/L   MAGNESIUM   Result Value Ref Range    Magnesium 2.1 1.6 - 2.4 mg/dL   CHLAMYDIA/GC PCR   Result Value Ref Range    Sample type SWAB      Source VAGINA      Chlamydia amplified NEGATIVE  NEG      N. gonorrhea, amplified NEGATIVE  NEG      Comment        Testing performed by the [employer] [place] CT/NG method, utilizing PCR amplification to identify DNA of the pathogens. This method is not recommended as the sole method of evaluation of cases of sexual abuse nor for other medico-legal indications. Physical Exam:     Visit Vitals    /70    Pulse 93    Wt 84.8 kg (187 lb)    SpO2 99%    BMI 30.18 kg/m2     General:  Alert, cooperative, no distress. Head:  Normocephalic, without obvious abnormality, atraumatic. Eyes:  Conjunctivae/corneas clear. Lungs:  Heart:   Non labored breathing  Regular rate and rhythm, no carotid bruits   Abdomen:   Soft, non-distended   Extremities: Extremities normal, atraumatic, no cyanosis or edema. Pulses: 2+ and symmetric all extremities. Skin: Skin color, texture, turgor normal. No rashes or lesions. Neurologic Exam     Gen: Attention normal             Language: naming, repetition, fluency normal             Memory: intact recent and remote memory  Cranial Nerves:  I: smell Not tested   II: visual fields Full to confrontation   II: pupils Equal, round, reactive to light   II: optic disc No papilledema   III,VII: ptosis none   III,IV,VI: extraocular muscles  Full ROM   V: mastication normal   V: facial light touch sensation  normal   VII: facial muscle function   symmetric   VIII: hearing symmetric   IX: soft palate elevation  normal   XI: trapezius strength  5/5   XI: sternocleidomastoid strength 5/5   XI: neck flexion strength  5/5   XII: tongue  midline     Motor: normal bulk and tone, no tremor              Strength: 5/5 all four extremities  Sensory: intact to LT, PP, vibration, and JPS  Reflexes: 2+ throughout; Down going toes  Coordination: Good FTN and HTS  Gait: normal gait including tandem            Impression:     Cj Miranda is a 32 y.o. female who has history of Ovarian cyst; and Ruptured ovarian cyst (2018) and sinus allergies who since she was 13 yo, has been suffering from severe headache, R periorbital and goes to the back of her head, aggravated by coughing and talking, pressure, 10/10, can last 4-5 days, occurring 3-4/ week, no triggers, associated with nausea, vomiting,  Photophobia and phonophobia consistent with migraine without visual auras, with status migrainosus, intractable. Patient saw PCP and tried Imitrex and Exedrin with no benefit. Tylenol and Ibuprofen minimally helped. Patient also is being evaluated for sleep apnea . RECOMMENDATIONS  1. Trial of Topamax 25 mg QHS as migraine prophylaxis  2. Prednisone taper to break headache cycle  3. Given samples of Cambia and Zipsor to try to abort headaches  4.  Discussed the need for headache diary and went through the list that can trigger headache (caffeine and gluten)  5. Will do MRI brain if no improvement despite above  6. Patient is scheduled for  sleep study on May 2017 to evaluate for MARCOS      Follow-up Disposition:  Return in about 4 weeks (around 3/5/2018).       Thank you for the consultation      Roman Glez MD  Diplomate, American Board of Psychiatry and Neurology  Diplomate, Neuromuscular Medicine  Diplomate, American Board of Electrodiagnostic Medicine        CC: Kavon Arvizu NP  Fax: [phone]

## 2018-02-20 RX ORDER — TOPIRAMATE 50 MG/1
50 TABLET, FILM COATED ORAL DAILY
Qty: 30 TAB | Refills: 3 | Status: SHIPPED | OUTPATIENT
Start: 2018-02-20 | End: 2018-07-10 | Stop reason: SDUPTHER

## 2018-02-20 RX ORDER — RIZATRIPTAN BENZOATE 10 MG/1
10 TABLET ORAL
Qty: 9 TAB | Refills: 3 | Status: SHIPPED | OUTPATIENT
Start: 2018-02-20 | End: 2018-10-22 | Stop reason: SDUPTHER

## 2018-02-20 NOTE — TELEPHONE ENCOUNTER
Order placed for Topamax 50 mg at bedtime, # 30 tabs, PO, per Verbal Order from Dr. Benny Pappas on 2/20/2018 due to headache. Order placed for Maxalt 10 mg, # 9 tabs, PO, per Verbal Order from Dr. Benny Pappas on 2/20/2018 due to headache.

## 2018-03-05 ENCOUNTER — OFFICE VISIT (OUTPATIENT)
Dept: NEUROLOGY | Age: 28
End: 2018-03-05

## 2018-03-05 VITALS
HEART RATE: 78 BPM | SYSTOLIC BLOOD PRESSURE: 110 MMHG | BODY MASS INDEX: 31.17 KG/M2 | WEIGHT: 193.1 LBS | DIASTOLIC BLOOD PRESSURE: 74 MMHG | OXYGEN SATURATION: 98 %

## 2018-03-05 DIAGNOSIS — G43.011 INTRACTABLE MIGRAINE WITHOUT AURA AND WITH STATUS MIGRAINOSUS: Primary | ICD-10-CM

## 2018-03-05 NOTE — PROGRESS NOTES
Neurology Progress Note    Patient ID:  Mariola Lloyd  [id number]  31 y.o.  1990      Subjective:   History:  Mariola Lloyd is a 32 y.o. female who has history of Ovarian cyst; and Ruptured ovarian cyst (2018) and sinus allergies who since she was 13 yo, has been suffering from severe headache, R periorbital and goes to the back of her head, aggravated by coughing and talking, pressure, 10/10, can last 4-5 days, occurring 3-4/ week, no triggers, associated with nausea, vomiting,  Photophobia and phonophobia consistent with migraine without visual auras, with status migrainosus, intractable. Patient saw PCP and tried Imitrex and Exedrin with no benefit. Tylenol and Ibuprofen minimally helped. Patient also is being evaluated for sleep apnea . Since the last time, Topamax was increased to 50 mg QHS with improvement of sleep and decrease headache to 1/ week. Cambia and Arlester Macho did not help. Maxalt works. Also has stopped drinking [facility].     ROS:  Per HPI-  Otherwise the remainder of ROS was negative    Social Hx  Social History     Social History    Marital status: SINGLE     Spouse name: N/A    Number of children: N/A    Years of education: N/A     Social History Main Topics    Smoking status: Never Smoker    Smokeless tobacco: Never Used    Alcohol use No    Drug use: No    Sexual activity: Yes     Birth control/ protection: Injection     Other Topics Concern    None     Social History Narrative       Meds:  Current Outpatient Prescriptions on File Prior to Visit   Medication Sig Dispense Refill    topiramate (TOPAMAX) 50 mg tablet Take 1 Tab by mouth daily. 30 Tab 3    rizatriptan (MAXALT) 10 mg tablet Take 1 Tab by mouth once as needed for Migraine for up to 1 dose. May repeat in 2 hours if needed 9 Tab 3    promethazine (PHENERGAN) 12.5 mg tablet Take 2 Tabs by mouth every six (6) hours as needed for Nausea.  30 Tab 2    SUMAtriptan (IMITREX) 100 mg tablet Take 1 Tab by mouth once as needed for Migraine. May repeat x 1 in 2 hours prn, maximum 2 per day 12 Tab 0    fluticasone (FLONASE) 50 mcg/actuation nasal spray 2 Sprays by Both Nostrils route daily. Indications: Allergic Rhinitis 1 Bottle 2    topiramate (TOPAMAX) 25 mg tablet Take 1 Tab by mouth nightly. 30 Tab 5    predniSONE (DELTASONE) 10 mg tablet Take 6 tabs for 2 days, 5 tabs for 2 days, 4 tabs for 2 days, 3 tabs for 2 days, 2 tabs for 2 days, then 1 tab for 2 days, then stop 42 Tab 0     No current facility-administered medications on file prior to visit. Imaging:    CT Results (recent):    Results from Hospital Encounter encounter on 12/20/17   CT ABD PELV W CONT   Narrative EXAM:  CT ABD PELV W CONT    INDICATION: RLQ/RUQ pain today    COMPARISON: 1/28/2015    CONTRAST:  100 mL of Isovue-370. TECHNIQUE:   Following the uneventful intravenous administration of contrast, thin axial  images were obtained through the abdomen and pelvis. Coronal and sagittal  reconstructions were generated. Oral contrast was administered. CT dose  reduction was achieved through use of a standardized protocol tailored for this  examination and automatic exposure control for dose modulation. FINDINGS:   LUNG BASES: Clear. INCIDENTALLY IMAGED HEART AND MEDIASTINUM: Unremarkable. LIVER: No mass or biliary dilatation. GALLBLADDER: Unremarkable. SPLEEN: No mass. PANCREAS: No mass or ductal dilatation. ADRENALS: Unremarkable. KIDNEYS: No mass, calculus, or hydronephrosis. STOMACH: Unremarkable. SMALL BOWEL: No dilatation or wall thickening. COLON: No dilatation or wall thickening. Small pericecal lymph nodes are  unchanged. APPENDIX: Unremarkable. PERITONEUM: No ascites or pneumoperitoneum. RETROPERITONEUM: No lymphadenopathy or aortic aneurysm. REPRODUCTIVE ORGANS: There is no pelvic mass or lymphadenopathy. URINARY BLADDER: No mass or calculus. BONES: No destructive bone lesion.   ADDITIONAL COMMENTS: N/A         Impression IMPRESSION:  No acute abnormality. MRI Results (recent):  No results found for this or any previous visit. IR Results (recent):  No results found for this or any previous visit. VAS/US Results (recent):  No results found for this or any previous visit. Reviewed records in [facility] and [employer] tab today    Lab Review     Admission on 12/20/2017, Discharged on 12/20/2017   Component Date Value Ref Range Status    Color 12/20/2017 YELLOW/STRAW    Final    Color Reference Range: Straw, Yellow or Dark Yellow    Appearance 12/20/2017 CLOUDY* CLEAR   Final    Specific gravity 12/20/2017 1.014  1.003 - 1.030   Final    pH (UA) 12/20/2017 6.0  5.0 - 8.0   Final    Protein 12/20/2017 NEGATIVE   NEG mg/dL Final    Glucose 12/20/2017 NEGATIVE   NEG mg/dL Final    Ketone 12/20/2017 NEGATIVE   NEG mg/dL Final    Bilirubin 12/20/2017 NEGATIVE   NEG   Final    Blood 12/20/2017 NEGATIVE   NEG   Final    Urobilinogen 12/20/2017 0.2  0.2 - 1.0 EU/dL Final    Nitrites 12/20/2017 NEGATIVE   NEG   Final    Leukocyte Esterase 12/20/2017 TRACE* NEG   Final    WBC 12/20/2017 5-10  0 - 4 /hpf Final    RBC 12/20/2017 0-5  0 - 5 /hpf Final    Epithelial cells 12/20/2017 MODERATE* FEW /lpf Final    Epithelial cell category consists of squamous cells and /or transitional urothelial cells. Renal tubular cells, if present, are separately identified as such.     Bacteria 12/20/2017 2+* NEG /hpf Final    Hyaline cast 12/20/2017 0-2  0 - 5 /lpf Final    HCG urine, QL 12/20/2017 NEGATIVE   NEG   Final    WBC 12/20/2017 8.4  3.6 - 11.0 K/uL Final    RBC 12/20/2017 4.28  3.80 - 5.20 M/uL Final    HGB 12/20/2017 12.7  11.5 - 16.0 g/dL Final    HCT 12/20/2017 38.5  35.0 - 47.0 % Final    MCV 12/20/2017 90.0  80.0 - 99.0 FL Final    MCH 12/20/2017 29.7  26.0 - 34.0 PG Final    MCHC 12/20/2017 33.0  30.0 - 36.5 g/dL Final    RDW 12/20/2017 13.2  11.5 - 14.5 % Final    PLATELET 00/88/8619 692  150 - 400 K/uL Final  NEUTROPHILS 12/20/2017 73  32 - 75 % Final    LYMPHOCYTES 12/20/2017 18  12 - 49 % Final    MONOCYTES 12/20/2017 6  5 - 13 % Final    EOSINOPHILS 12/20/2017 3  0 - 7 % Final    BASOPHILS 12/20/2017 0  0 - 1 % Final    ABS. NEUTROPHILS 12/20/2017 6.1  1.8 - 8.0 K/UL Final    ABS. LYMPHOCYTES 12/20/2017 1.5  0.8 - 3.5 K/UL Final    ABS. MONOCYTES 12/20/2017 0.5  0.0 - 1.0 K/UL Final    ABS. EOSINOPHILS 12/20/2017 0.3  0.0 - 0.4 K/UL Final    ABS. BASOPHILS 12/20/2017 0.0  0.0 - 0.1 K/UL Final    Sodium 12/20/2017 140  136 - 145 mmol/L Final    Potassium 12/20/2017 3.8  3.5 - 5.1 mmol/L Final    SPECIMEN HEMOLYZED, RESULTS MAY BE AFFECTED    Chloride 12/20/2017 110* 97 - 108 mmol/L Final    CO2 12/20/2017 23  21 - 32 mmol/L Final    Anion gap 12/20/2017 7  5 - 15 mmol/L Final    Glucose 12/20/2017 83  65 - 100 mg/dL Final    BUN 12/20/2017 10  6 - 20 MG/DL Final    Creatinine 12/20/2017 0.80  0.55 - 1.02 MG/DL Final    BUN/Creatinine ratio 12/20/2017 13  12 - 20   Final    GFR est AA 12/20/2017 >60  >60 ml/min/1.73m2 Final    GFR est non-AA 12/20/2017 >60  >60 ml/min/1.73m2 Final    Comment: Estimated GFR is calculated using the IDMS-traceable Modification of Diet in Renal Disease (MDRD) Study equation, reported for both  Americans (GFRAA) and non- Americans (GFRNA), and normalized to 1.73m2 body surface area. The physician must decide which value applies to the patient. The MDRD study equation should only be used in individuals age 25 or older. It has not been validated for the following: pregnant women, patients with serious comorbid conditions, or on certain medications, or persons with extremes of body size, muscle mass, or nutritional status.       Calcium 12/20/2017 8.2* 8.5 - 10.1 MG/DL Final    Bilirubin, total 12/20/2017 0.2  0.2 - 1.0 MG/DL Final    ALT (SGPT) 12/20/2017 19  12 - 78 U/L Final    AST (SGOT) 12/20/2017 16  15 - 37 U/L Final    SPECIMEN HEMOLYZED, RESULTS MAY BE AFFECTED    Alk. phosphatase 12/20/2017 42* 45 - 117 U/L Final    Protein, total 12/20/2017 7.0  6.4 - 8.2 g/dL Final    Albumin 12/20/2017 3.3* 3.5 - 5.0 g/dL Final    Globulin 12/20/2017 3.7  2.0 - 4.0 g/dL Final    A-G Ratio 12/20/2017 0.9* 1.1 - 2.2   Final    Lactic acid 12/20/2017 1.6  0.4 - 2.0 MMOL/L Final    Lipase 12/20/2017 133  73 - 393 U/L Final    Potassium 12/20/2017 3.8  3.5 - 5.1 mmol/L Final    Magnesium 12/20/2017 2.1  1.6 - 2.4 mg/dL Final    Sample type 12/20/2017 SWAB    Final    Source 12/20/2017 VAGINA    Final    Chlamydia amplified 12/20/2017 NEGATIVE   NEG   Final    N. gonorrhea, amplified 12/20/2017 NEGATIVE   NEG   Final    Comment 12/20/2017 Testing performed by the [employer] [place] CT/NG method, utilizing PCR amplification to identify DNA of the pathogens. This method is not recommended as the sole method of evaluation of cases of sexual abuse nor for other medico-legal indications. Final    Cultures are recommended in these cases. As with all laboratory tests, these results should be interpreted in conjunction with the patient's clinical presentation.  Clue cells 12/20/2017 CLUE CELLS ABSENT    Final    Wet prep 12/20/2017 NO TRICHOMONAS SEEN    Final    NO YEAST SEEN    Special Requests: 12/20/2017 NO SPECIAL REQUESTS    Final    KOH 12/20/2017 NO YEAST SEEN    Final         Objective:       Exam:  Visit Vitals    /74    Pulse 78    Wt 87.6 kg (193 lb 1.6 oz)    SpO2 98%    BMI 31.17 kg/m2     Gen: Awake, alert, follows commands  Appropriate appearance, normal speech. Oriented to all spheres. No visual field defect on confrontation exam.  Full eyes movement, with no nystagmus, no diplopia, no ptosis. Normal gag and swallow. All remaining cranial nerves were normal  Motor function: 5/5 in all extremities  Sensory: intact to LT, PP and JPS  Good FTN and HTS   Gait: Normal    Assessment:       ICD-10-CM ICD-9-CM    1.  Intractable migraine without aura and with status migrainosus G43.011 346.13            Plan:   1. Continue Topamax 50 mg QHS as migraine prophylaxis  2. Continue Maxalt 10 mg prn to abort headaches  3. Continue headache diary and the list that can trigger headache [facility], caffeine and gluten)  4. Patient is scheduled for  sleep study on May 2017 to evaluate for MARCOS        Follow-up Disposition:  Return in about 3 months (around 6/5/2018).           Theresa Morales MD  Diplomate, American Board of Psychiatry and Neurology  Diplomate, Neuromuscular Medicine  Diplomate, American Board of Electrodiagnostic Medicine

## 2018-03-05 NOTE — MR AVS SNAPSHOT
[facility] 
 
 
 [address] [place] [address] [facility] [place] [id number] [phone] Patient: Pj Hill MRN:  [id number] Visit Information Date & Time Provider Department Dept. Phone Encounter #  
 3/5/2018  3:40 PM Sergo Robles MD [facility] [phone] [id number] Follow-up Instructions Return in about 3 months (around 6/5/2018). Your Appointments 5/7/2018  3:00 PM  
New Patient with John Taylor MD  
[id number] [facility] ([facility]) Appt Note: NP_ ref by Dr Argenis Henning_ migraines, snoring_ [facility] [address] [phone] [address] [place] [address]., [address] [phone] Upcoming Health Maintenance Date Due  
 PAP AKA CERVICAL CYTOLOGY 10/17/2020 DTaP/Tdap/Td series (2 - Td) 1/15/2028 Allergies as of 3/5/2018  Review Complete On: 3/5/2018 By: Treva Rees LPN Severity Noted Reaction Type Reactions Ciprofloxacin  05/05/2010    Hives Pcn [Penicillins]  05/05/2010    Hives Current Immunizations  Never Reviewed No immunizations on file. Not reviewed this visit You Were Diagnosed With   
  
 Codes Comments Intractable migraine without aura and with status migrainosus    -  Primary ICD-10-CM: E69.371 
ICD-9-CM: 346.13 Vitals BP Pulse Weight(growth percentile) SpO2 BMI OB Status 110/74 78 193 lb 1.6 oz (87.6 kg) 98% 31.17 kg/m2 Injection Smoking Status Never Smoker BMI and BSA Data Body Mass Index Body Surface Area  
 31.17 kg/m 2 2.02 m 2 Preferred Pharmacy Pharmacy Name Phone [facility] [address] [place] [address] Delio Sun [phone] Your Updated Medication List  
  
   
This list is accurate as of 3/5/18  4:16 PM.  Always use your most recent med list.  
  
  
  
  
 fluticasone 50 mcg/actuation nasal spray Commonly known as:  Jeppie Newberry 2 Sprays by Both Nostrils route daily. Indications: Allergic Rhinitis  
  
 predniSONE 10 mg tablet Commonly known as:  Pratima Garden Take 6 tabs for 2 days, 5 tabs for 2 days, 4 tabs for 2 days, 3 tabs for 2 days, 2 tabs for 2 days, then 1 tab for 2 days, then stop  
  
 promethazine 12.5 mg tablet Commonly known as:  PHENERGAN Take 2 Tabs by mouth every six (6) hours as needed for Nausea. rizatriptan 10 mg tablet Commonly known as:  Forrestine Battles Take 1 Tab by mouth once as needed for Migraine for up to 1 dose. May repeat in 2 hours if needed SUMAtriptan 100 mg tablet Commonly known as:  IMITREX Take 1 Tab by mouth once as needed for Migraine. May repeat x 1 in 2 hours prn, maximum 2 per day * topiramate 25 mg tablet Commonly known as:  TOPAMAX Take 1 Tab by mouth nightly. * topiramate 50 mg tablet Commonly known as:  TOPAMAX Take 1 Tab by mouth daily. * Notice: This list has 2 medication(s) that are the same as other medications prescribed for you. Read the directions carefully, and ask your doctor or other care provider to review them with you. Follow-up Instructions Return in about 3 months (around 6/5/2018). Patient Instructions PRESCRIPTION REFILL POLICY [facility] Statement to Patients April 1, 2014 In an effort to ensure the large volume of patient prescription refills is processed in the most efficient and expeditious manner, we are asking our patients to assist us by calling your Pharmacy for all prescription refills, this will include also your  Mail Order Pharmacy. The pharmacy will contact our office electronically to continue the refill process. Please do not wait until the last minute to call your pharmacy. We need at least 48 hours (2days) to fill prescriptions.  We also encourage you to call your pharmacy before going to  your prescription to make sure it is ready. With regard to controlled substance prescription refill requests (narcotic refills) that need to be picked up at our office, we ask your cooperation by providing us with at least 72 hours (3days) notice that you will need a refill. We will not refill narcotic prescription refill requests after 4:00pm on any weekday, Monday through Thursday, or after 2:00pm on Fridays, or on the weekends. We encourage everyone to explore another way of getting your prescription refill request processed using "[employer]", our patient web portal through our electronic medical record system. "[employer]" is an efficient and effective way to communicate your medication request directly to the office and  downloadable as an tsering on your smart phone . "[employer]" also features a review functionality that allows you to view your medication list as well as leave messages for your physician. Are you ready to get connected? If so please review the attatched instructions or speak to any of our staff to get you set up right away! Thank you so much for your cooperation. Should you have any questions please contact our Practice Administrator. The Physicians and Staff,  [facility] Migraine Headache: Care Instructions Your Care Instructions Migraines are painful, throbbing headaches that often start on one side of the head. They may cause nausea and vomiting and make you sensitive to light, sound, or smell. Without treatment, migraines can last from 4 hours to a few days. Medicines can help prevent migraines or stop them after they have started. Your doctor can help you find which ones work best for you. Follow-up care is a key part of your treatment and safety. Be sure to make and go to all appointments, and call your doctor if you are having problems.  It's also a good idea to know your test results and keep a list of the medicines you take. How can you care for yourself at home? · Do not drive if you have taken a prescription pain medicine. · Rest in a quiet, dark room until your headache is gone. Close your eyes, and try to relax or go to sleep. Don't watch TV or read. · Put a cold, moist cloth or cold pack on the painful area for 10 to 20 minutes at a time. Put a thin cloth between the cold pack and your skin. · Use a warm, moist towel or a heating pad set on low to relax tight shoulder and neck muscles. · Have someone gently massage your neck and shoulders. · Take your medicines exactly as prescribed. Call your doctor if you think you are having a problem with your medicine. You will get more details on the specific medicines your doctor prescribes. · Be careful not to take pain medicine more often than the instructions allow. You could get worse or more frequent headaches when the medicine wears off. To prevent migraines · Keep a headache diary so you can figure out what triggers your headaches. Avoiding triggers may help you prevent headaches. Record when each headache began, how long it lasted, and what the pain was like. (Was it throbbing, aching, stabbing, or dull?) Write down any other symptoms you had with the headache, such as nausea, flashing lights or dark spots, or sensitivity to bright light or loud noise. Note if the headache occurred near your period. List anything that might have triggered the headache. Triggers may include certain foods (chocolate, cheese, wine) or odors, smoke, bright light, stress, or lack of sleep. · If your doctor has prescribed medicine for your migraines, take it as directed. You may have medicine that you take only when you get a migraine and medicine that you take all the time to help prevent migraines. ¨ If your doctor has prescribed medicine for when you get a headache, take it at the first sign of a migraine, unless your doctor has given you other instructions. ¨ If your doctor has prescribed medicine to prevent migraines, take it exactly as prescribed. Call your doctor if you think you are having a problem with your medicine. · Find healthy ways to deal with stress. Migraines are most common during or right after stressful times. Take time to relax before and after you do something that has caused a migraine in the past. 
· Try to keep your muscles relaxed by keeping good posture. Check your jaw, face, neck, and shoulder muscles for tension. Try to relax them. When you sit at a desk, change positions often. And make sure to stretch for 30 seconds each hour. · Get plenty of sleep and exercise. · Eat meals on a regular schedule. Avoid foods and drinks that often trigger migraines. These include chocolate, alcohol (especially red wine and port), aspartame, monosodium glutamate (MSG), and some additives found in foods (such as hot dogs, mora, cold cuts, aged cheeses, and pickled foods). · Limit caffeine. Don't drink too much coffee, tea, or soda. But don't quit caffeine suddenly. That can also give you migraines. · Do not smoke or allow others to smoke around you. If you need help quitting, talk to your doctor about stop-smoking programs and medicines. These can increase your chances of quitting for good. · If you are taking birth control pills or hormone therapy, talk to your doctor about whether they are triggering your migraines. When should you call for help? Call 911 anytime you think you may need emergency care. For example, call if: 
? · You have signs of a stroke. These may include: 
¨ Sudden numbness, paralysis, or weakness in your face, arm, or leg, especially on only one side of your body. ¨ Sudden vision changes. ¨ Sudden trouble speaking. ¨ Sudden confusion or trouble understanding simple statements. ¨ Sudden problems with walking or balance. ¨ A sudden, severe headache that is different from past headaches. ?Call your doctor now or seek immediate medical care if: 
? · You have new or worse nausea and vomiting. ? · You have a new or higher fever. ? · Your headache gets much worse. ? Watch closely for changes in your health, and be sure to contact your doctor if: 
? · You are not getting better after 2 days (48 hours). Where can you learn more? Go to http://vilma-francois.info/. Enter Q685 in the search box to learn more about \"Migraine Headache: Care Instructions. \" Current as of: October 14, 2016 Content Version: 11.4 © 2237-1837 [employer]. Care instructions adapted under license by [employer] (which disclaims liability or warranty for this information). If you have questions about a medical condition or this instruction, always ask your healthcare professional. [address] any warranty or liability for your use of this information. Introducing [facility]! Dear Thomas Topete: Thank you for requesting a [employer] account. Our records indicate that you already have an active [employer] account. You can access your account anytime at [email or web address] Did you know that you can access your hospital and ER discharge instructions at any time in [employer]? You can also review all of your test results from your hospital stay or ER visit. Additional Information If you have questions, please visit the Frequently Asked Questions section of the [employer] website at [email or web address] Remember, [employer] is NOT to be used for urgent needs. For medical emergencies, dial 911. Now available from your iPhone and Android! Please provide this summary of care documentation to your next provider. Your primary care clinician is listed as Mina Wilson. [place]. If you have any questions after today's visit, please call [phone].

## 2018-03-05 NOTE — PATIENT INSTRUCTIONS
[facility]   Statement to Patients  April 1, 2014      In an effort to ensure the large volume of patient prescription refills is processed in the most efficient and expeditious manner, we are asking our patients to assist us by calling your Pharmacy for all prescription refills, this will include also your  Mail Order Pharmacy. The pharmacy will contact our office electronically to continue the refill process. Please do not wait until the last minute to call your pharmacy. We need at least 48 hours (2days) to fill prescriptions. We also encourage you to call your pharmacy before going to  your prescription to make sure it is ready. With regard to controlled substance prescription refill requests (narcotic refills) that need to be picked up at our office, we ask your cooperation by providing us with at least 72 hours (3days) notice that you will need a refill. We will not refill narcotic prescription refill requests after 4:00pm on any weekday, Monday through Thursday, or after 2:00pm on Fridays, or on the weekends. We encourage everyone to explore another way of getting your prescription refill request processed using [employer], our patient web portal through our electronic medical record system. [employer] is an efficient and effective way to communicate your medication request directly to the office and  downloadable as an tsering on your smart phone . [employer] also features a review functionality that allows you to view your medication list as well as leave messages for your physician. Are you ready to get connected? If so please review the attatched instructions or speak to any of our staff to get you set up right away! Thank you so much for your cooperation. Should you have any questions please contact our Practice Administrator.     The Physicians and Staff,  [facility]        Migraine Headache: Care Instructions  Your Care Instructions  Migraines are painful, throbbing headaches that often start on one side of the head. They may cause nausea and vomiting and make you sensitive to light, sound, or smell. Without treatment, migraines can last from 4 hours to a few days. Medicines can help prevent migraines or stop them after they have started. Your doctor can help you find which ones work best for you. Follow-up care is a key part of your treatment and safety. Be sure to make and go to all appointments, and call your doctor if you are having problems. It's also a good idea to know your test results and keep a list of the medicines you take. How can you care for yourself at home? · Do not drive if you have taken a prescription pain medicine. · Rest in a quiet, dark room until your headache is gone. Close your eyes, and try to relax or go to sleep. Don't watch TV or read. · Put a cold, moist cloth or cold pack on the painful area for 10 to 20 minutes at a time. Put a thin cloth between the cold pack and your skin. · Use a warm, moist towel or a heating pad set on low to relax tight shoulder and neck muscles. · Have someone gently massage your neck and shoulders. · Take your medicines exactly as prescribed. Call your doctor if you think you are having a problem with your medicine. You will get more details on the specific medicines your doctor prescribes. · Be careful not to take pain medicine more often than the instructions allow. You could get worse or more frequent headaches when the medicine wears off. To prevent migraines  · Keep a headache diary so you can figure out what triggers your headaches. Avoiding triggers may help you prevent headaches. Record when each headache began, how long it lasted, and what the pain was like. (Was it throbbing, aching, stabbing, or dull?) Write down any other symptoms you had with the headache, such as nausea, flashing lights or dark spots, or sensitivity to bright light or loud noise. Note if the headache occurred near your period. List anything that might have triggered the headache. Triggers may include certain foods (chocolate, cheese, wine) or odors, smoke, bright light, stress, or lack of sleep. · If your doctor has prescribed medicine for your migraines, take it as directed. You may have medicine that you take only when you get a migraine and medicine that you take all the time to help prevent migraines. ¨ If your doctor has prescribed medicine for when you get a headache, take it at the first sign of a migraine, unless your doctor has given you other instructions. ¨ If your doctor has prescribed medicine to prevent migraines, take it exactly as prescribed. Call your doctor if you think you are having a problem with your medicine. · Find healthy ways to deal with stress. Migraines are most common during or right after stressful times. Take time to relax before and after you do something that has caused a migraine in the past.  · Try to keep your muscles relaxed by keeping good posture. Check your jaw, face, neck, and shoulder muscles for tension. Try to relax them. When you sit at a desk, change positions often. And make sure to stretch for 30 seconds each hour. · Get plenty of sleep and exercise. · Eat meals on a regular schedule. Avoid foods and drinks that often trigger migraines. These include chocolate, alcohol (especially red wine and port), aspartame, monosodium glutamate (MSG), and some additives found in foods (such as hot dogs, mora, cold cuts, aged cheeses, and pickled foods). · Limit caffeine. Don't drink too much coffee, tea, or soda. But don't quit caffeine suddenly. That can also give you migraines. · Do not smoke or allow others to smoke around you. If you need help quitting, talk to your doctor about stop-smoking programs and medicines. These can increase your chances of quitting for good.   · If you are taking birth control pills or hormone therapy, talk to your doctor about whether they are triggering your migraines. When should you call for help? Call 911 anytime you think you may need emergency care. For example, call if:  ? · You have signs of a stroke. These may include:  ¨ Sudden numbness, paralysis, or weakness in your face, arm, or leg, especially on only one side of your body. ¨ Sudden vision changes. ¨ Sudden trouble speaking. ¨ Sudden confusion or trouble understanding simple statements. ¨ Sudden problems with walking or balance. ¨ A sudden, severe headache that is different from past headaches. ?Call your doctor now or seek immediate medical care if:  ? · You have new or worse nausea and vomiting. ? · You have a new or higher fever. ? · Your headache gets much worse. ? Watch closely for changes in your health, and be sure to contact your doctor if:  ? · You are not getting better after 2 days (48 hours). Where can you learn more? Go to http://vilma-francois.info/. Enter G645 in the search box to learn more about \"Migraine Headache: Care Instructions. \"  Current as of: October 14, 2016  Content Version: 11.4  © 7257-7043 [employer]. Care instructions adapted under license by [employer] (which disclaims liability or warranty for this information). If you have questions about a medical condition or this instruction, always ask your healthcare professional. [place] [address] any warranty or liability for your use of this information.

## 2018-03-05 NOTE — LETTER
3/5/2018 4:19 PM 
 
Patient:  Cullen Toribio YOB: 1990 Date of Visit: 3/5/2018 [place] 
[id number] [address] VIA In Basket 
 : Thank you for referring Ms. Oneyda Busby to me for evaluation/treatment. Below are the relevant portions of my assessment and plan of care. If you have questions, please do not hesitate to call me. I look forward to following Ms. Xiao Angel along with you. Sincerely, Mary Cho MD

## 2018-03-09 ENCOUNTER — HOSPITAL ENCOUNTER (EMERGENCY)
Age: 28
Discharge: HOME | End: 2018-03-09
Attending: EMERGENCY MEDICINE
Payer: COMMERCIAL

## 2018-03-09 VITALS
HEART RATE: 85 BPM | DIASTOLIC BLOOD PRESSURE: 96 MMHG | RESPIRATION RATE: 16 BRPM | TEMPERATURE: 97.8 F | HEIGHT: 66 IN | OXYGEN SATURATION: 98 % | WEIGHT: 193.12 LBS | BODY MASS INDEX: 31.04 KG/M2 | SYSTOLIC BLOOD PRESSURE: 126 MMHG

## 2018-03-09 DIAGNOSIS — M25.552 BILATERAL HIP PAIN: ICD-10-CM

## 2018-03-09 DIAGNOSIS — N30.00 ACUTE CYSTITIS WITHOUT HEMATURIA: ICD-10-CM

## 2018-03-09 DIAGNOSIS — M25.551 BILATERAL HIP PAIN: ICD-10-CM

## 2018-03-09 DIAGNOSIS — S16.1XXA STRAIN OF NECK MUSCLE, INITIAL ENCOUNTER: Primary | ICD-10-CM
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
## 2018-03-09 LAB
APPEARANCE UR: ABNORMAL
BACTERIA URNS QL MICRO: ABNORMAL /HPF
BILIRUB UR QL: NEGATIVE
COLOR UR: ABNORMAL
EPITH CASTS URNS QL MICRO: ABNORMAL /LPF
GLUCOSE UR STRIP.AUTO-MCNC: NEGATIVE MG/DL
HCG UR QL: NEGATIVE
HGB UR QL STRIP: ABNORMAL
HYALINE CASTS URNS QL MICRO: ABNORMAL /LPF (ref 0–5)
KETONES UR QL STRIP.AUTO: NEGATIVE MG/DL
LEUKOCYTE ESTERASE UR QL STRIP.AUTO: ABNORMAL
NITRITE UR QL STRIP.AUTO: NEGATIVE
PH UR STRIP: 6 [PH] (ref 5–8)
PROT UR STRIP-MCNC: NEGATIVE MG/DL
RBC #/AREA URNS HPF: ABNORMAL /HPF (ref 0–5)
SP GR UR REFRACTOMETRY: 1.01 (ref 1–1.03)
UA: UC IF INDICATED,UAUC: ABNORMAL
UROBILINOGEN UR QL STRIP.AUTO: 0.2 EU/DL (ref 0.2–1)
WBC URNS QL MICRO: ABNORMAL /HPF (ref 0–4)

## 2018-03-09 PROCEDURE — 81025 URINE PREGNANCY TEST: CPT

## 2018-03-09 PROCEDURE — 81001 URINALYSIS AUTO W/SCOPE: CPT

## 2018-03-09 PROCEDURE — 99284 EMERGENCY DEPT VISIT MOD MDM: CPT

## 2018-03-09 PROCEDURE — 87086 URINE CULTURE/COLONY COUNT: CPT | Performed by: EMERGENCY MEDICINE

## 2018-03-09 RX ORDER — HYDROCODONE BITARTRATE AND ACETAMINOPHEN 5; 325 MG/1; MG/1
1 TABLET ORAL
Qty: 10 TAB | Refills: 0 | Status: SHIPPED | OUTPATIENT
Start: 2018-03-09 | End: 2018-05-22 | Stop reason: ALTCHOICE

## 2018-03-09 RX ORDER — CYCLOBENZAPRINE HCL 10 MG
10 TABLET ORAL
Qty: 20 TAB | Refills: 0 | Status: SHIPPED | OUTPATIENT
Start: 2018-03-09 | End: 2018-05-22 | Stop reason: SDUPTHER

## 2018-03-09 RX ORDER — NITROFURANTOIN 25; 75 MG/1; MG/1
100 CAPSULE ORAL 2 TIMES DAILY
Qty: 6 CAP | Refills: 0 | Status: SHIPPED | OUTPATIENT
Start: 2018-03-09 | End: 2018-03-12

## 2018-03-09 RX ORDER — IBUPROFEN 600 MG/1
600 TABLET ORAL
Qty: 20 TAB | Refills: 0 | Status: SHIPPED | OUTPATIENT
Start: 2018-03-09 | End: 2018-05-22 | Stop reason: ALTCHOICE
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
## 2018-03-09 NOTE — LETTER
[address] 
[facility] EMERGENCY DEPT 
[address] [place] [address] [address] [id number] [phone] Work/School Note Date: 3/9/2018 To Whom It May concern: 
 
Aaron Mojica was seen and treated today in the emergency room by the following provider(s): 
Attending Provider: Logan Bejarano DO Physician Assistant: FANNIE Mercer. Aaron Mojica may return to work on [id number]. Sincerely, FANNIE Mercer

## 2018-03-10 NOTE — ED NOTES
Pt given discharge instructions by FANNIE Pena. Discharged ambulatory with steady gait. No acute distress at time of discharge.

## 2018-03-11 LAB
BACTERIA SPEC CULT: NORMAL
CC UR VC: NORMAL
SERVICE CMNT-IMP: NORMAL

## 2018-03-27 ENCOUNTER — PATIENT MESSAGE (OUTPATIENT)
Dept: FAMILY MEDICINE CLINIC | Age: 28
End: 2018-03-27

## 2018-03-27 DIAGNOSIS — M54.5 ACUTE BILATERAL LOW BACK PAIN, WITH SCIATICA PRESENCE UNSPECIFIED: ICD-10-CM

## 2018-03-27 DIAGNOSIS — M54.2 NECK PAIN: ICD-10-CM
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
## 2018-03-27 DIAGNOSIS — G43.901 MIGRAINE WITH STATUS MIGRAINOSUS, NOT INTRACTABLE, UNSPECIFIED MIGRAINE TYPE: ICD-10-CM

## 2018-04-10 ENCOUNTER — HOSPITAL ENCOUNTER (OUTPATIENT)
Dept: PHYSICAL THERAPY | Age: 28
Discharge: HOME | End: 2018-04-10
Payer: COMMERCIAL

## 2018-04-10 PROCEDURE — 97110 THERAPEUTIC EXERCISES: CPT | Performed by: PHYSICAL THERAPIST

## 2018-04-10 PROCEDURE — 97162 PT EVAL MOD COMPLEX 30 MIN: CPT | Performed by: PHYSICAL THERAPIST
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
## 2018-04-16 ENCOUNTER — HOSPITAL ENCOUNTER (OUTPATIENT)
Dept: PHYSICAL THERAPY | Age: 28
End: 2018-04-16
Payer: COMMERCIAL

## 2018-04-18 ENCOUNTER — APPOINTMENT (OUTPATIENT)
Dept: PHYSICAL THERAPY | Age: 28
End: 2018-04-18
Payer: COMMERCIAL

## 2018-04-23 ENCOUNTER — APPOINTMENT (OUTPATIENT)
Dept: PHYSICAL THERAPY | Age: 28
End: 2018-04-23
Payer: COMMERCIAL

## 2018-04-24 ENCOUNTER — HOSPITAL ENCOUNTER (OUTPATIENT)
Dept: PHYSICAL THERAPY | Age: 28
Discharge: HOME | End: 2018-04-24
Payer: COMMERCIAL

## 2018-04-24 PROCEDURE — 97014 ELECTRIC STIMULATION THERAPY: CPT | Performed by: PHYSICAL THERAPIST

## 2018-04-24 PROCEDURE — 97110 THERAPEUTIC EXERCISES: CPT | Performed by: PHYSICAL THERAPIST

## 2018-04-24 NOTE — PROGRESS NOTES
PT DAILY TREATMENT NOTE 2-15    Patient Name: Su Ng  Date:2018  : 1990  [x]  Patient  Verified  Payor: JAJA [place] / Plan: [address] [place] [address] / Product Type: PPO /    In time: 7:30am  Out time: 8:32am  Total Treatment Time (min): 62  Visit #: 2     Treatment Area: Neck pain [M54.2]  Low back pain [M54.5]    SUBJECTIVE  Pain Level (0-10 scale): 7  Any medication changes, allergies to medications, adverse drug reactions, diagnosis change, or new procedure performed?: [x] No    [] Yes (see summary sheet for update)  Subjective functional status/changes:   [] No changes reported  The patient reports that the cold and rain usually make it worse, so it's a bad day, but she's trying the exercises at home (only some daily). She also did a 5 mile hike (one way up, so 10 miles total), but reports that she can't  one place for long. OBJECTIVE    Modality rationale: decrease edema, decrease inflammation, decrease pain and increase tissue extensibility to improve the patients ability to perform daily activities.    Min Type Additional Details   15 [x] Estim: []Att   [x]Unatt        []TENS instruct                  [x]IFC  []Premod   []NMES                     []Other:  []w/US   []w/ice   [x]w/heat  Position: supine  Location: lumbar    []  Traction: [] Cervical       []Lumbar                       [] Prone          []Supine                       []Intermittent   []Continuous Lbs:  [] before manual  [] after manual  []w/heat    []  Ultrasound: []Continuous   [] Pulsed at:                            []1MHz   []3MHz Location:  W/cm2:    []  Paraffin         Location:  []w/heat    []  Ice     []  Heat  []  Ice massage Position:  Location:    []  Laser  []  Other: Position:  Location:    []  Vasopneumatic Device Pressure:       [] lo [] med [] hi   Temperature:    [x] Skin assessment post-treatment:  [x]intact []redness- no adverse reaction    []redness  adverse reaction:     45 min Therapeutic Exercise:  [x] See flow sheet :   Rationale: increase ROM, increase strength and improve coordination to improve the patients ability to perform daily activities. With   [x] TE   [] TA   [] neuro   [] other: Patient Education: [x] Review HEP    [x] Progressed/Changed HEP based on:   [x] positioning   [x] body mechanics   [] transfers   [] heat/ice application    [] other:      Other Objective/Functional Measures: Pt. Tolerated therex well     Pain Level (0-10 scale) post treatment: 6    ASSESSMENT/Changes in Function:     The patient has poor coordination and stability overall and needed cues for her homework. Patient will continue to benefit from skilled PT services to modify and progress therapeutic interventions, address functional mobility deficits, address ROM deficits, address strength deficits, analyze and address soft tissue restrictions, analyze and cue movement patterns, analyze and modify body mechanics/ergonomics, assess and modify postural abnormalities, address imbalance/dizziness and instruct in home and community integration to attain remaining goals. [x]  See Plan of Care  []  See progress note/recertification  []  See Discharge Summary         Progress towards goals / Updated goals: The patient is progressing towards goals.     PLAN  [x]  Upgrade activities as tolerated     [x]  Continue plan of care  [x]  Update interventions per flow sheet       []  Discharge due to:_  []  Other:_      Sandy Macias, PT 4/24/2018  7:49 AM

## 2018-04-25 ENCOUNTER — APPOINTMENT (OUTPATIENT)
Dept: PHYSICAL THERAPY | Age: 28
End: 2018-04-25
Payer: COMMERCIAL

## 2018-05-02 ENCOUNTER — APPOINTMENT (OUTPATIENT)
Dept: PHYSICAL THERAPY | Age: 28
End: 2018-05-02
Payer: COMMERCIAL

## 2018-05-02 ENCOUNTER — HOSPITAL ENCOUNTER (OUTPATIENT)
Dept: PHYSICAL THERAPY | Age: 28
Discharge: HOME | End: 2018-05-02
Payer: COMMERCIAL

## 2018-05-02 PROCEDURE — 97110 THERAPEUTIC EXERCISES: CPT | Performed by: PHYSICAL THERAPIST

## 2018-05-02 NOTE — PROGRESS NOTES
PT DAILY TREATMENT NOTE 2-15    Patient Name: Su Ng  Date:2018  : 1990  [x]  Patient  Verified  Payor: JAJA [place] / Plan: [address] [place] [address] / Product Type: PPO /    In time: 6:00pm  Out time: 6:54pm  Total Treatment Time (min): 54  Visit #: 3     Treatment Area: Neck pain [M54.2]  Low back pain [M54.5]    SUBJECTIVE  Pain Level (0-10 scale): 7  Any medication changes, allergies to medications, adverse drug reactions, diagnosis change, or new procedure performed?: [x] No    [] Yes (see summary sheet for update)  Subjective functional status/changes:   [] No changes reported  The patient reports that she's doing okay, it's always between a 7-810. OBJECTIVE    54 min Therapeutic Exercise:  [x] See flow sheet :   Rationale: increase ROM, increase strength and improve coordination to improve the patients ability to perform daily activities. With   [x] TE   [] TA   [] neuro   [] other: Patient Education: [x] Review HEP    [x] Progressed/Changed HEP based on:   [x] positioning   [x] body mechanics   [] transfers   [] heat/ice application    [] other:      Other Objective/Functional Measures: Pt. Had improved stability today     Pain Level (0-10 scale) post treatment: 5    ASSESSMENT/Changes in Function:     The patient progressed with improved functional mobility tolerance today. Patient will continue to benefit from skilled PT services to modify and progress therapeutic interventions, address functional mobility deficits, address ROM deficits, address strength deficits, analyze and address soft tissue restrictions, analyze and cue movement patterns, analyze and modify body mechanics/ergonomics, assess and modify postural abnormalities, address imbalance/dizziness and instruct in home and community integration to attain remaining goals.      [x]  See Plan of Care  []  See progress note/recertification  []  See Discharge Summary         Progress towards goals / Updated goals:  The patient is progressing towards goals.     PLAN  [x]  Upgrade activities as tolerated     [x]  Continue plan of care  [x]  Update interventions per flow sheet       []  Discharge due to:_  []  Other:_      Emely Miles PT 5/2/2018  6:09 PM

## 2018-05-07 ENCOUNTER — HOSPITAL ENCOUNTER (OUTPATIENT)
Dept: PHYSICAL THERAPY | Age: 28
Discharge: HOME | End: 2018-05-07
Payer: COMMERCIAL

## 2018-05-07 PROCEDURE — 97110 THERAPEUTIC EXERCISES: CPT | Performed by: PHYSICAL THERAPIST

## 2018-05-07 NOTE — PROGRESS NOTES
PT DAILY TREATMENT NOTE 2-15    Patient Name: Vargas Beavers  Date:2018  : 1990  [x]  Patient  Verified  Payor: [employer] [place] / Plan: [address] [place] [address] / Product Type: PPO /    In time: 5:50pm  Out time: 6:44pm  Total Treatment Time (min): 54  Visit #: 4     Treatment Area: Neck pain [M54.2]  Low back pain [M54.5]    SUBJECTIVE  Pain Level (0-10 scale): 6  Any medication changes, allergies to medications, adverse drug reactions, diagnosis change, or new procedure performed?: [x] No    [] Yes (see summary sheet for update)  Subjective functional status/changes:   [] No changes reported  The patient reports that she felt okay over the weekend, she worked and will still get tingling/pin pricks in her left foot and is limited to 10lbs when at work. OBJECTIVE    54 min Therapeutic Exercise:  [x] See flow sheet :   Rationale: increase ROM, increase strength and improve coordination to improve the patients ability to perform daily activities. With   [x] TE   [] TA   [] neuro   [] other: Patient Education: [x] Review HEP    [x] Progressed/Changed HEP based on:   [x] positioning   [x] body mechanics   [] transfers   [] heat/ice application    [] other:      Other Objective/Functional Measures: Pt. Had no increase in symptoms during exercise     Pain Level (0-10 scale) post treatment: 5    ASSESSMENT/Changes in Function:     The patient progressed with tolerance to therex with increased resistance and improved mobility. Patient will continue to benefit from skilled PT services to modify and progress therapeutic interventions, address functional mobility deficits, address ROM deficits, address strength deficits, analyze and address soft tissue restrictions, analyze and cue movement patterns, analyze and modify body mechanics/ergonomics, assess and modify postural abnormalities, address imbalance/dizziness and instruct in home and community integration to attain remaining goals.      [x] See Plan of Care  []  See progress note/recertification  []  See Discharge Summary         Progress towards goals / Updated goals: The patient is progressing towards goals.     PLAN  [x]  Upgrade activities as tolerated     [x]  Continue plan of care  [x]  Update interventions per flow sheet       []  Discharge due to:_  []  Other:_      Tapan Schultz, PT 5/7/2018  6:00 PM

## 2018-05-09 ENCOUNTER — HOSPITAL ENCOUNTER (OUTPATIENT)
Dept: PHYSICAL THERAPY | Age: 28
Discharge: HOME | End: 2018-05-09
Payer: COMMERCIAL

## 2018-05-09 ENCOUNTER — APPOINTMENT (OUTPATIENT)
Dept: PHYSICAL THERAPY | Age: 28
End: 2018-05-09
Payer: COMMERCIAL

## 2018-05-09 PROCEDURE — 97014 ELECTRIC STIMULATION THERAPY: CPT | Performed by: PHYSICAL THERAPIST

## 2018-05-09 PROCEDURE — 97110 THERAPEUTIC EXERCISES: CPT | Performed by: PHYSICAL THERAPIST
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
## 2018-05-09 NOTE — PROGRESS NOTES
PT DAILY TREATMENT NOTE 2-15    Patient Name: Jo Cohen  Date:2018  : 1990  [x]  Patient  Verified  Payor: JAJA [place] / Plan: [address] [place] [address] / Product Type: PPO /    In time: 11:34am  Out time: 12:37am  Total Treatment Time (min): 63  Visit #: 5     Treatment Area: Neck pain [M54.2]  Low back pain [M54.5]    SUBJECTIVE  Pain Level (0-10 scale): 8.5  Any medication changes, allergies to medications, adverse drug reactions, diagnosis change, or new procedure performed?: [x] No    [] Yes (see summary sheet for update)  Subjective functional status/changes:   [] No changes reported  The patient reports that she woke up at 3:30am with left lower lumbar/SI joint pain, but went to bed at 7pm.     OBJECTIVE    Modality rationale: decrease edema, decrease inflammation, decrease pain and increase tissue extensibility to improve the patients ability to perform daily activities.    Min Type Additional Details   15 [x] Estim: []Att   [x]Unatt        []TENS instruct                  [x]IFC  []Premod   []NMES                     []Other:  []w/US   []w/ice   [x]w/heat  Position: supine  Location: lumbar    []  Traction: [] Cervical       []Lumbar                       [] Prone          []Supine                       []Intermittent   []Continuous Lbs:  [] before manual  [] after manual  []w/heat    []  Ultrasound: []Continuous   [] Pulsed at:                            []1MHz   []3MHz Location:  W/cm2:    []  Paraffin         Location:  []w/heat    []  Ice     []  Heat  []  Ice massage Position:  Location:    []  Laser  []  Other: Position:  Location:    []  Vasopneumatic Device Pressure:       [] lo [] med [] hi   Temperature:    [x] Skin assessment post-treatment:  [x]intact []redness- no adverse reaction    []redness  adverse reaction:     45 min Therapeutic Exercise:  [x] See flow sheet :   Rationale: increase ROM, increase strength and improve coordination to improve the patients ability to perform daily activities. With   [x] TE   [] TA   [] neuro   [] other: Patient Education: [x] Review HEP    [x] Progressed/Changed HEP based on:   [x] positioning   [x] body mechanics   [] transfers   [] heat/ice application    [] other:      Other Objective/Functional Measures: FOTO= Neck: 96 (39 at eval); lumbar: 30 (29 at eval); Cervical AROM rotation: R= 60 (45 at eval); L= 60 (25 at eval)     Pain Level (0-10 scale) post treatment: 8    ASSESSMENT/Changes in Function:     The patient was slightly limited to therex due to a flare up in her left lumbar spine. Patient will continue to benefit from skilled PT services to modify and progress therapeutic interventions, address functional mobility deficits, address ROM deficits, address strength deficits, analyze and address soft tissue restrictions, analyze and cue movement patterns, analyze and modify body mechanics/ergonomics, assess and modify postural abnormalities, address imbalance/dizziness and instruct in home and community integration to attain remaining goals. [x]  See Plan of Care  []  See progress note/recertification  []  See Discharge Summary         Progress towards goals / Updated goals: The patient is progressing slowly towards goals.     PLAN  [x]  Upgrade activities as tolerated     [x]  Continue plan of care  [x]  Update interventions per flow sheet       []  Discharge due to:_  []  Other:_      [employer], PT 5/9/2018  11:45 AM

## 2018-05-17 ENCOUNTER — APPOINTMENT (OUTPATIENT)
Dept: PHYSICAL THERAPY | Age: 28
End: 2018-05-17
Payer: COMMERCIAL

## 2018-05-20 ENCOUNTER — APPOINTMENT (OUTPATIENT)
Dept: ULTRASOUND IMAGING | Age: 28
End: 2018-05-20
Payer: COMMERCIAL

## 2018-05-20 ENCOUNTER — HOSPITAL ENCOUNTER (EMERGENCY)
Age: 28
Discharge: HOME | End: 2018-05-20
Attending: EMERGENCY MEDICINE
Payer: COMMERCIAL

## 2018-05-20 ENCOUNTER — APPOINTMENT (OUTPATIENT)
Dept: CT IMAGING | Age: 28
End: 2018-05-20
Attending: EMERGENCY MEDICINE
Payer: COMMERCIAL

## 2018-05-20 VITALS
HEART RATE: 114 BPM | WEIGHT: 191.14 LBS | BODY MASS INDEX: 30.85 KG/M2 | TEMPERATURE: 98.3 F | OXYGEN SATURATION: 100 % | SYSTOLIC BLOOD PRESSURE: 124 MMHG | DIASTOLIC BLOOD PRESSURE: 68 MMHG | RESPIRATION RATE: 20 BRPM

## 2018-05-20 DIAGNOSIS — A08.4 VIRAL GASTROENTERITIS: ICD-10-CM

## 2018-05-20 DIAGNOSIS — R10.84 ABDOMINAL PAIN, GENERALIZED: ICD-10-CM

## 2018-05-20 DIAGNOSIS — R19.7 DIARRHEA, UNSPECIFIED TYPE: ICD-10-CM

## 2018-05-20 DIAGNOSIS — R11.2 NON-INTRACTABLE VOMITING WITH NAUSEA, UNSPECIFIED VOMITING TYPE: Primary | ICD-10-CM

## 2018-05-20 LAB
ALBUMIN SERPL-MCNC: 4 G/DL (ref 3.5–5)
ALBUMIN/GLOB SERPL: 1.1 {RATIO} (ref 1.1–2.2)
ALP SERPL-CCNC: 48 U/L (ref 45–117)
ALT SERPL-CCNC: 21 U/L (ref 12–78)
ANION GAP SERPL CALC-SCNC: 4 MMOL/L (ref 5–15)
APPEARANCE UR: CLEAR
AST SERPL-CCNC: 11 U/L (ref 15–37)
BACTERIA URNS QL MICRO: NEGATIVE /HPF
BASOPHILS # BLD: 0 K/UL (ref 0–0.1)
BASOPHILS NFR BLD: 0 % (ref 0–1)
BILIRUB SERPL-MCNC: 0.5 MG/DL (ref 0.2–1)
BILIRUB UR QL: NEGATIVE
BUN SERPL-MCNC: 13 MG/DL (ref 6–20)
BUN/CREAT SERPL: 15 (ref 12–20)
CALCIUM SERPL-MCNC: 8.6 MG/DL (ref 8.5–10.1)
CHLORIDE SERPL-SCNC: 110 MMOL/L (ref 97–108)
CO2 SERPL-SCNC: 25 MMOL/L (ref 21–32)
COLOR UR: ABNORMAL
CREAT SERPL-MCNC: 0.88 MG/DL (ref 0.55–1.02)
DIFFERENTIAL METHOD BLD: ABNORMAL
EOSINOPHIL # BLD: 0.1 K/UL (ref 0–0.4)
EOSINOPHIL NFR BLD: 1 % (ref 0–7)
EPITH CASTS URNS QL MICRO: ABNORMAL /LPF
ERYTHROCYTE [DISTWIDTH] IN BLOOD BY AUTOMATED COUNT: 12.8 % (ref 11.5–14.5)
GLOBULIN SER CALC-MCNC: 3.6 G/DL (ref 2–4)
GLUCOSE SERPL-MCNC: 91 MG/DL (ref 65–100)
GLUCOSE UR STRIP.AUTO-MCNC: NEGATIVE MG/DL
HCG UR QL: NEGATIVE
HCT VFR BLD AUTO: 41.7 % (ref 35–47)
HGB BLD-MCNC: 13.9 G/DL (ref 11.5–16)
HGB UR QL STRIP: ABNORMAL
HYALINE CASTS URNS QL MICRO: ABNORMAL /LPF (ref 0–5)
IMM GRANULOCYTES # BLD: 0 K/UL (ref 0–0.04)
IMM GRANULOCYTES NFR BLD AUTO: 0 % (ref 0–0.5)
KETONES UR QL STRIP.AUTO: NEGATIVE MG/DL
LEUKOCYTE ESTERASE UR QL STRIP.AUTO: ABNORMAL
LYMPHOCYTES # BLD: 0.6 K/UL (ref 0.8–3.5)
LYMPHOCYTES NFR BLD: 5 % (ref 12–49)
MCH RBC QN AUTO: 30.3 PG (ref 26–34)
MCHC RBC AUTO-ENTMCNC: 33.3 G/DL (ref 30–36.5)
MCV RBC AUTO: 90.8 FL (ref 80–99)
MONOCYTES # BLD: 0.5 K/UL (ref 0–1)
MONOCYTES NFR BLD: 4 % (ref 5–13)
NEUTS SEG # BLD: 10.1 K/UL (ref 1.8–8)
NEUTS SEG NFR BLD: 90 % (ref 32–75)
NITRITE UR QL STRIP.AUTO: NEGATIVE
NRBC # BLD: 0 K/UL (ref 0–0.01)
NRBC BLD-RTO: 0 PER 100 WBC
PH UR STRIP: 5.5 [PH] (ref 5–8)
PLATELET # BLD AUTO: 260 K/UL (ref 150–400)
PMV BLD AUTO: 10.8 FL (ref 8.9–12.9)
POTASSIUM SERPL-SCNC: 3.6 MMOL/L (ref 3.5–5.1)
PROT SERPL-MCNC: 7.6 G/DL (ref 6.4–8.2)
PROT UR STRIP-MCNC: NEGATIVE MG/DL
RBC # BLD AUTO: 4.59 M/UL (ref 3.8–5.2)
RBC #/AREA URNS HPF: ABNORMAL /HPF (ref 0–5)
RBC MORPH BLD: ABNORMAL
SODIUM SERPL-SCNC: 139 MMOL/L (ref 136–145)
SP GR UR REFRACTOMETRY: 1.02 (ref 1–1.03)
UA: UC IF INDICATED,UAUC: ABNORMAL
UROBILINOGEN UR QL STRIP.AUTO: 0.2 EU/DL (ref 0.2–1)
WBC # BLD AUTO: 11.3 K/UL (ref 3.6–11)
WBC URNS QL MICRO: ABNORMAL /HPF (ref 0–4)

## 2018-05-20 PROCEDURE — 36415 COLL VENOUS BLD VENIPUNCTURE: CPT | Performed by: EMERGENCY MEDICINE

## 2018-05-20 PROCEDURE — 74011636320 HC RX REV CODE- 636/320: Performed by: EMERGENCY MEDICINE

## 2018-05-20 PROCEDURE — 81001 URINALYSIS AUTO W/SCOPE: CPT | Performed by: EMERGENCY MEDICINE

## 2018-05-20 PROCEDURE — 76705 ECHO EXAM OF ABDOMEN: CPT

## 2018-05-20 PROCEDURE — 96374 THER/PROPH/DIAG INJ IV PUSH: CPT

## 2018-05-20 PROCEDURE — 96361 HYDRATE IV INFUSION ADD-ON: CPT

## 2018-05-20 PROCEDURE — 74011250636 HC RX REV CODE- 250/636: Performed by: EMERGENCY MEDICINE

## 2018-05-20 PROCEDURE — 85025 COMPLETE CBC W/AUTO DIFF WBC: CPT | Performed by: EMERGENCY MEDICINE

## 2018-05-20 PROCEDURE — 81025 URINE PREGNANCY TEST: CPT | Performed by: EMERGENCY MEDICINE

## 2018-05-20 PROCEDURE — 74177 CT ABD & PELVIS W/CONTRAST: CPT

## 2018-05-20 PROCEDURE — 96375 TX/PRO/DX INJ NEW DRUG ADDON: CPT

## 2018-05-20 PROCEDURE — 99283 EMERGENCY DEPT VISIT LOW MDM: CPT

## 2018-05-20 PROCEDURE — 80053 COMPREHEN METABOLIC PANEL: CPT | Performed by: EMERGENCY MEDICINE

## 2018-05-20 RX ORDER — SODIUM CHLORIDE 0.9 % (FLUSH) 0.9 %
10 SYRINGE (ML) INJECTION
Status: COMPLETED | OUTPATIENT
Start: 2018-05-20 | End: 2018-05-20

## 2018-05-20 RX ORDER — MORPHINE SULFATE 4 MG/ML
4 INJECTION INTRAVENOUS
Status: COMPLETED | OUTPATIENT
Start: 2018-05-20 | End: 2018-05-20

## 2018-05-20 RX ORDER — LOPERAMIDE HYDROCHLORIDE 2 MG/1
2 CAPSULE ORAL
Qty: 20 CAP | Refills: 0 | Status: SHIPPED | OUTPATIENT
Start: 2018-05-20 | End: 2018-05-22 | Stop reason: ALTCHOICE

## 2018-05-20 RX ORDER — ONDANSETRON 4 MG/1
4 TABLET, ORALLY DISINTEGRATING ORAL
Qty: 20 TAB | Refills: 0 | Status: SHIPPED | OUTPATIENT
Start: 2018-05-20 | End: 2018-05-22 | Stop reason: ALTCHOICE

## 2018-05-20 RX ORDER — OXYCODONE AND ACETAMINOPHEN 5; 325 MG/1; MG/1
1 TABLET ORAL
Qty: 10 TAB | Refills: 0 | Status: SHIPPED | OUTPATIENT
Start: 2018-05-20 | End: 2018-05-22 | Stop reason: ALTCHOICE

## 2018-05-20 RX ORDER — DICYCLOMINE HYDROCHLORIDE 20 MG/1
20 TABLET ORAL EVERY 6 HOURS
Qty: 20 TAB | Refills: 0 | Status: SHIPPED | OUTPATIENT
Start: 2018-05-20 | End: 2018-05-22 | Stop reason: ALTCHOICE

## 2018-05-20 RX ORDER — ONDANSETRON 2 MG/ML
4 INJECTION INTRAMUSCULAR; INTRAVENOUS
Status: COMPLETED | OUTPATIENT
Start: 2018-05-20 | End: 2018-05-20

## 2018-05-20 RX ORDER — SODIUM CHLORIDE 9 MG/ML
50 INJECTION, SOLUTION INTRAVENOUS
Status: COMPLETED | OUTPATIENT
Start: 2018-05-20 | End: 2018-05-20

## 2018-05-20 RX ADMIN — ONDANSETRON 4 MG: 2 INJECTION INTRAMUSCULAR; INTRAVENOUS at 20:45

## 2018-05-20 RX ADMIN — IOPAMIDOL 100 ML: 755 INJECTION, SOLUTION INTRAVENOUS at 20:29

## 2018-05-20 RX ADMIN — Medication 10 ML: at 21:29

## 2018-05-20 RX ADMIN — SODIUM CHLORIDE 500 ML: 900 INJECTION, SOLUTION INTRAVENOUS at 21:29

## 2018-05-20 RX ADMIN — MORPHINE SULFATE 4 MG: 4 INJECTION INTRAVENOUS at 20:45

## 2018-05-20 RX ADMIN — SODIUM CHLORIDE 1000 ML: 900 INJECTION, SOLUTION INTRAVENOUS at 20:45

## 2018-05-20 RX ADMIN — SODIUM CHLORIDE 50 ML/HR: 900 INJECTION, SOLUTION INTRAVENOUS at 20:29
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
## 2018-05-21 NOTE — ED NOTES
Pt discharged with written instructions and prescriptions at this time. Pt verbalizes understanding and all questions were answered. Discharged by Dr. Willi Newton, in stable condition, ambulatory.

## 2018-05-21 NOTE — DISCHARGE INSTRUCTIONS
Thank you! Thank you for allowing us to provide you with excellent care today. We hope we addressed all of your concerns and needs. We strive to provide excellent quality care in the Emergency Department. You may receive a survey after your visit to evaluate the care you were provided. Should you receive a survey from us, we invite you to share your experience and tell us what made it excellent. It was a pleasure serving you, we invite you to share your experience with us, in our pursuit for excellence, should you be selected to receive a survey. If you feel that you have not received excellent quality care or timely care, please ask to speak to the nurse manager. Please choose us in the future for your continued health care needs. ------------------------------------------------------------------------------------------------------------  The exam and treatment you received in the Emergency Department were for an urgent problem and are not intended as complete care. It is important that you follow up with a doctor, nurse practitioner, or physician assistant for ongoing care. If your symptoms become worse or you do not improve as expected and you are unable to reach your usual health care provider, you should return to the Emergency Department. We are available 24 hours a day. Please take your discharge instructions with you when you go to your follow-up appointment. If you have any problem arranging a follow-up appointment, contact the Emergency Department immediately. If a prescription has been provided, please have it filled as soon as possible to prevent a delay in treatment. Read the entire medication instruction sheet provided to you by the pharmacy. If you have any questions or reservations about taking the medication due to side effects or interactions with other medications, please call your primary care physician or contact the ER to speak with the charge nurse.      Make an appointment with your family doctor or the physician you were referred to for follow-up of this visit as instructed on your discharge paperwork, as this is mandatory follow-up. Return to the ER if you are unable to be seen or if you are unable to be seen in a timely manner. If you have any problem arranging the follow-up visit, contact the Emergency Department immediately. Abdominal Pain: Care Instructions  Your Care Instructions    Abdominal pain has many possible causes. Some aren't serious and get better on their own in a few days. Others need more testing and treatment. If your pain continues or gets worse, you need to be rechecked and may need more tests to find out what is wrong. You may need surgery to correct the problem. Don't ignore new symptoms, such as fever, nausea and vomiting, urination problems, pain that gets worse, and dizziness. These may be signs of a more serious problem. Your doctor may have recommended a follow-up visit in the next 8 to 12 hours. If you are not getting better, you may need more tests or treatment. The doctor has checked you carefully, but problems can develop later. If you notice any problems or new symptoms, get medical treatment right away. Follow-up care is a key part of your treatment and safety. Be sure to make and go to all appointments, and call your doctor if you are having problems. It's also a good idea to know your test results and keep a list of the medicines you take. How can you care for yourself at home? · Rest until you feel better. · To prevent dehydration, drink plenty of fluids, enough so that your urine is light yellow or clear like water. Choose water and other caffeine-free clear liquids until you feel better. If you have kidney, heart, or liver disease and have to limit fluids, talk with your doctor before you increase the amount of fluids you drink.   · If your stomach is upset, eat mild foods, such as rice, dry toast or crackers, bananas, and applesauce. Try eating several small meals instead of two or three large ones. · Wait until 48 hours after all symptoms have gone away before you have spicy foods, alcohol, and drinks that contain caffeine. · Do not eat foods that are high in fat. · Avoid anti-inflammatory medicines such as aspirin, ibuprofen (Advil, Motrin), and naproxen (Aleve). These can cause stomach upset. Talk to your doctor if you take daily aspirin for another health problem. When should you call for help? Call 911 anytime you think you may need emergency care. For example, call if:  ? · You passed out (lost consciousness). ? · You pass maroon or very bloody stools. ? · You vomit blood or what looks like coffee grounds. ? · You have new, severe belly pain. ?Call your doctor now or seek immediate medical care if:  ? · Your pain gets worse, especially if it becomes focused in one area of your belly. ? · You have a new or higher fever. ? · Your stools are black and look like tar, or they have streaks of blood. ? · You have unexpected vaginal bleeding. ? · You have symptoms of a urinary tract infection. These may include:  ¨ Pain when you urinate. ¨ Urinating more often than usual.  ¨ Blood in your urine. ? · You are dizzy or lightheaded, or you feel like you may faint. ? Watch closely for changes in your health, and be sure to contact your doctor if:  ? · You are not getting better after 1 day (24 hours). Where can you learn more? Go to http://vilma-francois.info/. Enter C594 in the search box to learn more about \"Abdominal Pain: Care Instructions. \"  Current as of: March 20, 2017  Content Version: 11.4  © 2312-2281 [employer]. Care instructions adapted under license by [employer] (which disclaims liability or warranty for this information).  If you have questions about a medical condition or this instruction, always ask your healthcare professional. Patti Fuentes, [facility] disclaims any warranty or liability for your use of this information. Diarrhea: Care Instructions  Your Care Instructions    Diarrhea is loose, watery stools (bowel movements). The exact cause is often hard to find. Sometimes diarrhea is your body's way of getting rid of what caused an upset stomach. Viruses, food poisoning, and many medicines can cause diarrhea. Some people get diarrhea in response to emotional stress, anxiety, or certain foods. Almost everyone has diarrhea now and then. It usually isn't serious, and your stools will return to normal soon. The important thing to do is replace the fluids you have lost, so you can prevent dehydration. The doctor has checked you carefully, but problems can develop later. If you notice any problems or new symptoms, get medical treatment right away. Follow-up care is a key part of your treatment and safety. Be sure to make and go to all appointments, and call your doctor if you are having problems. It's also a good idea to know your test results and keep a list of the medicines you take. How can you care for yourself at home? · Watch for signs of dehydration, which means your body has lost too much water. Dehydration is a serious condition and should be treated right away. Signs of dehydration are:  ¨ Increasing thirst and dry eyes and mouth. ¨ Feeling faint or lightheaded. ¨ Darker urine, and a smaller amount of urine than normal.  · To prevent dehydration, drink plenty of fluids, enough so that your urine is light yellow or clear like water. Choose water and other caffeine-free clear liquids until you feel better. If you have kidney, heart, or liver disease and have to limit fluids, talk with your doctor before you increase the amount of fluids you drink. · Begin eating small amounts of mild foods the next day, if you feel like it. ¨ Try yogurt that has live cultures of Lactobacillus.  (Check the label.)  ¨ Avoid spicy foods, fruits, alcohol, and caffeine until 48 hours after all symptoms are gone. ¨ Avoid chewing gum that contains sorbitol. ¨ Avoid dairy products (except for yogurt with Lactobacillus) while you have diarrhea and for 3 days after symptoms are gone. · The doctor may recommend that you take over-the-counter medicine, such as loperamide (Imodium), if you still have diarrhea after 6 hours. Read and follow all instructions on the label. Do not use this medicine if you have bloody diarrhea, a high fever, or other signs of serious illness. Call your doctor if you think you are having a problem with your medicine. When should you call for help? Call 911 anytime you think you may need emergency care. For example, call if:  ? · You passed out (lost consciousness). ? · Your stools are maroon or very bloody. ?Call your doctor now or seek immediate medical care if:  ? · You are dizzy or lightheaded, or you feel like you may faint. ? · Your stools are black and look like tar, or they have streaks of blood. ? · You have new or worse belly pain. ? · You have symptoms of dehydration, such as:  ¨ Dry eyes and a dry mouth. ¨ Passing only a little dark urine. ¨ Feeling thirstier than usual.   ? · You have a new or higher fever. ? Watch closely for changes in your health, and be sure to contact your doctor if:  ? · Your diarrhea is getting worse. ? · You see pus in the diarrhea. ? · You are not getting better after 2 days (48 hours). Where can you learn more? Go to http://vilma-francois.info/. Enter N355 in the search box to learn more about \"Diarrhea: Care Instructions. \"  Current as of: March 20, 2017  Content Version: 11.4  © 4972-9182 [employer]id.to. Care instructions adapted under license by [employer] (which disclaims liability or warranty for this information).  If you have questions about a medical condition or this instruction, always ask your healthcare professional. [employer] disclaims any warranty or liability for your use of this information.

## 2018-05-22 ENCOUNTER — OFFICE VISIT (OUTPATIENT)
Dept: FAMILY MEDICINE CLINIC | Age: 28
End: 2018-05-22

## 2018-05-22 VITALS
HEIGHT: 66 IN | OXYGEN SATURATION: 98 % | WEIGHT: 189 LBS | TEMPERATURE: 97.9 F | SYSTOLIC BLOOD PRESSURE: 114 MMHG | BODY MASS INDEX: 30.37 KG/M2 | DIASTOLIC BLOOD PRESSURE: 85 MMHG | RESPIRATION RATE: 18 BRPM | HEART RATE: 78 BPM

## 2018-05-22 DIAGNOSIS — M54.16 LUMBAR BACK PAIN WITH RADICULOPATHY AFFECTING LEFT LOWER EXTREMITY: ICD-10-CM

## 2018-05-22 DIAGNOSIS — S13.4XXA WHIPLASH INJURY TO NECK, INITIAL ENCOUNTER: ICD-10-CM
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
## 2018-05-22 RX ORDER — NAPROXEN 500 MG/1
500 TABLET ORAL 2 TIMES DAILY WITH MEALS
Qty: 20 TAB | Refills: 0 | Status: SHIPPED | OUTPATIENT
Start: 2018-05-22 | End: 2018-06-01

## 2018-05-22 RX ORDER — HYDROCODONE BITARTRATE AND ACETAMINOPHEN 7.5; 325 MG/1; MG/1
1 TABLET ORAL
Qty: 30 TAB | Refills: 0 | Status: SHIPPED | OUTPATIENT
Start: 2018-05-22 | End: 2018-06-25 | Stop reason: SDUPTHER

## 2018-05-22 RX ORDER — CYCLOBENZAPRINE HCL 10 MG
10 TABLET ORAL
Qty: 20 TAB | Refills: 0 | Status: SHIPPED | OUTPATIENT
Start: 2018-05-22 | End: 2018-06-25 | Stop reason: SDUPTHER
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
## 2018-05-29 ENCOUNTER — APPOINTMENT (OUTPATIENT)
Dept: PHYSICAL THERAPY | Age: 28
End: 2018-05-29
Payer: COMMERCIAL

## 2018-05-31 ENCOUNTER — APPOINTMENT (OUTPATIENT)
Dept: PHYSICAL THERAPY | Age: 28
End: 2018-05-31
Payer: COMMERCIAL

## 2018-06-22 ENCOUNTER — OFFICE VISIT (OUTPATIENT)
Dept: FAMILY MEDICINE CLINIC | Age: 28
End: 2018-06-22

## 2018-06-25 ENCOUNTER — OFFICE VISIT (OUTPATIENT)
Dept: FAMILY MEDICINE CLINIC | Age: 28
End: 2018-06-25

## 2018-06-25 VITALS
DIASTOLIC BLOOD PRESSURE: 77 MMHG | WEIGHT: 189.9 LBS | TEMPERATURE: 97.8 F | SYSTOLIC BLOOD PRESSURE: 108 MMHG | OXYGEN SATURATION: 97 % | BODY MASS INDEX: 30.52 KG/M2 | HEIGHT: 66 IN | HEART RATE: 87 BPM | RESPIRATION RATE: 20 BRPM
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
## 2018-06-25 DIAGNOSIS — M54.2 CERVICALGIA: ICD-10-CM

## 2018-06-25 DIAGNOSIS — M54.16 LUMBAR BACK PAIN WITH RADICULOPATHY AFFECTING LEFT LOWER EXTREMITY: Primary | ICD-10-CM
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
## 2018-06-25 RX ORDER — NALOXONE HYDROCHLORIDE 4 MG/.1ML
SPRAY NASAL
Qty: 1 EACH | Refills: 1 | Status: SHIPPED | OUTPATIENT
Start: 2018-06-25 | End: 2019-02-19 | Stop reason: ALTCHOICE

## 2018-06-25 RX ORDER — HYDROCODONE BITARTRATE AND ACETAMINOPHEN 7.5; 325 MG/1; MG/1
1 TABLET ORAL
Qty: 30 TAB | Refills: 0 | Status: SHIPPED | OUTPATIENT
Start: 2018-06-25 | End: 2018-08-17 | Stop reason: SDUPTHER

## 2018-06-25 RX ORDER — CYCLOBENZAPRINE HCL 10 MG
10 TABLET ORAL
Qty: 20 TAB | Refills: 0 | Status: SHIPPED | OUTPATIENT
Start: 2018-06-25 | End: 2019-01-11
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
## 2018-06-25 NOTE — LETTER
Name:Opal LOMBARDI:[birth year] MR #:[id number] Provider Name:Rosita Rodriguez NP  
*[id number]* BSMG-491 (5/16) Page 1 of 5 Initial [facility] CONTROLLED SUBSTANCE AGREEMENT I may be prescribed medications that are controlled substances as part  of my treatment plan for management of my medical condition(s). The goal of my treatment plan is to maintain and/or improve my health and wellbeing. Because controlled substances have an increased risk of abuse or harm, continual re-evaluation is needed determine if the goals of my treatment plan are being met for my safety and the safety of others. Perez Rodarte  am entering into this Controlled Substance Agreement with my provider, Jose Nesbitt NP at [facility] . I understand that successful treatment requires mutual trust and honesty between me and my provider. I understand that there are state and federal laws and regulations which apply to the medications that my provider may prescribe that must be followed. I understand there are risks and benefits ts of taking the medicines that my provider may prescribe. I understand and agree that following this Agreement is necessary in continuing my provider-patient relationship and success of my treatment plan. As a part of my treatment plan, I agree to the following: COMMUNICATION: 
 
1. I will communicate fully with my provider about my medical condition(s), including the effect on my daily life and how well my medications are helping. I will tell my provider all of the medications that I take for any reason, including medications I receive from another health care provider, and will notify my provider about all issues, problems or concerns, including any side effects, which may be related to my medications. I understand that this information allows my provider to adjust my treatment plan to help manage my medical condition.  I understand that this information will become part of my permanent medical record. 2. I will notify my provider if I have a history of alcohol/drug misuse/addiction or if I have had treatment for alcohol/drug addiction in the past, or if I have a new problem with or concern about alcohol/drug use/addiction, because this increases the likelihood of high risk behaviors and may lead to serious medical conditions. 3. Females Only: I will notify my provider if I am or become pregnant, or if I intend to become pregnant, or if I intend to breastfeed. I understand that communication of these issues with my provider is important, due to possible effects my medication could have on an unborn fetus or breastfeeding child. Name:.Luna Mojica TSS:[birth year] MR #:[id number] Provider Name:Rosita Christensen NP  
*[id number]* BSMG-491 (5/16) Page 2 of 5 Initial [employer] MISUSE OF MEDICATIONS / DRUGS: 
 
1. I agree to take all controlled substances as prescribed, and will not misuse or abuse any controlled substances prescribed by my provider. For my safety, I will not increase the amount of medicine I take without first talking with and getting permission from my provider. 2. If I have a medical emergency, another health care provider may prescribe me medication. If I seek emergency treatment, I will notify my provider within seventy-two (72) hours. 3. I understand that my provider may discuss my use and/or possible misuse/abuse of controlled substances and alcohol, as appropriate, with any health care provider involved in my care, pharmacist or legal authority. ILLEGAL DRUGS: 
 
1. I will not use illegal drugs of any kind, including but not limited to marijuana, heroin, cocaine, or any prescription drug which is not prescribed to me. DRUG DIVERSION / PRESCRIPTION FRAUD: 
 
1. I will not share, sell, trade, give away, or otherwise misuse my prescriptions or medications. 2. I will not alter any prescriptions provided to me by my provider. SINGLE PROVIDER: 
 
1. I agree that all controlled substances that I take will be prescribed only by my provider (or his/her covering provider) under this Agreement. This agreement does not prevent me from seeking emergency medical treatment or receiving pain management related to a surgery. PROTECTING MEDICATIONS: 
 
1. I am responsible for keeping my prescriptions and medications in a safe and secure place including safeguarding them from loss or theft. I understand that lost, stolen or damaged/destroyed prescriptions or medications will not be replaced. Name:.Luna Mojica CPI:[birth year] MR #:[id number] Provider Name:Rosita Gaffney, NP  
*[id number]* BSMG-491 (5/16) Page 3 of 5 Initial WebNotes PRESCRIPTION RENEWALS/REFILLS: 
 
1. I will follow my controlled substance medication schedule as prescribed by my provider. 2. I understand and agree that I will make any requests for renewals or refills of my prescriptions only at the time of an office visit or during my providers regular office hours subject to the prescription refill requirements of the individual practice. 3. I understand that my provider may not call in prescriptions for controlled substances to my pharmacy. 4. I understand that my provider may adjust or discontinue these medications as deemed appropriate for my medical treatment plan. This Agreement does not guarantee the prescription of controlled medications. 5. I agree that if my medications are adjusted or discontinued, I will properly dispose of any remaining medications. I understand that I will be required to dispose of any remaining controlled medications prior to being provided with any prescriptions for other controlled medications.  
 
 
1. I authorize my provider and my pharmacy to cooperate fully with any local, state, or federal law enforcement agency in the investigation of any possible misuse, sale, or other diversion of my controlled substance prescriptions or medications. RISKS: 
 
 
1. I understand that if I do not adhere to this Agreement in any way, my provider may change my prescriptions, stop prescribing controlled substances or end our provider-patient relationship. 2. If my provider decides to stop prescribing medication, or decides to end our provider-patient relationship,my provider may require that I taper my medications slowly. If necessary, my provider may also provide a prescription for other medications to treat my withdrawal symptoms. UNDERSTANDING THIS AGREEMENT: 
 
I understand that my provider may adjust or stop my prescriptions for controlled substances based on my medical condition and my treatment plan. I understand that this Agreement does not guarantee that I will be prescribed medications or controlled substances. I understand that controlled substances may be just one part 
of my treatment plan.  
 
My initial on each page and my signature below shows that I have read each page of this Agreement, I have had an opportunity to ask questions, and all of my questions have been answered to my satisfaction by my provider. By signing below, I agree to comply with this Agreement, and I understand that if I do not follow the Agreements listed above, my provider may stop 
 
 
 
_________________________________________  Date/Time 6/25/2018 5:00 PM   
             (Patient Signature)

## 2018-06-25 NOTE — PROGRESS NOTES
Chief Complaint   Patient presents with    Medication Refill     1. Have you been to the ER, urgent care clinic since your last visit? Hospitalized since your last visit? NO    2. Have you seen or consulted any other health care providers outside of the [facility] since your last visit? Include any pap smears or colon screening.  NO

## 2018-06-25 NOTE — PATIENT INSTRUCTIONS
Back Pain: Care Instructions  Your Care Instructions    Back pain has many possible causes. It is often related to problems with muscles and ligaments of the back. It may also be related to problems with the nerves, discs, or bones of the back. Moving, lifting, standing, sitting, or sleeping in an awkward way can strain the back. Sometimes you don't notice the injury until later. Arthritis is another common cause of back pain. Although it may hurt a lot, back pain usually improves on its own within several weeks. Most people recover in 12 weeks or less. Using good home treatment and being careful not to stress your back can help you feel better sooner. Follow-up care is a key part of your treatment and safety. Be sure to make and go to all appointments, and call your doctor if you are having problems. It's also a good idea to know your test results and keep a list of the medicines you take. How can you care for yourself at home? · Sit or lie in positions that are most comfortable and reduce your pain. Try one of these positions when you lie down:  ¨ Lie on your back with your knees bent and supported by large pillows. ¨ Lie on the floor with your legs on the seat of a sofa or chair. Sherle Plants on your side with your knees and hips bent and a pillow between your legs. ¨ Lie on your stomach if it does not make pain worse. · Do not sit up in bed, and avoid soft couches and twisted positions. Bed rest can help relieve pain at first, but it delays healing. Avoid bed rest after the first day of back pain. · Change positions every 30 minutes. If you must sit for long periods of time, take breaks from sitting. Get up and walk around, or lie in a comfortable position. · Try using a heating pad on a low or medium setting for 15 to 20 minutes every 2 or 3 hours. Try a warm shower in place of one session with the heating pad. · You can also try an ice pack for 10 to 15 minutes every 2 to 3 hours.  Put a thin cloth between the ice pack and your skin. · Take pain medicines exactly as directed. ¨ If the doctor gave you a prescription medicine for pain, take it as prescribed. ¨ If you are not taking a prescription pain medicine, ask your doctor if you can take an over-the-counter medicine. · Take short walks several times a day. You can start with 5 to 10 minutes, 3 or 4 times a day, and work up to longer walks. Walk on level surfaces and avoid hills and stairs until your back is better. · Return to work and other activities as soon as you can. Continued rest without activity is usually not good for your back. · To prevent future back pain, do exercises to stretch and strengthen your back and stomach. Learn how to use good posture, safe lifting techniques, and proper body mechanics. When should you call for help? Call your doctor now or seek immediate medical care if:  ? · You have new or worsening numbness in your legs. ? · You have new or worsening weakness in your legs. (This could make it hard to stand up.)   ? · You lose control of your bladder or bowels. ? Watch closely for changes in your health, and be sure to contact your doctor if:  ? · Your pain gets worse. ? · You are not getting better after 2 weeks. Where can you learn more? Go to http://vilma-francois.info/. Enter S400 in the search box to learn more about \"Back Pain: Care Instructions. \"  Current as of: March 21, 2017  Content Version: 11.4  © 3850-7665 [employer]. Care instructions adapted under license by [employer] (which disclaims liability or warranty for this information). If you have questions about a medical condition or this instruction, always ask your healthcare professional. [place] [address] any warranty or liability for your use of this information.        Neck Pain: Care Instructions  Your Care Instructions    You can have neck pain anywhere from the bottom of your head to the top of your shoulders. It can spread to the upper back or arms. Injuries, painting a ceiling, sleeping with your neck twisted, staying in one position for too long, and many other activities can cause neck pain. Most neck pain gets better with home care. Your doctor may recommend medicine to relieve pain or relax your muscles. He or she may suggest exercise and physical therapy to increase flexibility and relieve stress. You may need to wear a special (cervical) collar to support your neck for a day or two. Follow-up care is a key part of your treatment and safety. Be sure to make and go to all appointments, and call your doctor if you are having problems. It's also a good idea to know your test results and keep a list of the medicines you take. How can you care for yourself at home? · Try using a heating pad on a low or medium setting for 15 to 20 minutes every 2 or 3 hours. Try a warm shower in place of one session with the heating pad. · You can also try an ice pack for 10 to 15 minutes every 2 to 3 hours. Put a thin cloth between the ice and your skin. · Take pain medicines exactly as directed. ¨ If the doctor gave you a prescription medicine for pain, take it as prescribed. ¨ If you are not taking a prescription pain medicine, ask your doctor if you can take an over-the-counter medicine. · If your doctor recommends a cervical collar, wear it exactly as directed. When should you call for help? Call your doctor now or seek immediate medical care if:  ? · You have new or worsening numbness in your arms, buttocks or legs. ? · You have new or worsening weakness in your arms or legs. (This could make it hard to stand up.)   ? · You lose control of your bladder or bowels. ? Watch closely for changes in your health, and be sure to contact your doctor if:  ? · Your neck pain is getting worse. ? · You are not getting better after 1 week. ? · You do not get better as expected.    Where can you learn more?  Go to http://vilma-francois.info/. Enter [id number] in the search box to learn more about \"Neck Pain: Care Instructions. \"  Current as of: March 21, 2017  Content Version: 11.4  © 7204-6933 [employer]. Care instructions adapted under license by [employer] (which disclaims liability or warranty for this information). If you have questions about a medical condition or this instruction, always ask your healthcare professional. [address] any warranty or liability for your use of this information.

## 2018-07-10 RX ORDER — TOPIRAMATE 50 MG/1
50 TABLET, FILM COATED ORAL DAILY
Qty: 30 TAB | Refills: 3 | Status: SHIPPED | OUTPATIENT
Start: 2018-07-10 | End: 2019-01-11

## 2018-08-17 ENCOUNTER — OFFICE VISIT (OUTPATIENT)
Dept: FAMILY MEDICINE CLINIC | Age: 28
End: 2018-08-17

## 2018-08-17 VITALS
WEIGHT: 195 LBS | BODY MASS INDEX: 31.34 KG/M2 | HEIGHT: 66 IN | SYSTOLIC BLOOD PRESSURE: 130 MMHG | DIASTOLIC BLOOD PRESSURE: 94 MMHG | OXYGEN SATURATION: 99 % | TEMPERATURE: 97.4 F | RESPIRATION RATE: 18 BRPM | HEART RATE: 75 BPM

## 2018-08-17 DIAGNOSIS — S13.4XXD WHIPLASH INJURY TO NECK, SUBSEQUENT ENCOUNTER: ICD-10-CM

## 2018-08-17 DIAGNOSIS — M54.2 CERVICALGIA: ICD-10-CM

## 2018-08-17 DIAGNOSIS — G43.011 INTRACTABLE MIGRAINE WITHOUT AURA AND WITH STATUS MIGRAINOSUS: ICD-10-CM
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
## 2018-08-17 DIAGNOSIS — M51.36 LUMBAR DEGENERATIVE DISC DISEASE: Primary | ICD-10-CM

## 2018-08-17 DIAGNOSIS — M54.16 LUMBAR RADICULOPATHY: ICD-10-CM

## 2018-08-17 DIAGNOSIS — M54.16 LUMBAR BACK PAIN WITH RADICULOPATHY AFFECTING LEFT LOWER EXTREMITY: ICD-10-CM

## 2018-08-17 RX ORDER — HYDROCODONE BITARTRATE AND ACETAMINOPHEN 7.5; 325 MG/1; MG/1
1 TABLET ORAL
Qty: 60 TAB | Refills: 0 | Status: SHIPPED | OUTPATIENT
Start: 2018-08-17 | End: 2019-01-11

## 2018-08-17 RX ORDER — GABAPENTIN 100 MG/1
CAPSULE ORAL
COMMUNITY
Start: 2018-08-03 | End: 2019-01-11
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
## 2018-08-17 NOTE — PROGRESS NOTES
Lennox Likes  Identified pt with two pt identifiers(name and ). Chief Complaint   Patient presents with   [facility]     Rm:6 referral to PT, MRI revealed lumbar degenerative disk disease, pt states next , first steroid spinal injection. pt is on desk duty and back is still hurting 9/10 on ps. If patient sits, stands or lays down pt is in pain and can not stand. 1. Have you been to the ER, urgent care clinic since your last visit? Hospitalized since your last visit? NO    2. Have you seen or consulted any other health care providers outside of the [address] [place] [address] since your last visit? Include any pap smears or colon screening. NO    3. In the event something were to happen to you and you were unable to speak on your behalf, do you have an Advance Directive/ Living Will in place, stating your wishes? NO    If yes, do we have a copy on file NO    If no, would you like information     Today's provider has been notified of reason for visit, vitals and flowsheets obtained on patients. Reviewed record In preparation for visit, huddled with provider and have obtained necessary documentation      There are no preventive care reminders to display for this patient.     [unfilled]  Wt Readings from Last 3 Encounters:   18 195 lb (88.5 kg)   18 189 lb 14.4 oz (86.1 kg)   18 189 lb (85.7 kg)     Temp Readings from Last 3 Encounters:   18 97.4 °F (36.3 °C) (Temporal)   18 97.8 °F (36.6 °C) (Oral)   18 97.9 °F (36.6 °C) (Temporal)     BP Readings from Last 3 Encounters:   18 (!) 130/94   18 108/77   18 114/85     Pulse Readings from Last 3 Encounters:   18 75   18 87   18 78     Vitals:    18 1652   BP: (!) 130/94   Pulse: 75   Resp: 18   Temp: 97.4 °F (36.3 °C)   TempSrc: Temporal   SpO2: 99%   Weight: 195 lb (88.5 kg)   Height: 5' 6\" (1.676 m)   PainSc:   8   PainLoc: Back         Learning Assessment:  :     Learning Assessment 2/5/2018 1/15/2018   PRIMARY LEARNER Patient Patient   HIGHEST LEVEL OF EDUCATION - PRIMARY LEARNER  - 2 YEARS OF COLLEGE   BARRIERS PRIMARY LEARNER - NONE   CO-LEARNER CAREGIVER - No   PRIMARY LANGUAGE ENGLISH ENGLISH   LEARNER PREFERENCE PRIMARY DEMONSTRATION LISTENING   ANSWERED BY PATIENT  Patient   RELATIONSHIP SELF SELF       Depression Screening:  :     PHQ over the last two weeks 6/25/2018   Little interest or pleasure in doing things Not at all   Feeling down, depressed, irritable, or hopeless Not at all   Total Score PHQ 2 0       Fall Risk Assessment:  :     Fall Risk Assessment, last 12 mths 1/15/2018   Able to walk? Yes   Fall in past 12 months? Yes   Fall with injury? Yes   Number of falls in past 12 months 1   Fall Risk Score 2       Abuse Screening:  :     Abuse Screening Questionnaire 1/15/2018   Do you ever feel afraid of your partner? N   Are you in a relationship with someone who physically or mentally threatens you? N   Is it safe for you to go home? Y       ADL Screening:  :     ADL Assessment 5/22/2018   Feeding yourself No Help Needed   Getting from bed to chair No Help Needed   Getting dressed No Help Needed   Bathing or showering No Help Needed   Walk across the room (includes cane/walker) No Help Needed   Using the telphone No Help Needed   Taking your medications No Help Needed   Preparing meals No Help Needed   Managing money (expenses/bills) No Help Needed   Moderately strenuous housework (laundry) No Help Needed   Shopping for personal items (toiletries/medicines) No Help Needed   Shopping for groceries No Help Needed   Driving No Help Needed   Climbing a flight of stairs No Help Needed   Getting to places beyond walking distances No Help Needed         Medication reconciliation up to date and corrected with patient at this time.
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
## 2018-08-23 ENCOUNTER — OFFICE VISIT (OUTPATIENT)
Dept: URGENT CARE | Age: 28
End: 2018-08-23

## 2018-08-23 VITALS
WEIGHT: 194 LBS | OXYGEN SATURATION: 99 % | HEIGHT: 66 IN | BODY MASS INDEX: 31.18 KG/M2 | DIASTOLIC BLOOD PRESSURE: 68 MMHG | RESPIRATION RATE: 16 BRPM | SYSTOLIC BLOOD PRESSURE: 100 MMHG | TEMPERATURE: 97.8 F | HEART RATE: 73 BPM

## 2018-08-23 DIAGNOSIS — B96.89 ACUTE BACTERIAL SINUSITIS: Primary | ICD-10-CM

## 2018-08-23 DIAGNOSIS — J01.90 ACUTE BACTERIAL SINUSITIS: Primary | ICD-10-CM

## 2018-08-23 DIAGNOSIS — J30.9 ALLERGIC RHINITIS, UNSPECIFIED SEASONALITY, UNSPECIFIED TRIGGER: ICD-10-CM

## 2018-08-23 RX ORDER — DOXYCYCLINE 100 MG/1
100 CAPSULE ORAL 2 TIMES DAILY
Qty: 14 CAP | Refills: 0 | Status: SHIPPED | OUTPATIENT
Start: 2018-08-23 | End: 2018-08-30

## 2018-08-23 RX ORDER — TRIAMCINOLONE ACETONIDE 55 UG/1
2 SPRAY, METERED NASAL DAILY
Qty: 1 BOTTLE | Refills: 0 | Status: SHIPPED | OUTPATIENT
Start: 2018-08-23 | End: 2019-02-19 | Stop reason: ALTCHOICE

## 2018-08-23 NOTE — PATIENT INSTRUCTIONS
Follow up with PCP without improvement over next 7 days sooner/immediately for new or worsening       Sinusitis: Care Instructions  Your Care Instructions    Sinusitis is an infection of the lining of the sinus cavities in your head. Sinusitis often follows a cold. It causes pain and pressure in your head and face. In most cases, sinusitis gets better on its own in 1 to 2 weeks. But some mild symptoms may last for several weeks. Sometimes antibiotics are needed. Follow-up care is a key part of your treatment and safety. Be sure to make and go to all appointments, and call your doctor if you are having problems. It's also a good idea to know your test results and keep a list of the medicines you take. How can you care for yourself at home? · Take an over-the-counter pain medicine, such as acetaminophen (Tylenol), ibuprofen (Advil, Motrin), or naproxen (Aleve). Read and follow all instructions on the label. · If the doctor prescribed antibiotics, take them as directed. Do not stop taking them just because you feel better. You need to take the full course of antibiotics. · Be careful when taking over-the-counter cold or flu medicines and Tylenol at the same time. Many of these medicines have acetaminophen, which is Tylenol. Read the labels to make sure that you are not taking more than the recommended dose. Too much acetaminophen (Tylenol) can be harmful. · Breathe warm, moist air from a steamy shower, a hot bath, or a sink filled with hot water. Avoid cold, dry air. Using a humidifier in your home may help. Follow the directions for cleaning the machine. · Use saline (saltwater) nasal washes to help keep your nasal passages open and wash out mucus and bacteria. You can buy saline nose drops at a grocery store or drugstore. Or you can make your own at home by adding 1 teaspoon of salt and 1 teaspoon of baking soda to 2 cups of distilled water.  If you make your own, fill a bulb syringe with the solution, insert the tip into your nostril, and squeeze gently. Burkett Bees your nose. · Put a hot, wet towel or a warm gel pack on your face 3 or 4 times a day for 5 to 10 minutes each time. · Try a decongestant nasal spray like oxymetazoline (Afrin). Do not use it for more than 3 days in a row. Using it for more than 3 days can make your congestion worse. When should you call for help? Call your doctor now or seek immediate medical care if:    · You have new or worse swelling or redness in your face or around your eyes.     · You have a new or higher fever.    Watch closely for changes in your health, and be sure to contact your doctor if:    · You have new or worse facial pain.     · The mucus from your nose becomes thicker (like pus) or has new blood in it.     · You are not getting better as expected. Where can you learn more? Go to http://vilma-francois.info/. Enter J078 in the search box to learn more about \"Sinusitis: Care Instructions. \"  Current as of: May 12, 2017  Content Version: 11.7  © 2869-1497 [employer]. Care instructions adapted under license by [employer] (which disclaims liability or warranty for this information). If you have questions about a medical condition or this instruction, always ask your healthcare professional. [address] any warranty or liability for your use of this information.

## 2018-08-23 NOTE — MR AVS SNAPSHOT
[address] [facility] [address] 
[phone] Patient: Mignon Gu MRN: [id number] HBY:[birth year] Visit Information Date & Time Provider Department Dept. Phone Encounter #  
 8/23/2018  6:30 PM [facility] [phone] [id number] Upcoming Health Maintenance Date Due Influenza Age 5 to Adult 9/30/2018* PAP AKA CERVICAL CYTOLOGY 10/17/2020 DTaP/Tdap/Td series (2 - Td) 1/15/2028 *Topic was postponed. The date shown is not the original due date. Allergies as of 8/23/2018  Review Complete On: 8/23/2018 By: Oh Cole RN Severity Noted Reaction Type Reactions Ciprofloxacin  05/05/2010    Hives Pcn [Penicillins]  05/05/2010    Hives Current Immunizations  Reviewed on 5/22/2018 No immunizations on file. Not reviewed this visit You Were Diagnosed With   
  
 Codes Comments Acute bacterial sinusitis    -  Primary ICD-10-CM: J01.90, B96.89 
ICD-9-CM: 461.9 Allergic rhinitis, unspecified seasonality, unspecified trigger     ICD-10-CM: J30.9 ICD-9-CM: 477.9 Vitals BP Pulse Temp Resp Height(growth percentile) Weight(growth percentile) 100/68 73 97.8 °F (36.6 °C) 16 5' 6\" (1.676 m) 194 lb (88 kg) LMP SpO2 BMI OB Status Smoking Status 08/09/2018 99% 31.31 kg/m2 Having regular periods Never Smoker BMI and BSA Data Body Mass Index Body Surface Area  
 31.31 kg/m 2 2.02 m 2 Preferred Pharmacy Pharmacy Name Phone [facility] [address] [place] [address] [phone] Your Updated Medication List  
  
   
This list is accurate as of 8/23/18  7:18 PM.  Always use your most recent med list.  
  
  
  
  
 cyclobenzaprine 10 mg tablet Commonly known as:  FLEXERIL Take 1 Tab by mouth three (3) times daily as needed for Muscle Spasm(s). doxycycline 100 mg capsule Commonly known as:  VIBRAMYCIN Take 1 Cap by mouth two (2) times a day for 7 days. gabapentin 100 mg capsule Commonly known as:  NEURONTIN Take 1 tablet increasing to 3 slowly at bedtime only. HYDROcodone-acetaminophen 7.5-325 mg per tablet Commonly known as:  Shahla Arts Take 1 Tab by mouth every four (4) hours as needed for Pain. Max Daily Amount: 6 Tabs. naloxone 4 mg/actuation nasal spray Commonly known as:  [employer] Use 1 spray intranasally into 1 nostril. Use a new Narcan nasal spray for subsequent doses and administer into alternating nostrils. May repeat every 2 to 3 minutes as needed. rizatriptan 10 mg tablet Commonly known as:  Kathlee Rise Take 1 Tab by mouth once as needed for Migraine for up to 1 dose. May repeat in 2 hours if needed  
  
 topiramate 50 mg tablet Commonly known as:  TOPAMAX Take 1 Tab by mouth daily. triamcinolone 55 mcg nasal inhaler Commonly known as:  NASACORT AQ  
2 Sprays by Both Nostrils route daily. Prescriptions Sent to Pharmacy Refills  
 triamcinolone (NASACORT AQ) 55 mcg nasal inhaler 0 Si Sprays by Both Nostrils route daily. Class: Normal  
 [facility] [address] [place] [address] Ph #: [phone] Route: Both Nostrils  
 doxycycline (VIBRAMYCIN) 100 mg capsule 0 Sig: Take 1 Cap by mouth two (2) times a day for 7 days. Class: Normal  
 Pharmacy: [facility] [address] [place] [address] Ph #: [phone] Route: Oral  
  
Patient Instructions Follow up with PCP without improvement over next 7 days sooner/immediately for new or worsening Sinusitis: Care Instructions Your Care Instructions Sinusitis is an infection of the lining of the sinus cavities in your head. Sinusitis often follows a cold. It causes pain and pressure in your head and face. In most cases, sinusitis gets better on its own in 1 to 2 weeks.  But some mild symptoms may last for several weeks. Sometimes antibiotics are needed. Follow-up care is a key part of your treatment and safety. Be sure to make and go to all appointments, and call your doctor if you are having problems. It's also a good idea to know your test results and keep a list of the medicines you take. How can you care for yourself at home? · Take an over-the-counter pain medicine, such as acetaminophen (Tylenol), ibuprofen (Advil, Motrin), or naproxen (Aleve). Read and follow all instructions on the label. · If the doctor prescribed antibiotics, take them as directed. Do not stop taking them just because you feel better. You need to take the full course of antibiotics. · Be careful when taking over-the-counter cold or flu medicines and Tylenol at the same time. Many of these medicines have acetaminophen, which is Tylenol. Read the labels to make sure that you are not taking more than the recommended dose. Too much acetaminophen (Tylenol) can be harmful. · Breathe warm, moist air from a steamy shower, a hot bath, or a sink filled with hot water. Avoid cold, dry air. Using a humidifier in your home may help. Follow the directions for cleaning the machine. · Use saline (saltwater) nasal washes to help keep your nasal passages open and wash out mucus and bacteria. You can buy saline nose drops at a grocery store or drugstore. Or you can make your own at home by adding 1 teaspoon of salt and 1 teaspoon of baking soda to 2 cups of distilled water. If you make your own, fill a bulb syringe with the solution, insert the tip into your nostril, and squeeze gently. Yoel Abbey your nose. · Put a hot, wet towel or a warm gel pack on your face 3 or 4 times a day for 5 to 10 minutes each time. · Try a decongestant nasal spray like oxymetazoline (Afrin). Do not use it for more than 3 days in a row. Using it for more than 3 days can make your congestion worse. When should you call for help? Call your doctor now or seek immediate medical care if: 
  · You have new or worse swelling or redness in your face or around your eyes.  
  · You have a new or higher fever.  
 Watch closely for changes in your health, and be sure to contact your doctor if: 
  · You have new or worse facial pain.  
  · The mucus from your nose becomes thicker (like pus) or has new blood in it.  
  · You are not getting better as expected. Where can you learn more? Go to http://vilma-francois.info/. Enter N724 in the search box to learn more about \"Sinusitis: Care Instructions. \" Current as of: May 12, 2017 Content Version: 11.7 © 9713-0195 [employer]. Care instructions adapted under license by [employer] (which disclaims liability or warranty for this information). If you have questions about a medical condition or this instruction, always ask your healthcare professional. [address] any warranty or liability for your use of this information. Introducing [facility]! Dear Caty Quesada: Thank you for requesting a [employer] account. Our records indicate that you already have an active [employer] account. You can access your account anytime at [email or web address]. [employer] Knowledge/[employer] Did you know that you can access your hospital and ER discharge instructions at any time in [employer]? You can also review all of your test results from your hospital stay or ER visit. Additional Information If you have questions, please visit the Frequently Asked Questions section of the [employer] website at [email or web address] [email or web address]/. Remember, [employer] is NOT to be used for urgent needs. For medical emergencies, dial 911. Now available from your iPhone and Android! Please provide this summary of care documentation to your next provider. Your primary care clinician is listed as Ilan Rodriguez.  If you have any questions after today's visit, please call [phone].

## 2018-08-25 NOTE — PROGRESS NOTES
HPI Comments:   1 month of nasal congestion, sinus pressure, post nasal drip and cough. Has tried OTC meds without any relief. Symptoms constant without any aggravating or alleviating factors. Overall worsening. Specifically denies: fever, chills, nausea, vomiting, pleuritic chest pain, wheezing. Hx significant for migraine, sleep disturbance. Patient is a 25 y.o. female presenting with cold symptoms. Cold Symptoms          Past Medical History:   Diagnosis Date    Headache     Hypertension     Lumbar degenerative disc disease 2018    Other ill-defined conditions(799.89)     kidney infections    Other ill-defined conditions(799.89)     Migraines    Ovarian cyst     Ruptured ovarian cyst 2018        Past Surgical History:   Procedure Laterality Date    HX ORTHOPAEDIC      pelvic bone fx         Family History   Problem Relation Age of Onset    No Known Problems Mother     No Known Problems Father     No Known Problems Brother         Social History     Social History    Marital status: SINGLE     Spouse name: N/A    Number of children: N/A    Years of education: N/A     Occupational History    Not on file. Social History Main Topics    Smoking status: Never Smoker    Smokeless tobacco: Never Used    Alcohol use No    Drug use: No    Sexual activity: Yes     Birth control/ protection: Injection     Other Topics Concern    Not on file     Social History Narrative                ALLERGIES: Ciprofloxacin and Pcn [penicillins]    Review of Systems   All other systems reviewed and are negative. Vitals:    08/23/18 1848   BP: 100/68   Pulse: 73   Resp: 16   Temp: 97.8 °F (36.6 °C)   SpO2: 99%   Weight: 194 lb (88 kg)   Height: 5' 6\" (1.676 m)       Physical Exam   Constitutional: She is oriented to person, place, and time. No distress. Non-toxic appearing, well hydrated   HENT:   Swollen nasal turbinates with yellow mucus in nasal passages bilat.  TMs normal. Thick post nasal drip down back of pharynx wall. Eyes: Conjunctivae and EOM are normal. Pupils are equal, round, and reactive to light. No scleral icterus. Neck: Normal range of motion. Neck supple. Cardiovascular: Normal rate, regular rhythm and normal heart sounds. Exam reveals no gallop and no friction rub. No murmur heard. Pulmonary/Chest: Effort normal and breath sounds normal. No respiratory distress. She has no wheezes. She has no rales. Good air movement throughout. Lymphadenopathy:     She has no cervical adenopathy. Neurological: She is alert and oriented to person, place, and time. Skin: Skin is warm and dry. No rash noted. She is not diaphoretic. No erythema. No pallor. Psychiatric: She has a normal mood and affect. Her behavior is normal. Thought content normal.   Nursing note and vitals reviewed. MDM     Differential Diagnosis; Clinical Impression; Plan:       CLINICAL IMPRESSION:  (J01.90,  B96.89) Acute bacterial sinusitis  (primary encounter diagnosis)  (J30.9) Allergic rhinitis, unspecified seasonality, unspecified trigger    Orders Placed This Encounter  RX      triamcinolone (NASACORT AQ) 55 mcg nasal inhaler      doxycycline (VIBRAMYCIN) 100 mg capsule      Plan:  1. See above orders  2. May try nasal saline sprays  3. Review handouts      We have reviewed concerning signs/symptoms, normal vs abnormal progression of medical condition and when to seek immediate medical attention. Schedule with PCP or Urgent Care immediately for worsening or new symptoms. See your PCP if there is not at least some improvement in symptoms within the next 5-7 days  You should see your PCP for updates on your routine health maintenance.        Procedures

## 2018-09-28 ENCOUNTER — TELEPHONE (OUTPATIENT)
Dept: NEUROLOGY | Age: 28
End: 2018-09-28

## 2018-10-01 ENCOUNTER — TELEPHONE (OUTPATIENT)
Dept: NEUROLOGY | Age: 28
End: 2018-10-01

## 2018-10-22 RX ORDER — RIZATRIPTAN BENZOATE 10 MG/1
TABLET ORAL
Qty: 9 TAB | Refills: 3 | Status: SHIPPED | OUTPATIENT
Start: 2018-10-22 | End: 2018-11-18

## 2018-11-18 RX ORDER — RIZATRIPTAN BENZOATE 10 MG/1
TABLET ORAL
Qty: 9 TAB | Refills: 3 | Status: SHIPPED | OUTPATIENT
Start: 2018-11-18 | End: 2021-09-18

## 2019-01-11 ENCOUNTER — OFFICE VISIT (OUTPATIENT)
Dept: URGENT CARE | Age: 29
End: 2019-01-11

## 2019-01-11 VITALS
HEIGHT: 66 IN | BODY MASS INDEX: 31.82 KG/M2 | HEART RATE: 85 BPM | DIASTOLIC BLOOD PRESSURE: 83 MMHG | OXYGEN SATURATION: 98 % | TEMPERATURE: 97.7 F | WEIGHT: 198 LBS | SYSTOLIC BLOOD PRESSURE: 124 MMHG | RESPIRATION RATE: 16 BRPM

## 2019-01-11 DIAGNOSIS — R68.89 FLU-LIKE SYMPTOMS: Primary | ICD-10-CM

## 2019-01-11 DIAGNOSIS — J01.90 ACUTE SINUSITIS, RECURRENCE NOT SPECIFIED, UNSPECIFIED LOCATION: ICD-10-CM

## 2019-01-11 LAB
FLUAV+FLUBV AG NOSE QL IA.RAPID: NEGATIVE POS/NEG
FLUAV+FLUBV AG NOSE QL IA.RAPID: NEGATIVE POS/NEG
VALID INTERNAL CONTROL?: YES

## 2019-01-11 RX ORDER — DOXYCYCLINE 100 MG/1
100 CAPSULE ORAL 2 TIMES DAILY
Qty: 14 CAP | Refills: 0 | Status: SHIPPED | OUTPATIENT
Start: 2019-01-11 | End: 2019-01-18

## 2019-01-11 NOTE — PATIENT INSTRUCTIONS
Follow up with PCP without improvement over next 5-7 days sooner/immediately for new or worsening  Flu test negative today       Sinusitis: Care Instructions  Your Care Instructions    Sinusitis is an infection of the lining of the sinus cavities in your head. Sinusitis often follows a cold. It causes pain and pressure in your head and face. In most cases, sinusitis gets better on its own in 1 to 2 weeks. But some mild symptoms may last for several weeks. Sometimes antibiotics are needed. Follow-up care is a key part of your treatment and safety. Be sure to make and go to all appointments, and call your doctor if you are having problems. It's also a good idea to know your test results and keep a list of the medicines you take. How can you care for yourself at home? · Take an over-the-counter pain medicine, such as acetaminophen (Tylenol), ibuprofen (Advil, Motrin), or naproxen (Aleve). Read and follow all instructions on the label. · If the doctor prescribed antibiotics, take them as directed. Do not stop taking them just because you feel better. You need to take the full course of antibiotics. · Be careful when taking over-the-counter cold or flu medicines and Tylenol at the same time. Many of these medicines have acetaminophen, which is Tylenol. Read the labels to make sure that you are not taking more than the recommended dose. Too much acetaminophen (Tylenol) can be harmful. · Breathe warm, moist air from a steamy shower, a hot bath, or a sink filled with hot water. Avoid cold, dry air. Using a humidifier in your home may help. Follow the directions for cleaning the machine. · Use saline (saltwater) nasal washes to help keep your nasal passages open and wash out mucus and bacteria. You can buy saline nose drops at a grocery store or drugstore. Or you can make your own at home by adding 1 teaspoon of salt and 1 teaspoon of baking soda to 2 cups of distilled water.  If you make your own, fill a bulb syringe with the solution, insert the tip into your nostril, and squeeze gently. Alla Rummage your nose. · Put a hot, wet towel or a warm gel pack on your face 3 or 4 times a day for 5 to 10 minutes each time. · Try a decongestant nasal spray like oxymetazoline (Afrin). Do not use it for more than 3 days in a row. Using it for more than 3 days can make your congestion worse. When should you call for help? Call your doctor now or seek immediate medical care if:    · You have new or worse swelling or redness in your face or around your eyes.     · You have a new or higher fever.    Watch closely for changes in your health, and be sure to contact your doctor if:    · You have new or worse facial pain.     · The mucus from your nose becomes thicker (like pus) or has new blood in it.     · You are not getting better as expected. Where can you learn more? Go to http://vilma-francois.info/. Enter V608 in the search box to learn more about \"Sinusitis: Care Instructions. \"  Current as of: March 28, 2018  Content Version: 11.8  © 8375-4248 [email or web address]. Care instructions adapted under license by [employer] (which disclaims liability or warranty for this information). If you have questions about a medical condition or this instruction, always ask your healthcare professional. [place] [address] any warranty or liability for your use of this information.

## 2019-01-14 NOTE — PROGRESS NOTES
Patient presents with:  Cold Symptoms: Pt c/o fever, body aches, chills, sore throat, sinus pain and cough    Here thinks she may have a sinus infection  Had cold like symptoms over past 2 weeks. Felt improved for a couple days then starting a couple days ago felt more achy, tired, feverish with return of nasal congestion, sinus pressure and occasional cough. She has tried OTC meds without relief. No other associated symptoms. Drinking plenty of fluids. No known sick contacts. Specifically denies: SOB, pleuritic pain, wheezing, nausea, vomiting, dizziness.                   Past Medical History:   Diagnosis Date    Headache     Hypertension     Lumbar degenerative disc disease 2018    Other ill-defined conditions(799.89)     kidney infections    Other ill-defined conditions(799.89)     Migraines    Ovarian cyst     Ruptured ovarian cyst 2018        Past Surgical History:   Procedure Laterality Date    HX ORTHOPAEDIC      pelvic bone fx         Family History   Problem Relation Age of Onset    No Known Problems Mother     No Known Problems Father     No Known Problems Brother         Social History     Socioeconomic History    Marital status: SINGLE     Spouse name: Not on file    Number of children: Not on file    Years of education: Not on file    Highest education level: Not on file   Social Needs    Financial resource strain: Not on file    Food insecurity - worry: Not on file    Food insecurity - inability: Not on file   [employer] needs - medical: Not on file   [employer] needs - non-medical: Not on file   Occupational History    Not on file   Tobacco Use    Smoking status: Never Smoker    Smokeless tobacco: Never Used   Substance and Sexual Activity    Alcohol use: No    Drug use: No    Sexual activity: Yes     Birth control/protection: Injection   Other Topics Concern    Not on file   Social History Narrative    Not on file                ALLERGIES: Ciprofloxacin and Pcn [penicillins]    Review of Systems   Gastrointestinal: Negative for nausea and vomiting. Musculoskeletal: Negative for neck pain and neck stiffness. Neurological: Negative for dizziness. All other systems reviewed and are negative. Vitals:    01/11/19 1705   BP: 124/83   Pulse: 85   Resp: 16   Temp: 97.7 °F (36.5 °C)   SpO2: 98%   Weight: 198 lb (89.8 kg)   Height: 5' 6\" (1.676 m)       Physical Exam   Constitutional: She is oriented to person, place, and time. No distress. Non-toxic appearing, well hydrated   HENT:   Decreased nasal patency bilat with swollen nasal turbinates and whitish yellow nasal discharge. OP mild erythema without swelling or exudate. Uvula midline. TMs normal.   Eyes: EOM are normal. Pupils are equal, round, and reactive to light. No scleral icterus. Neck: Normal range of motion. Neck supple. Cardiovascular: Normal rate, regular rhythm and normal heart sounds. Exam reveals no gallop and no friction rub. No murmur heard. Pulmonary/Chest: Effort normal and breath sounds normal. No respiratory distress. She has no wheezes. She has no rales. Lymphadenopathy:     She has no cervical adenopathy. Neurological: She is alert and oriented to person, place, and time. No cranial nerve deficit. Skin: Skin is warm and dry. No rash noted. She is not diaphoretic. No erythema. No pallor. Psychiatric: She has a normal mood and affect. Her behavior is normal. Thought content normal.   Nursing note and vitals reviewed. MDM     Differential Diagnosis; Clinical Impression; Plan:       CLINICAL IMPRESSION:  (R68.89) Flu-like symptoms  (primary encounter diagnosis)  (J01.90) Acute sinusitis, recurrence not specified, unspecified location    Orders Placed This Encounter      AMB POC MAGALIS INFLUENZA A/B TEST    RX      doxycycline (VIBRAMYCIN) 100 mg capsule      oxymetazoline (AFRIN NO DRIP,OXYMETAZOLIN,) 0.05 % mist      Plan:  1.  Suspect new bacterial sinusitis given bi-phasic illness pattern. Rapid flu negative, cant exclude new viral illness as possible diagnosis. Will treat with doxycycline. 2. Afrin, soothing fluids, humidified air, OTC tylenol as directed for fever/body aches  3. Review handouts      We have reviewed concerning signs/symptoms, normal vs abnormal progression of medical condition and when to seek immediate medical attention. Schedule with PCP or Urgent Care immediately for worsening or new symptoms. See your PCP if there is not at least some improvement in symptoms within the next 5-7 days. You should see your PCP for updates on your routine health maintenance.            Results for orders placed or performed in visit on 01/11/19   AMB POC MAGALIS INFLUENZA A/B TEST   Result Value Ref Range    VALID INTERNAL CONTROL POC Yes     Influenza A Ag POC Negative Negative Pos/Neg    Influenza B Ag POC Negative Negative Pos/Neg       Procedures

## 2019-02-19 ENCOUNTER — OFFICE VISIT (OUTPATIENT)
Dept: FAMILY MEDICINE CLINIC | Age: 29
End: 2019-02-19

## 2019-02-19 VITALS
HEART RATE: 76 BPM | HEIGHT: 66 IN | WEIGHT: 184.5 LBS | DIASTOLIC BLOOD PRESSURE: 80 MMHG | RESPIRATION RATE: 20 BRPM | OXYGEN SATURATION: 97 % | SYSTOLIC BLOOD PRESSURE: 126 MMHG | TEMPERATURE: 97.7 F | BODY MASS INDEX: 29.65 KG/M2

## 2019-02-19 DIAGNOSIS — M54.16 LUMBAR BACK PAIN WITH RADICULOPATHY AFFECTING LEFT LOWER EXTREMITY: ICD-10-CM

## 2019-02-19 DIAGNOSIS — G43.011 INTRACTABLE MIGRAINE WITHOUT AURA AND WITH STATUS MIGRAINOSUS: Primary | ICD-10-CM
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
## 2019-02-19 PROBLEM — S13.4XXA WHIPLASH INJURY TO NECK: Status: RESOLVED | Noted: 2018-05-22 | Resolved: 2019-02-19

## 2019-02-19 RX ORDER — ONDANSETRON 8 MG/1
8 TABLET, ORALLY DISINTEGRATING ORAL
Qty: 30 TAB | Refills: 1 | Status: SHIPPED | OUTPATIENT
Start: 2019-02-19 | End: 2020-05-21

## 2019-02-19 RX ORDER — TOPIRAMATE 50 MG/1
50 TABLET, FILM COATED ORAL
COMMUNITY
Start: 2018-06-06 | End: 2020-08-25

## 2019-02-19 RX ORDER — BUTALBITAL, ACETAMINOPHEN AND CAFFEINE 50; 325; 40 MG/1; MG/1; MG/1
1 TABLET ORAL
Qty: 20 TAB | Refills: 0 | Status: SHIPPED | OUTPATIENT
Start: 2019-02-19 | End: 2019-10-29 | Stop reason: SDUPTHER

## 2019-02-19 RX ORDER — KETOROLAC TROMETHAMINE 30 MG/ML
60 INJECTION, SOLUTION INTRAMUSCULAR; INTRAVENOUS ONCE
Qty: 1 VIAL | Refills: 0
Start: 2019-02-19 | End: 2019-02-19

## 2019-02-19 NOTE — PROGRESS NOTES
Keyon Wiggins  Identified pt with two pt identifiers(name and ). Chief Complaint Patient presents with  
 Headache Rm 5  
 
 
1. Have you been to the ER, urgent care clinic since your last visit? Y Jaquan  Hospitalized since your last visit? N 
 
2. Have you seen or consulted any other health care providers outside of the [address] [place] [address] since your last visit? Include any pap smears or colon screening. Adilson Cody Advance Care Planning In the event something were to happen to you and you were unable to speak on your behalf, do you have an Advance Directive/ Living Will in place stating your wishes? NO If yes, do we have a copy on file NO If no, would you like information NO Medication reconciliation up to date and corrected with patient at this time. Today's provider has been notified of reason for visit, vitals and flowsheets obtained on patients. Reviewed record in preparation for visit, huddled with provider and have obtained necessary documentation. Patient tolerated Toradol 60 mg injection to L deltoid. Patient pain level has not decreased before leaving office. Patient instructed to go to  if she continues to have a migraine per provider. Health Maintenance Due Topic  Influenza Age 5 to Adult Wt Readings from Last 3 Encounters:  
19 184 lb 8 oz (83.7 kg) 19 198 lb (89.8 kg) 18 194 lb (88 kg) Temp Readings from Last 3 Encounters:  
19 97.7 °F (36.5 °C) (Oral) 19 97.7 °F (36.5 °C)  
18 97.8 °F (36.6 °C) BP Readings from Last 3 Encounters:  
19 126/80  
19 124/83  
18 100/68 Pulse Readings from Last 3 Encounters:  
19 76  
19 85  
18 73 Vitals:  
 19 1059 BP: 126/80 Pulse: 76 Resp: 20 Temp: 97.7 °F (36.5 °C) TempSrc: Oral  
SpO2: 97% Weight: 184 lb 8 oz (83.7 kg) Height: 5' 6\" (1.676 m) PainSc:   9 PainLoc: Head LMP: 2019 Learning Assessment: 
:  
 
Learning Assessment 2/5/2018 1/15/2018 PRIMARY LEARNER Patient Patient HIGHEST LEVEL OF EDUCATION - PRIMARY LEARNER  - 2 YEARS OF COLLEGE  
BARRIERS PRIMARY LEARNER - NONE  
CO-LEARNER CAREGIVER - No  
PRIMARY LANGUAGE ENGLISH ENGLISH  
LEARNER PREFERENCE PRIMARY DEMONSTRATION LISTENING  
ANSWERED BY PATIENT  Patient RELATIONSHIP SELF SELF Depression Screening: 
:  
 
3 most recent PHQ Screens 2/19/2019 Little interest or pleasure in doing things Not at all Feeling down, depressed, irritable, or hopeless Not at all Total Score PHQ 2 0 No flowsheet data found. Fall Risk Assessment: 
:  
 
Fall Risk Assessment, last 12 mths 1/15/2018 Able to walk? Yes Fall in past 12 months? Yes Fall with injury? Yes  
Number of falls in past 12 months 1 Fall Risk Score 2 Abuse Screening: 
:  
 
Abuse Screening Questionnaire 1/15/2018 Do you ever feel afraid of your partner? Ioana Jay Are you in a relationship with someone who physically or mentally threatens you? Ioana Jay Is it safe for you to go home? Y  
 
 
ADL Screening: 
:  
 
ADL Assessment 2/19/2019 Feeding yourself No Help Needed Getting from bed to chair No Help Needed Getting dressed No Help Needed Bathing or showering No Help Needed Walk across the room (includes cane/walker) No Help Needed Using the telphone No Help Needed Taking your medications No Help Needed Preparing meals No Help Needed Managing money (expenses/bills) No Help Needed Moderately strenuous housework (laundry) No Help Needed Shopping for personal items (toiletries/medicines) No Help Needed Shopping for groceries No Help Needed Driving No Help Needed Climbing a flight of stairs No Help Needed Getting to places beyond walking distances No Help Needed BMI: 
Weight Metrics 2/19/2019 1/11/2019 8/23/2018 8/17/2018 6/25/2018 5/22/2018 5/20/2018 Weight 184 lb 8 oz 198 lb 194 lb 195 lb 189 lb 14.4 oz 189 lb 191 lb 2.2 oz  
BMI 29.78 kg/m2 31.96 kg/m2 31.31 kg/m2 31.47 kg/m2 30.65 kg/m2 30.51 kg/m2 30.85 kg/m2 Medication reconciliation up to date and corrected with patient at this time.

## 2019-02-19 NOTE — PATIENT INSTRUCTIONS
Migraine Headache: Care Instructions Your Care Instructions Migraines are painful, throbbing headaches that often start on one side of the head. They may cause nausea and vomiting and make you sensitive to light, sound, or smell. Without treatment, migraines can last from 4 hours to a few days. Medicines can help prevent migraines or stop them after they have started. Your doctor can help you find which ones work best for you. Follow-up care is a key part of your treatment and safety. Be sure to make and go to all appointments, and call your doctor if you are having problems. It's also a good idea to know your test results and keep a list of the medicines you take. How can you care for yourself at home? · Do not drive if you have taken a prescription pain medicine. · Rest in a quiet, dark room until your headache is gone. Close your eyes, and try to relax or go to sleep. Don't watch TV or read. · Put a cold, moist cloth or cold pack on the painful area for 10 to 20 minutes at a time. Put a thin cloth between the cold pack and your skin. · Use a warm, moist towel or a heating pad set on low to relax tight shoulder and neck muscles. · Have someone gently massage your neck and shoulders. · Take your medicines exactly as prescribed. Call your doctor if you think you are having a problem with your medicine. You will get more details on the specific medicines your doctor prescribes. · Be careful not to take pain medicine more often than the instructions allow. You could get worse or more frequent headaches when the medicine wears off. To prevent migraines · Keep a headache diary so you can figure out what triggers your headaches. Avoiding triggers may help you prevent headaches. Record when each headache began, how long it lasted, and what the pain was like.  (Was it throbbing, aching, stabbing, or dull?) Write down any other symptoms you had with the headache, such as nausea, flashing lights or dark spots, or sensitivity to bright light or loud noise. Note if the headache occurred near your period. List anything that might have triggered the headache. Triggers may include certain foods (chocolate, cheese, wine) or odors, smoke, bright light, stress, or lack of sleep. · If your doctor has prescribed medicine for your migraines, take it as directed. You may have medicine that you take only when you get a migraine and medicine that you take all the time to help prevent migraines. ? If your doctor has prescribed medicine for when you get a headache, take it at the first sign of a migraine, unless your doctor has given you other instructions. ? If your doctor has prescribed medicine to prevent migraines, take it exactly as prescribed. Call your doctor if you think you are having a problem with your medicine. · Find healthy ways to deal with stress. Migraines are most common during or right after stressful times. Take time to relax before and after you do something that has caused a migraine in the past. 
· Try to keep your muscles relaxed by keeping good posture. Check your jaw, face, neck, and shoulder muscles for tension. Try to relax them. When you sit at a desk, change positions often. And make sure to stretch for 30 seconds each hour. · Get plenty of sleep and exercise. · Eat meals on a regular schedule. Avoid foods and drinks that often trigger migraines. These include chocolate, alcohol (especially red wine and port), aspartame, monosodium glutamate (MSG), and some additives found in foods (such as hot dogs, mora, cold cuts, aged cheeses, and pickled foods). · Limit caffeine. Don't drink too much coffee, tea, or soda. But don't quit caffeine suddenly. That can also give you migraines. · Do not smoke or allow others to smoke around you. If you need help quitting, talk to your doctor about stop-smoking programs and medicines. These can increase your chances of quitting for good. · If you are taking birth control pills or hormone therapy, talk to your doctor about whether they are triggering your migraines. When should you call for help? Call 911 anytime you think you may need emergency care. For example, call if: 
  · You have signs of a stroke. These may include: 
? Sudden numbness, paralysis, or weakness in your face, arm, or leg, especially on only one side of your body. ? Sudden vision changes. ? Sudden trouble speaking. ? Sudden confusion or trouble understanding simple statements. ? Sudden problems with walking or balance. ? A sudden, severe headache that is different from past headaches.  
 Call your doctor now or seek immediate medical care if: 
  · You have new or worse nausea and vomiting.  
  · You have a new or higher fever.  
  · Your headache gets much worse.  
 Watch closely for changes in your health, and be sure to contact your doctor if: 
  · You are not getting better after 2 days (48 hours). Where can you learn more? Go to http://vilma-francois.info/. Enter P936 in the search box to learn more about \"Migraine Headache: Care Instructions. \" Current as of: Ashley 3, 2018 Content Version: 11.9 © 8894-5709 [employer]. Care instructions adapted under license by [employer] (which disclaims liability or warranty for this information). If you have questions about a medical condition or this instruction, always ask your healthcare professional. [place] [address] any warranty or liability for your use of this information. Back Pain: Care Instructions Your Care Instructions Back pain has many possible causes. It is often related to problems with muscles and ligaments of the back. It may also be related to problems with the nerves, discs, or bones of the back.  Moving, lifting, standing, sitting, or sleeping in an awkward way can strain the back. Sometimes you don't notice the injury until later. Arthritis is another common cause of back pain. Although it may hurt a lot, back pain usually improves on its own within several weeks. Most people recover in 12 weeks or less. Using good home treatment and being careful not to stress your back can help you feel better sooner. Follow-up care is a key part of your treatment and safety. Be sure to make and go to all appointments, and call your doctor if you are having problems. It's also a good idea to know your test results and keep a list of the medicines you take. How can you care for yourself at home? · Sit or lie in positions that are most comfortable and reduce your pain. Try one of these positions when you lie down: ? Lie on your back with your knees bent and supported by large pillows. ? Lie on the floor with your legs on the seat of a sofa or chair. ? Lie on your side with your knees and hips bent and a pillow between your legs. ? Lie on your stomach if it does not make pain worse. · Do not sit up in bed, and avoid soft couches and twisted positions. Bed rest can help relieve pain at first, but it delays healing. Avoid bed rest after the first day of back pain. · Change positions every 30 minutes. If you must sit for long periods of time, take breaks from sitting. Get up and walk around, or lie in a comfortable position. · Try using a heating pad on a low or medium setting for 15 to 20 minutes every 2 or 3 hours. Try a warm shower in place of one session with the heating pad. · You can also try an ice pack for 10 to 15 minutes every 2 to 3 hours. Put a thin cloth between the ice pack and your skin. · Take pain medicines exactly as directed. ? If the doctor gave you a prescription medicine for pain, take it as prescribed. ? If you are not taking a prescription pain medicine, ask your doctor if you can take an over-the-counter medicine. · Take short walks several times a day. You can start with 5 to 10 minutes, 3 or 4 times a day, and work up to longer walks. Walk on level surfaces and avoid hills and stairs until your back is better. · Return to work and other activities as soon as you can. Continued rest without activity is usually not good for your back. · To prevent future back pain, do exercises to stretch and strengthen your back and stomach. Learn how to use good posture, safe lifting techniques, and proper body mechanics. When should you call for help? Call your doctor now or seek immediate medical care if: 
  · You have new or worsening numbness in your legs.  
  · You have new or worsening weakness in your legs. (This could make it hard to stand up.)  
  · You lose control of your bladder or bowels.  
 Watch closely for changes in your health, and be sure to contact your doctor if: 
  · You have a fever, lose weight, or don't feel well.  
  · You do not get better as expected. Where can you learn more? Go to http://vilma-francois.info/. Enter P934 in the search box to learn more about \"Back Pain: Care Instructions. \" Current as of: September 20, 2018 Content Version: 11.9 © 2707-7665 [employer]. Care instructions adapted under license by [employer] (which disclaims liability or warranty for this information). If you have questions about a medical condition or this instruction, always ask your healthcare professional. [place] [address] any warranty or liability for your use of this information.

## 2019-02-19 NOTE — PROGRESS NOTES
Chief Complaint Patient presents with  
 Headache Rm 5  
 
 
HPI: 
The patient is a 25 y.o. female who presents today for a follow up appointment. No hospital, ER or specialist visits since last primary care visit except as noted below. 3 days ago, started with migraine. Took Imitrex, Topamax, Maxalt, Ibuprofen, Tylenol, it will ease off for 30-40 minutes but returns. Also has N/V, sensitivity to light. Denies blurred vision, visual disturbances. Review of Systems A comprehensive review of systems was negative except for that written in the HPI. Patient Active Problem List  
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  
 Cervicalgia M54.2  Lumbar degenerative disc disease M51.36  
 Lumbar radiculopathy M54.16 Past Medical History:  
Diagnosis Date  Headache  Hypertension  Lumbar degenerative disc disease 2018  Other ill-defined conditions(799.89)   
 kidney infections  Other ill-defined conditions(799.89) Migraines  Ovarian cyst   
 Ruptured ovarian cyst 2018 Past Surgical History:  
Procedure Laterality Date  HX ORTHOPAEDIC    
 pelvic bone fx Social History Tobacco Use  Smoking status: Never Smoker  Smokeless tobacco: Never Used Substance Use Topics  Alcohol use: No  
 Drug use: No  
 
 
Family History Problem Relation Age of Onset  No Known Problems Mother  No Known Problems Father  No Known Problems Brother Current Outpatient Medications on File Prior to Visit Medication Sig Dispense Refill  topiramate (TOPAMAX) 50 mg tablet Take 50 mg by mouth nightly.  rizatriptan (MAXALT) 10 mg tablet TAKE 1 TABLET BY MOUTH ONCE AS NEEDED FOR MIGRAINE FOR UP TO 1 DOSE. MAY REPEAT IN 2 HOURS IF NEEDED.  9 Tab 3  
 
 No current facility-administered medications on file prior to visit. Allergies Allergen Reactions  Ciprofloxacin Hives  Pcn [Penicillins] Hives PE: 
Visit Vitals /80 (BP 1 Location: Right arm, BP Patient Position: Sitting) Pulse 76 Temp 97.7 °F (36.5 °C) (Oral) Resp 20 Ht 5' 6\" (1.676 m) Wt 184 lb 8 oz (83.7 kg) LMP 02/14/2019 (Exact Date) SpO2 97% BMI 29.78 kg/m² Results for orders placed or performed in visit on 01/11/19 AMB POC MAGALIS INFLUENZA A/B TEST Result Value Ref Range VALID INTERNAL CONTROL POC Yes Influenza A Ag POC Negative Negative Pos/Neg Influenza B Ag POC Negative Negative Pos/Neg Gen: alert, oriented, no acute distress Head: normocephalic, atraumatic Ears: external auditory canals clear, TMs without erythema or effusion Eyes: pupils equal round reactive to light, sclera clear, conjunctiva clear Oral: moist mucus membranes, no oral lesions, no pharyngeal inflammation or exudate Neck: symmetric normal sized thyroid, no carotid bruits, no jugular vein distention Resp: no increase work of breathing, lungs clear to ausculation bilaterally, no wheezing, rales or rhonchi CV: S1, S2 normal.  No murmurs, rubs, or gallops. Abd: soft, not tender, not distended. No hepatosplenomegaly. Normal bowel sounds. No hernias. No abdominal or renal bruits. Neuro: cranial nerves intact, normal strength and movement in all extremities, reflexes and sensation intact and symmetric. Skin: no lesion or rash Extremities: no cyanosis or edema Assessment/Plan: ICD-10-CM ICD-9-CM 1. Intractable migraine without aura and with status migrainosus G43.011 346.13 ondansetron (ZOFRAN ODT) 8 mg disintegrating tablet  
   ketorolac tromethamine (TORADOL) 60 mg/2 mL soln KETOROLAC TROMETHAMINE INJ  
   DC THER/PROPH/DIAG INJECTION, SUBCUT/IM  
   butalbital-acetaminophen-caffeine (FIORICET, ESGIC) -40 mg per tablet 2. Lumbar back pain with radiculopathy affecting left lower extremity M54.16 724.4 Follow-up Disposition: 
Return if symptoms worsen or fail to improve. reviewed diet, exercise and weight control 
reviewed medications and side effects in detail Health Maintenance reviewed - reviewed, UTD, needs flu vaccine when she is well, patient to make appointment at that time. Recommended healthy diet low in carbohydrates, fats, sodium and cholesterol. Recommended regular cardiovascular exercise 3-6 times per week for 30-60 minutes daily. Chart is reviewed and updated today in the office. Records requested for other providers patient has seen and is currently seeing. Patient was offered a choice/choices in the treatment plan today. Patient expresses understanding of the plan and agrees with recommendations. Verbal and written instructions (see AVS) provided. See patient instructions for more. Patient expresses understanding of diagnosis and treatment plan.

## 2019-08-06 ENCOUNTER — TELEPHONE (OUTPATIENT)
Dept: FAMILY MEDICINE CLINIC | Age: 29
End: 2019-08-06

## 2019-10-22 LAB
ANTIBODY SCREEN, EXTERNAL: NEGATIVE
CHLAMYDIA, EXTERNAL: NORMAL
HBSAG, EXTERNAL: NEGATIVE
HIV, EXTERNAL: NON REACTIVE
N. GONORRHEA, EXTERNAL: NORMAL
RUBELLA, EXTERNAL: NORMAL
T. PALLIDUM, EXTERNAL: NON REACTIVE
TYPE, ABO & RH, EXTERNAL: NORMAL

## 2019-10-29 DIAGNOSIS — G43.011 INTRACTABLE MIGRAINE WITHOUT AURA AND WITH STATUS MIGRAINOSUS: ICD-10-CM

## 2019-10-29 RX ORDER — BUTALBITAL, ACETAMINOPHEN AND CAFFEINE 50; 325; 40 MG/1; MG/1; MG/1
TABLET ORAL
Qty: 20 TAB | Refills: 0 | Status: SHIPPED | OUTPATIENT
Start: 2019-10-29 | End: 2019-11-13 | Stop reason: SDUPTHER

## 2019-11-13 DIAGNOSIS — G43.011 INTRACTABLE MIGRAINE WITHOUT AURA AND WITH STATUS MIGRAINOSUS: ICD-10-CM

## 2019-11-13 NOTE — TELEPHONE ENCOUNTER
Writer attempted to call [phone] to see what needed to be clarified for patient's Collins Jean-Baptiste. Velma did not answer.

## 2019-11-18 RX ORDER — BUTALBITAL, ACETAMINOPHEN AND CAFFEINE 50; 325; 40 MG/1; MG/1; MG/1
TABLET ORAL
Qty: 20 TAB | Refills: 0 | Status: SHIPPED | OUTPATIENT
Start: 2019-11-18 | End: 2020-05-21

## 2019-11-18 NOTE — TELEPHONE ENCOUNTER
PCP: Lacy Davis, NP    Last appt: 10/16/2019  No future appointments.     Requested Prescriptions     Pending Prescriptions Disp Refills    butalbital-acetaminophen-caffeine (FIORICET, ESGIC) -40 mg per tablet 20 Tab 0     Sig: TAKE 1 TABLET BY MOUTH EVERY 4 HOURS AS NEEDED FOR PAIN

## 2020-01-04 ENCOUNTER — ANESTHESIA (OUTPATIENT)
Dept: SURGERY | Age: 30
End: 2020-01-04
Payer: MEDICAID

## 2020-01-04 ENCOUNTER — HOSPITAL ENCOUNTER (OUTPATIENT)
Age: 30
Setting detail: OBSERVATION
Discharge: HOME | End: 2020-01-05
Attending: EMERGENCY MEDICINE | Admitting: SURGERY
Payer: MEDICAID

## 2020-01-04 ENCOUNTER — APPOINTMENT (OUTPATIENT)
Dept: ULTRASOUND IMAGING | Age: 30
End: 2020-01-04
Attending: EMERGENCY MEDICINE
Payer: MEDICAID

## 2020-01-04 ENCOUNTER — ANESTHESIA EVENT (OUTPATIENT)
Dept: SURGERY | Age: 30
End: 2020-01-04
Payer: MEDICAID

## 2020-01-04 ENCOUNTER — APPOINTMENT (OUTPATIENT)
Dept: MRI IMAGING | Age: 30
End: 2020-01-04
Attending: EMERGENCY MEDICINE
Payer: MEDICAID

## 2020-01-04 ENCOUNTER — APPOINTMENT (OUTPATIENT)
Dept: CT IMAGING | Age: 30
End: 2020-01-04
Attending: EMERGENCY MEDICINE
Payer: MEDICAID

## 2020-01-04 DIAGNOSIS — K35.80 ACUTE APPENDICITIS COMPLICATING PREGNANCY: ICD-10-CM

## 2020-01-04 DIAGNOSIS — Z3A.20 20 WEEKS GESTATION OF PREGNANCY: ICD-10-CM

## 2020-01-04 DIAGNOSIS — O99.619 ACUTE APPENDICITIS COMPLICATING PREGNANCY: ICD-10-CM

## 2020-01-04 DIAGNOSIS — K35.80 ACUTE APPENDICITIS, UNSPECIFIED ACUTE APPENDICITIS TYPE: Primary | ICD-10-CM

## 2020-01-04 LAB
ALBUMIN SERPL-MCNC: 3 G/DL (ref 3.5–5)
ALBUMIN/GLOB SERPL: 0.8 {RATIO} (ref 1.1–2.2)
ALP SERPL-CCNC: 38 U/L (ref 45–117)
ALT SERPL-CCNC: 34 U/L (ref 12–78)
ANION GAP SERPL CALC-SCNC: 8 MMOL/L (ref 5–15)
APPEARANCE UR: CLEAR
AST SERPL-CCNC: 15 U/L (ref 15–37)
BACTERIA URNS QL MICRO: ABNORMAL /HPF
BASOPHILS # BLD: 0 K/UL (ref 0–0.1)
BASOPHILS NFR BLD: 0 % (ref 0–1)
BILIRUB SERPL-MCNC: 0.2 MG/DL (ref 0.2–1)
BILIRUB UR QL: NEGATIVE
BUN SERPL-MCNC: 5 MG/DL (ref 6–20)
BUN/CREAT SERPL: 9 (ref 12–20)
CALCIUM SERPL-MCNC: 8.6 MG/DL (ref 8.5–10.1)
CHLORIDE SERPL-SCNC: 110 MMOL/L (ref 97–108)
CO2 SERPL-SCNC: 22 MMOL/L (ref 21–32)
COLOR UR: ABNORMAL
CREAT SERPL-MCNC: 0.54 MG/DL (ref 0.55–1.02)
DIFFERENTIAL METHOD BLD: ABNORMAL
EOSINOPHIL # BLD: 0.1 K/UL (ref 0–0.4)
EOSINOPHIL NFR BLD: 1 % (ref 0–7)
EPITH CASTS URNS QL MICRO: ABNORMAL /LPF
ERYTHROCYTE [DISTWIDTH] IN BLOOD BY AUTOMATED COUNT: 13.1 % (ref 11.5–14.5)
GLOBULIN SER CALC-MCNC: 3.6 G/DL (ref 2–4)
GLUCOSE SERPL-MCNC: 85 MG/DL (ref 65–100)
GLUCOSE UR STRIP.AUTO-MCNC: NEGATIVE MG/DL
HCT VFR BLD AUTO: 35.5 % (ref 35–47)
HGB BLD-MCNC: 11.7 G/DL (ref 11.5–16)
HGB UR QL STRIP: NEGATIVE
HYALINE CASTS URNS QL MICRO: ABNORMAL /LPF (ref 0–5)
IMM GRANULOCYTES # BLD AUTO: 0 K/UL (ref 0–0.04)
IMM GRANULOCYTES NFR BLD AUTO: 0 % (ref 0–0.5)
KETONES UR QL STRIP.AUTO: ABNORMAL MG/DL
LEUKOCYTE ESTERASE UR QL STRIP.AUTO: ABNORMAL
LYMPHOCYTES # BLD: 1.2 K/UL (ref 0.8–3.5)
LYMPHOCYTES NFR BLD: 14 % (ref 12–49)
MCH RBC QN AUTO: 30.2 PG (ref 26–34)
MCHC RBC AUTO-ENTMCNC: 33 G/DL (ref 30–36.5)
MCV RBC AUTO: 91.5 FL (ref 80–99)
MONOCYTES # BLD: 0.5 K/UL (ref 0–1)
MONOCYTES NFR BLD: 5 % (ref 5–13)
MUCOUS THREADS URNS QL MICRO: ABNORMAL /LPF
NEUTS SEG # BLD: 7.1 K/UL (ref 1.8–8)
NEUTS SEG NFR BLD: 80 % (ref 32–75)
NITRITE UR QL STRIP.AUTO: NEGATIVE
NRBC # BLD: 0 K/UL (ref 0–0.01)
NRBC BLD-RTO: 0 PER 100 WBC
PH UR STRIP: 7 [PH] (ref 5–8)
PLATELET # BLD AUTO: 260 K/UL (ref 150–400)
PMV BLD AUTO: 10.8 FL (ref 8.9–12.9)
POTASSIUM SERPL-SCNC: 3.6 MMOL/L (ref 3.5–5.1)
PROT SERPL-MCNC: 6.6 G/DL (ref 6.4–8.2)
PROT UR STRIP-MCNC: NEGATIVE MG/DL
RBC # BLD AUTO: 3.88 M/UL (ref 3.8–5.2)
RBC #/AREA URNS HPF: ABNORMAL /HPF (ref 0–5)
SODIUM SERPL-SCNC: 140 MMOL/L (ref 136–145)
SP GR UR REFRACTOMETRY: 1.02 (ref 1–1.03)
UA: UC IF INDICATED,UAUC: ABNORMAL
UROBILINOGEN UR QL STRIP.AUTO: 1 EU/DL (ref 0.2–1)
WBC # BLD AUTO: 8.9 K/UL (ref 3.6–11)
WBC URNS QL MICRO: ABNORMAL /HPF (ref 0–4)

## 2020-01-04 PROCEDURE — 99218 HC RM OBSERVATION: CPT

## 2020-01-04 PROCEDURE — 36415 COLL VENOUS BLD VENIPUNCTURE: CPT

## 2020-01-04 PROCEDURE — 77030008595 HC TRCR ENDOSC AMR -A: Performed by: SURGERY

## 2020-01-04 PROCEDURE — 74011000250 HC RX REV CODE- 250: Performed by: SURGERY

## 2020-01-04 PROCEDURE — 74011000258 HC RX REV CODE- 258: Performed by: SURGERY

## 2020-01-04 PROCEDURE — 81001 URINALYSIS AUTO W/SCOPE: CPT

## 2020-01-04 PROCEDURE — 77030008684 HC TU ET CUF COVD -B: Performed by: NURSE ANESTHETIST, CERTIFIED REGISTERED

## 2020-01-04 PROCEDURE — 77030008771 HC TU NG SALEM SUMP -A: Performed by: NURSE ANESTHETIST, CERTIFIED REGISTERED

## 2020-01-04 PROCEDURE — 77030026438 HC STYL ET INTUB CARD -A: Performed by: NURSE ANESTHETIST, CERTIFIED REGISTERED

## 2020-01-04 PROCEDURE — 76770 US EXAM ABDO BACK WALL COMP: CPT

## 2020-01-04 PROCEDURE — 96376 TX/PRO/DX INJ SAME DRUG ADON: CPT

## 2020-01-04 PROCEDURE — 74011250636 HC RX REV CODE- 250/636: Performed by: EMERGENCY MEDICINE

## 2020-01-04 PROCEDURE — 76060000032 HC ANESTHESIA 0.5 TO 1 HR: Performed by: SURGERY

## 2020-01-04 PROCEDURE — 77030019908 HC STETH ESOPH SIMS -A: Performed by: NURSE ANESTHETIST, CERTIFIED REGISTERED

## 2020-01-04 PROCEDURE — 77030009963 HC RELD STPLR ECR J&J -C: Performed by: SURGERY

## 2020-01-04 PROCEDURE — 99222 1ST HOSP IP/OBS MODERATE 55: CPT | Performed by: SURGERY

## 2020-01-04 PROCEDURE — 77030012799 HC TRCR GELPRT BLN AMR -B: Performed by: SURGERY

## 2020-01-04 PROCEDURE — 76210000016 HC OR PH I REC 1 TO 1.5 HR: Performed by: SURGERY

## 2020-01-04 PROCEDURE — 77030002933 HC SUT MCRYL J&J -A: Performed by: SURGERY

## 2020-01-04 PROCEDURE — 77030013079 HC BLNKT BAIR HGGR 3M -A: Performed by: NURSE ANESTHETIST, CERTIFIED REGISTERED

## 2020-01-04 PROCEDURE — 88305 TISSUE EXAM BY PATHOLOGIST: CPT

## 2020-01-04 PROCEDURE — 80053 COMPREHEN METABOLIC PANEL: CPT

## 2020-01-04 PROCEDURE — 74011250636 HC RX REV CODE- 250/636: Performed by: NURSE ANESTHETIST, CERTIFIED REGISTERED

## 2020-01-04 PROCEDURE — 87086 URINE CULTURE/COLONY COUNT: CPT

## 2020-01-04 PROCEDURE — 77030011640 HC PAD GRND REM COVD -A: Performed by: SURGERY

## 2020-01-04 PROCEDURE — 77030005513 HC CATH URETH FOL11 MDII -B: Performed by: SURGERY

## 2020-01-04 PROCEDURE — 96374 THER/PROPH/DIAG INJ IV PUSH: CPT

## 2020-01-04 PROCEDURE — 77030010507 HC ADH SKN DERMBND J&J -B: Performed by: SURGERY

## 2020-01-04 PROCEDURE — 44970 LAPAROSCOPY APPENDECTOMY: CPT | Performed by: SURGERY

## 2020-01-04 PROCEDURE — 96375 TX/PRO/DX INJ NEW DRUG ADDON: CPT

## 2020-01-04 PROCEDURE — 77030040361 HC SLV COMPR DVT MDII -B: Performed by: SURGERY

## 2020-01-04 PROCEDURE — 77030034628 HC LIGASURE LAP SEAL DIV MD COVD -F: Performed by: SURGERY

## 2020-01-04 PROCEDURE — 74011000250 HC RX REV CODE- 250: Performed by: NURSE ANESTHETIST, CERTIFIED REGISTERED

## 2020-01-04 PROCEDURE — 99284 EMERGENCY DEPT VISIT MOD MDM: CPT

## 2020-01-04 PROCEDURE — 77030031139 HC SUT VCRL2 J&J -A: Performed by: SURGERY

## 2020-01-04 PROCEDURE — 99285 EMERGENCY DEPT VISIT HI MDM: CPT

## 2020-01-04 PROCEDURE — 74011250636 HC RX REV CODE- 250/636: Performed by: SURGERY

## 2020-01-04 PROCEDURE — 85025 COMPLETE CBC W/AUTO DIFF WBC: CPT

## 2020-01-04 PROCEDURE — 77030008756 HC TU IRR SUC STRY -B: Performed by: SURGERY

## 2020-01-04 PROCEDURE — 77030007955 HC PCH ENDOSC SPEC J&J -B: Performed by: SURGERY

## 2020-01-04 PROCEDURE — 74011250636 HC RX REV CODE- 250/636: Performed by: ANESTHESIOLOGY

## 2020-01-04 PROCEDURE — 76010000138 HC OR TIME 0.5 TO 1 HR: Performed by: SURGERY

## 2020-01-04 PROCEDURE — 77030008606 HC TRCR ENDOSC KII AMR -B: Performed by: SURGERY

## 2020-01-04 PROCEDURE — 88304 TISSUE EXAM BY PATHOLOGIST: CPT

## 2020-01-04 PROCEDURE — 74181 MRI ABDOMEN W/O CONTRAST: CPT

## 2020-01-04 RX ORDER — ACETAMINOPHEN 325 MG/1
325 TABLET ORAL
COMMUNITY
End: 2020-05-21

## 2020-01-04 RX ORDER — SODIUM CHLORIDE 0.9 % (FLUSH) 0.9 %
5-40 SYRINGE (ML) INJECTION EVERY 8 HOURS
Status: DISCONTINUED | OUTPATIENT
Start: 2020-01-04 | End: 2020-01-04 | Stop reason: HOSPADM

## 2020-01-04 RX ORDER — FENTANYL CITRATE 50 UG/ML
INJECTION, SOLUTION INTRAMUSCULAR; INTRAVENOUS AS NEEDED
Status: DISCONTINUED | OUTPATIENT
Start: 2020-01-04 | End: 2020-01-04 | Stop reason: HOSPADM

## 2020-01-04 RX ORDER — SUCCINYLCHOLINE CHLORIDE 20 MG/ML
INJECTION INTRAMUSCULAR; INTRAVENOUS AS NEEDED
Status: DISCONTINUED | OUTPATIENT
Start: 2020-01-04 | End: 2020-01-04 | Stop reason: HOSPADM

## 2020-01-04 RX ORDER — ONDANSETRON 2 MG/ML
4 INJECTION INTRAMUSCULAR; INTRAVENOUS
Status: COMPLETED | OUTPATIENT
Start: 2020-01-04 | End: 2020-01-04

## 2020-01-04 RX ORDER — LIDOCAINE HYDROCHLORIDE 20 MG/ML
INJECTION, SOLUTION EPIDURAL; INFILTRATION; INTRACAUDAL; PERINEURAL AS NEEDED
Status: DISCONTINUED | OUTPATIENT
Start: 2020-01-04 | End: 2020-01-04 | Stop reason: HOSPADM

## 2020-01-04 RX ORDER — MORPHINE SULFATE 2 MG/ML
4 INJECTION, SOLUTION INTRAMUSCULAR; INTRAVENOUS
Status: COMPLETED | OUTPATIENT
Start: 2020-01-04 | End: 2020-01-04

## 2020-01-04 RX ORDER — BUPIVACAINE HYDROCHLORIDE 5 MG/ML
INJECTION, SOLUTION EPIDURAL; INTRACAUDAL AS NEEDED
Status: DISCONTINUED | OUTPATIENT
Start: 2020-01-04 | End: 2020-01-04 | Stop reason: HOSPADM

## 2020-01-04 RX ORDER — LIDOCAINE HYDROCHLORIDE 10 MG/ML
0.1 INJECTION, SOLUTION EPIDURAL; INFILTRATION; INTRACAUDAL; PERINEURAL AS NEEDED
Status: DISCONTINUED | OUTPATIENT
Start: 2020-01-04 | End: 2020-01-04 | Stop reason: HOSPADM

## 2020-01-04 RX ORDER — NEOSTIGMINE METHYLSULFATE 1 MG/ML
INJECTION INTRAVENOUS AS NEEDED
Status: DISCONTINUED | OUTPATIENT
Start: 2020-01-04 | End: 2020-01-04 | Stop reason: HOSPADM

## 2020-01-04 RX ORDER — SODIUM CHLORIDE 0.9 % (FLUSH) 0.9 %
5-40 SYRINGE (ML) INJECTION AS NEEDED
Status: DISCONTINUED | OUTPATIENT
Start: 2020-01-04 | End: 2020-01-04 | Stop reason: HOSPADM

## 2020-01-04 RX ORDER — CEFOXITIN 2 G/1
INJECTION, POWDER, FOR SOLUTION INTRAVENOUS
Status: DISCONTINUED
Start: 2020-01-04 | End: 2020-01-04

## 2020-01-04 RX ORDER — PROPOFOL 10 MG/ML
INJECTION, EMULSION INTRAVENOUS AS NEEDED
Status: DISCONTINUED | OUTPATIENT
Start: 2020-01-04 | End: 2020-01-04 | Stop reason: HOSPADM

## 2020-01-04 RX ORDER — DEXAMETHASONE SODIUM PHOSPHATE 4 MG/ML
INJECTION, SOLUTION INTRA-ARTICULAR; INTRALESIONAL; INTRAMUSCULAR; INTRAVENOUS; SOFT TISSUE AS NEEDED
Status: DISCONTINUED | OUTPATIENT
Start: 2020-01-04 | End: 2020-01-04 | Stop reason: HOSPADM

## 2020-01-04 RX ORDER — ROCURONIUM BROMIDE 10 MG/ML
INJECTION, SOLUTION INTRAVENOUS AS NEEDED
Status: DISCONTINUED | OUTPATIENT
Start: 2020-01-04 | End: 2020-01-04 | Stop reason: HOSPADM

## 2020-01-04 RX ORDER — SODIUM CHLORIDE 0.9 % (FLUSH) 0.9 %
5-40 SYRINGE (ML) INJECTION AS NEEDED
Status: DISCONTINUED | OUTPATIENT
Start: 2020-01-04 | End: 2020-01-05 | Stop reason: HOSPADM

## 2020-01-04 RX ORDER — SODIUM CHLORIDE 900 MG/100ML
INJECTION INTRAVENOUS
Status: DISCONTINUED
Start: 2020-01-04 | End: 2020-01-04

## 2020-01-04 RX ORDER — HYDROCODONE BITARTRATE AND ACETAMINOPHEN 5; 325 MG/1; MG/1
1 TABLET ORAL
Status: DISCONTINUED | OUTPATIENT
Start: 2020-01-04 | End: 2020-01-05 | Stop reason: HOSPADM

## 2020-01-04 RX ORDER — FENTANYL CITRATE 50 UG/ML
25 INJECTION, SOLUTION INTRAMUSCULAR; INTRAVENOUS
Status: DISCONTINUED | OUTPATIENT
Start: 2020-01-04 | End: 2020-01-04 | Stop reason: HOSPADM

## 2020-01-04 RX ORDER — DEXTROSE, SODIUM CHLORIDE, AND POTASSIUM CHLORIDE 5; .45; .15 G/100ML; G/100ML; G/100ML
100 INJECTION INTRAVENOUS CONTINUOUS
Status: DISCONTINUED | OUTPATIENT
Start: 2020-01-04 | End: 2020-01-04

## 2020-01-04 RX ORDER — SODIUM CHLORIDE, SODIUM LACTATE, POTASSIUM CHLORIDE, CALCIUM CHLORIDE 600; 310; 30; 20 MG/100ML; MG/100ML; MG/100ML; MG/100ML
25 INJECTION, SOLUTION INTRAVENOUS CONTINUOUS
Status: DISCONTINUED | OUTPATIENT
Start: 2020-01-04 | End: 2020-01-04 | Stop reason: HOSPADM

## 2020-01-04 RX ORDER — MORPHINE SULFATE 2 MG/ML
2 INJECTION, SOLUTION INTRAMUSCULAR; INTRAVENOUS
Status: COMPLETED | OUTPATIENT
Start: 2020-01-04 | End: 2020-01-04

## 2020-01-04 RX ORDER — PHENYLEPHRINE HCL IN 0.9% NACL 0.4MG/10ML
SYRINGE (ML) INTRAVENOUS AS NEEDED
Status: DISCONTINUED | OUTPATIENT
Start: 2020-01-04 | End: 2020-01-04 | Stop reason: HOSPADM

## 2020-01-04 RX ORDER — HYDROMORPHONE HYDROCHLORIDE 1 MG/ML
0.5 INJECTION, SOLUTION INTRAMUSCULAR; INTRAVENOUS; SUBCUTANEOUS
Status: DISCONTINUED | OUTPATIENT
Start: 2020-01-04 | End: 2020-01-05 | Stop reason: HOSPADM

## 2020-01-04 RX ORDER — DEXTROSE, SODIUM CHLORIDE, AND POTASSIUM CHLORIDE 5; .45; .15 G/100ML; G/100ML; G/100ML
125 INJECTION INTRAVENOUS CONTINUOUS
Status: DISCONTINUED | OUTPATIENT
Start: 2020-01-04 | End: 2020-01-05 | Stop reason: HOSPADM

## 2020-01-04 RX ORDER — KETOROLAC TROMETHAMINE 30 MG/ML
15 INJECTION, SOLUTION INTRAMUSCULAR; INTRAVENOUS
Status: COMPLETED | OUTPATIENT
Start: 2020-01-04 | End: 2020-01-04

## 2020-01-04 RX ORDER — ONDANSETRON 2 MG/ML
INJECTION INTRAMUSCULAR; INTRAVENOUS AS NEEDED
Status: DISCONTINUED | OUTPATIENT
Start: 2020-01-04 | End: 2020-01-04 | Stop reason: HOSPADM

## 2020-01-04 RX ORDER — SODIUM CHLORIDE 0.9 % (FLUSH) 0.9 %
5-40 SYRINGE (ML) INJECTION EVERY 8 HOURS
Status: DISCONTINUED | OUTPATIENT
Start: 2020-01-04 | End: 2020-01-05 | Stop reason: HOSPADM

## 2020-01-04 RX ORDER — SODIUM CHLORIDE, SODIUM LACTATE, POTASSIUM CHLORIDE, CALCIUM CHLORIDE 600; 310; 30; 20 MG/100ML; MG/100ML; MG/100ML; MG/100ML
INJECTION, SOLUTION INTRAVENOUS
Status: DISCONTINUED | OUTPATIENT
Start: 2020-01-04 | End: 2020-01-04 | Stop reason: HOSPADM

## 2020-01-04 RX ORDER — DIPHENHYDRAMINE HYDROCHLORIDE 50 MG/ML
12.5 INJECTION, SOLUTION INTRAMUSCULAR; INTRAVENOUS AS NEEDED
Status: DISCONTINUED | OUTPATIENT
Start: 2020-01-04 | End: 2020-01-04 | Stop reason: HOSPADM

## 2020-01-04 RX ORDER — GLYCOPYRROLATE 0.2 MG/ML
INJECTION INTRAMUSCULAR; INTRAVENOUS AS NEEDED
Status: DISCONTINUED | OUTPATIENT
Start: 2020-01-04 | End: 2020-01-04 | Stop reason: HOSPADM

## 2020-01-04 RX ORDER — ONDANSETRON 2 MG/ML
4 INJECTION INTRAMUSCULAR; INTRAVENOUS
Status: DISCONTINUED | OUTPATIENT
Start: 2020-01-04 | End: 2020-01-05 | Stop reason: HOSPADM

## 2020-01-04 RX ORDER — HYDROMORPHONE HYDROCHLORIDE 1 MG/ML
0.2 INJECTION, SOLUTION INTRAMUSCULAR; INTRAVENOUS; SUBCUTANEOUS
Status: DISCONTINUED | OUTPATIENT
Start: 2020-01-04 | End: 2020-01-04 | Stop reason: HOSPADM

## 2020-01-04 RX ADMIN — ONDANSETRON 4 MG: 2 INJECTION INTRAMUSCULAR; INTRAVENOUS at 09:55

## 2020-01-04 RX ADMIN — Medication 40 MCG: at 18:12

## 2020-01-04 RX ADMIN — SODIUM CHLORIDE 1000 ML: 900 INJECTION, SOLUTION INTRAVENOUS at 09:44

## 2020-01-04 RX ADMIN — SUCCINYLCHOLINE CHLORIDE 120 MG: 20 INJECTION, SOLUTION INTRAMUSCULAR; INTRAVENOUS at 17:57

## 2020-01-04 RX ADMIN — DEXTROSE MONOHYDRATE, SODIUM CHLORIDE, AND POTASSIUM CHLORIDE 125 ML/HR: 50; 4.5; 1.49 INJECTION, SOLUTION INTRAVENOUS at 19:43

## 2020-01-04 RX ADMIN — KETOROLAC TROMETHAMINE 15 MG: 30 INJECTION, SOLUTION INTRAMUSCULAR at 11:23

## 2020-01-04 RX ADMIN — CEFOXITIN 2 G: 2 INJECTION, POWDER, FOR SOLUTION INTRAVENOUS at 17:56

## 2020-01-04 RX ADMIN — HYDROMORPHONE HYDROCHLORIDE 0.5 MG: 1 INJECTION, SOLUTION INTRAMUSCULAR; INTRAVENOUS; SUBCUTANEOUS at 21:31

## 2020-01-04 RX ADMIN — ONDANSETRON HYDROCHLORIDE 4 MG: 2 INJECTION, SOLUTION INTRAMUSCULAR; INTRAVENOUS at 18:22

## 2020-01-04 RX ADMIN — GLYCOPYRROLATE 0.4 MG: 0.2 INJECTION, SOLUTION INTRAMUSCULAR; INTRAVENOUS at 18:27

## 2020-01-04 RX ADMIN — FENTANYL CITRATE 50 MCG: 50 INJECTION, SOLUTION INTRAMUSCULAR; INTRAVENOUS at 18:25

## 2020-01-04 RX ADMIN — LIDOCAINE HYDROCHLORIDE 100 MG: 20 INJECTION, SOLUTION INTRAVENOUS at 17:55

## 2020-01-04 RX ADMIN — DEXAMETHASONE SODIUM PHOSPHATE 8 MG: 4 INJECTION, SOLUTION INTRAMUSCULAR; INTRAVENOUS at 18:06

## 2020-01-04 RX ADMIN — MORPHINE SULFATE 4 MG: 2 INJECTION, SOLUTION INTRAMUSCULAR; INTRAVENOUS at 09:55

## 2020-01-04 RX ADMIN — SODIUM CHLORIDE, POTASSIUM CHLORIDE, SODIUM LACTATE AND CALCIUM CHLORIDE: 600; 310; 30; 20 INJECTION, SOLUTION INTRAVENOUS at 17:43

## 2020-01-04 RX ADMIN — FENTANYL CITRATE 25 MCG: 50 INJECTION, SOLUTION INTRAMUSCULAR; INTRAVENOUS at 19:18

## 2020-01-04 RX ADMIN — FENTANYL CITRATE 25 MCG: 50 INJECTION, SOLUTION INTRAMUSCULAR; INTRAVENOUS at 19:42

## 2020-01-04 RX ADMIN — FENTANYL CITRATE 100 MCG: 50 INJECTION, SOLUTION INTRAMUSCULAR; INTRAVENOUS at 17:55

## 2020-01-04 RX ADMIN — MORPHINE SULFATE 2 MG: 2 INJECTION, SOLUTION INTRAMUSCULAR; INTRAVENOUS at 13:27

## 2020-01-04 RX ADMIN — Medication 40 MCG: at 18:23

## 2020-01-04 RX ADMIN — PROPOFOL 200 MG: 10 INJECTION, EMULSION INTRAVENOUS at 17:57

## 2020-01-04 RX ADMIN — SODIUM CHLORIDE, POTASSIUM CHLORIDE, SODIUM LACTATE AND CALCIUM CHLORIDE: 600; 310; 30; 20 INJECTION, SOLUTION INTRAVENOUS at 18:14

## 2020-01-04 RX ADMIN — MORPHINE SULFATE 2 MG: 2 INJECTION, SOLUTION INTRAMUSCULAR; INTRAVENOUS at 11:23

## 2020-01-04 RX ADMIN — ONDANSETRON 4 MG: 2 INJECTION INTRAMUSCULAR; INTRAVENOUS at 21:31

## 2020-01-04 RX ADMIN — Medication 40 MCG: at 18:16

## 2020-01-04 RX ADMIN — Medication 40 MCG: at 18:19

## 2020-01-04 RX ADMIN — NEOSTIGMINE METHYLSULFATE 3 MG: 1 INJECTION INTRAVENOUS at 18:27

## 2020-01-04 RX ADMIN — Medication 10 ML: at 21:31

## 2020-01-04 RX ADMIN — DEXTROSE MONOHYDRATE, SODIUM CHLORIDE, AND POTASSIUM CHLORIDE 100 ML/HR: 50; 4.5; 1.49 INJECTION, SOLUTION INTRAVENOUS at 13:54

## 2020-01-04 RX ADMIN — ROCURONIUM BROMIDE 20 MG: 10 INJECTION INTRAVENOUS at 18:03

## 2020-01-04 NOTE — PERIOP NOTES
TRANSFER - IN REPORT:    Verbal report received from Glenn RN (name) on Trena Lied  being received from ED(unit) for routine post - op      Report consisted of patients Situation, Background, Assessment and   Recommendations(SBAR). Information from the following report(s) SBAR, Kardex, ED Summary, [facility] and Recent Results was reviewed with the receiving nurse. Opportunity for questions and clarification was provided. Assessment completed upon patients arrival to unit and care assumed.

## 2020-01-04 NOTE — ED PROVIDER NOTES
EMERGENCY DEPARTMENT HISTORY AND PHYSICAL EXAM      Date: 1/4/2020  Patient Name: Celena Hubbard    History of Presenting Illness     Chief Complaint   Patient presents with    Abdominal Pain     RLQ abdominal pain radiating to flank since this morning. pt is pregnant, 20 weeks; EDC 05/21/20.  no vomiting or fever       History Provided By: Patient    HPI: Celena Hubbard, 30 y.o. female 20 weeks gestation with single fetus, presents to the ED with cc of right sided abdominal pain. Pt sudden onset sharp in nature 10/10 no alleviating or exacerbating factors. Pain onset at 4 am. + Nausea no vomiting. No diarrhea. No vaginal bleeding, no vaginal leakage of fluids. Pt denies fever or chills. No chest pain or SOA. There are no other complaints, changes, or physical findings at this time. PCP: Coleen Bill NP    No current facility-administered medications on file prior to encounter. Current Outpatient Medications on File Prior to Encounter   Medication Sig Dispense Refill    butalbital-acetaminophen-caffeine (FIORICET, ESGIC) -40 mg per tablet TAKE 1 TABLET BY MOUTH EVERY 4 HOURS AS NEEDED FOR PAIN 20 Tab 0    topiramate (TOPAMAX) 50 mg tablet Take 50 mg by mouth nightly.  ondansetron (ZOFRAN ODT) 8 mg disintegrating tablet Take 1 Tab by mouth every eight (8) hours as needed for Nausea. 30 Tab 1    rizatriptan (MAXALT) 10 mg tablet TAKE 1 TABLET BY MOUTH ONCE AS NEEDED FOR MIGRAINE FOR UP TO 1 DOSE. MAY REPEAT IN 2 HOURS IF NEEDED.  9 Tab 3       Past History     Past Medical History:  Past Medical History:   Diagnosis Date    Headache     Hypertension     Lumbar degenerative disc disease 2018    Other ill-defined conditions(799.89)     kidney infections    Other ill-defined conditions(799.89)     Migraines    Ovarian cyst     Ruptured ovarian cyst 2018       Past Surgical History:  Past Surgical History:   Procedure Laterality Date    HX ORTHOPAEDIC      pelvic bone fx Family History:  Family History   Problem Relation Age of Onset    No Known Problems Mother     No Known Problems Father     No Known Problems Brother        Social History:  Social History     Tobacco Use    Smoking status: Never Smoker    Smokeless tobacco: Never Used   Substance Use Topics    Alcohol use: No    Drug use: No       Allergies: Allergies   Allergen Reactions    Ciprofloxacin Hives    Pcn [Penicillins] Hives         Review of Systems   Review of Systems   Constitutional: Negative. Negative for appetite change, chills, fatigue and fever. HENT: Negative. Negative for congestion, rhinorrhea, sinus pressure and sore throat. Eyes: Negative. Respiratory: Negative. Negative for cough, choking, chest tightness, shortness of breath and wheezing. Cardiovascular: Negative. Negative for chest pain, palpitations and leg swelling. Gastrointestinal: Positive for abdominal pain (Right ). Negative for constipation, diarrhea, nausea and vomiting. Endocrine: Negative. Genitourinary: Negative for difficulty urinating, dysuria and urgency. Flank pain: right. Skin: Negative. Neurological: Negative. Negative for dizziness, speech difficulty, weakness, light-headedness, numbness and headaches. Psychiatric/Behavioral: Negative. Agitation: right flank pain    All other systems reviewed and are negative. Physical Exam   Physical Exam  Vitals signs and nursing note reviewed. Constitutional:       Appearance: She is well-developed. She is not diaphoretic. Comments: Appears uncomfortable, grimacing unable to find position of comfort     HENT:      Head: Normocephalic and atraumatic. Mouth/Throat:      Pharynx: No oropharyngeal exudate. Eyes:      Conjunctiva/sclera: Conjunctivae normal.      Pupils: Pupils are equal, round, and reactive to light. Neck:      Musculoskeletal: Normal range of motion and neck supple. Vascular: No JVD.       Trachea: No tracheal deviation. Cardiovascular:      Rate and Rhythm: Normal rate and regular rhythm. Heart sounds: Normal heart sounds. No murmur. Pulmonary:      Effort: Pulmonary effort is normal. No respiratory distress. Breath sounds: Normal breath sounds. No stridor. No wheezing or rales. Chest:      Chest wall: No tenderness. Abdominal:      General: There is no distension. Palpations: Abdomen is soft. Tenderness: There is tenderness (Right mid abdomen). There is no guarding or rebound. Comments: Gravid   Musculoskeletal: Normal range of motion. General: No tenderness. Skin:     General: Skin is warm and dry. Capillary Refill: Capillary refill takes less than 2 seconds. Neurological:      General: No focal deficit present. Mental Status: She is alert and oriented to person, place, and time. Cranial Nerves: No cranial nerve deficit. Comments: No gross motor or sensory deficits    Psychiatric:         Behavior: Behavior normal.         Diagnostic Study Results     Labs -     Recent Results (from the past 12 hour(s))   CBC WITH AUTOMATED DIFF    Collection Time: 01/04/20  9:29 AM   Result Value Ref Range    WBC 8.9 3.6 - 11.0 K/uL    RBC 3.88 3.80 - 5.20 M/uL    HGB 11.7 11.5 - 16.0 g/dL    HCT 35.5 35.0 - 47.0 %    MCV 91.5 80.0 - 99.0 FL    MCH 30.2 26.0 - 34.0 PG    MCHC 33.0 30.0 - 36.5 g/dL    RDW 13.1 11.5 - 14.5 %    PLATELET 615 457 - 878 K/uL    MPV 10.8 8.9 - 12.9 FL    NRBC 0.0 0  WBC    ABSOLUTE NRBC 0.00 0.00 - 0.01 K/uL    NEUTROPHILS 80 (H) 32 - 75 %    LYMPHOCYTES 14 12 - 49 %    MONOCYTES 5 5 - 13 %    EOSINOPHILS 1 0 - 7 %    BASOPHILS 0 0 - 1 %    IMMATURE GRANULOCYTES 0 0.0 - 0.5 %    ABS. NEUTROPHILS 7.1 1.8 - 8.0 K/UL    ABS. LYMPHOCYTES 1.2 0.8 - 3.5 K/UL    ABS. MONOCYTES 0.5 0.0 - 1.0 K/UL    ABS. EOSINOPHILS 0.1 0.0 - 0.4 K/UL    ABS. BASOPHILS 0.0 0.0 - 0.1 K/UL    ABS. IMM.  GRANS. 0.0 0.00 - 0.04 K/UL    DF AUTOMATED     METABOLIC PANEL, COMPREHENSIVE    Collection Time: 01/04/20  9:29 AM   Result Value Ref Range    Sodium 140 136 - 145 mmol/L    Potassium 3.6 3.5 - 5.1 mmol/L    Chloride 110 (H) 97 - 108 mmol/L    CO2 22 21 - 32 mmol/L    Anion gap 8 5 - 15 mmol/L    Glucose 85 65 - 100 mg/dL    BUN 5 (L) 6 - 20 MG/DL    Creatinine 0.54 (L) 0.55 - 1.02 MG/DL    BUN/Creatinine ratio 9 (L) 12 - 20      GFR est AA >60 >60 ml/min/1.73m2    GFR est non-AA >60 >60 ml/min/1.73m2    Calcium 8.6 8.5 - 10.1 MG/DL    Bilirubin, total 0.2 0.2 - 1.0 MG/DL    ALT (SGPT) 34 12 - 78 U/L    AST (SGOT) 15 15 - 37 U/L    Alk. phosphatase 38 (L) 45 - 117 U/L    Protein, total 6.6 6.4 - 8.2 g/dL    Albumin 3.0 (L) 3.5 - 5.0 g/dL    Globulin 3.6 2.0 - 4.0 g/dL    A-G Ratio 0.8 (L) 1.1 - 2.2     URINALYSIS W/ REFLEX CULTURE    Collection Time: 01/04/20 10:03 AM   Result Value Ref Range    Color YELLOW/STRAW      Appearance CLEAR CLEAR      Specific gravity 1.022 1.003 - 1.030      pH (UA) 7.0 5.0 - 8.0      Protein NEGATIVE  NEG mg/dL    Glucose NEGATIVE  NEG mg/dL    Ketone TRACE (A) NEG mg/dL    Bilirubin NEGATIVE  NEG      Blood NEGATIVE  NEG      Urobilinogen 1.0 0.2 - 1.0 EU/dL    Nitrites NEGATIVE  NEG      Leukocyte Esterase TRACE (A) NEG      WBC 5-10 0 - 4 /hpf    RBC 0-5 0 - 5 /hpf    Epithelial cells FEW FEW /lpf    Bacteria 2+ (A) NEG /hpf    UA:UC IF INDICATED URINE CULTURE ORDERED (A) CNI      Mucus TRACE (A) NEG /lpf    Hyaline cast 0-2 0 - 5 /lpf       Radiologic Studies -   MRI ABD WO CONT         US RETROPERITONEUM COMP   Final Result   IMPRESSION:    Normal renal ultrasound. No hydronephrosis. CT Results  (Last 48 hours)    None        CXR Results  (Last 48 hours)    None        MRI examination of the abdomen  Acute uncomplicated appendicitis with periappendiceal inflammation and with the  appendix measuring up to 8.5 mm in diameter. No hydronephrosis.  No gallstones or  biliary dilatation.     Preliminary report was provided by  Winifred, the on-call radiologist, at  15:45 hours on 1/4/2020. Dr. Larissa Small was informed of the findings at the time of  preliminary interpretation. Medical Decision Making   I am the first provider for this patient. I reviewed the vital signs, available nursing notes, past medical history, past surgical history, family history and social history. Vital Signs-Reviewed the patient's vital signs. Patient Vitals for the past 12 hrs:   Temp Pulse Resp BP SpO2   01/04/20 1313     100 %   01/04/20 1312    108/71    01/04/20 1215    112/71 100 %   01/04/20 1200    114/63 99 %   01/04/20 1145    111/75 100 %   01/04/20 1130    116/79 100 %   01/04/20 1115    109/67 99 %   01/04/20 1100    117/71 100 %   01/04/20 1045    109/71 96 %   01/04/20 0945    119/82 99 %   01/04/20 0933    109/75 98 %   01/04/20 0926    111/74    01/04/20 0852 97.7 °F (36.5 °C) (!) 103 20 120/72 98 %       Records Reviewed: Nursing Notes, Old Medical Records, Previous Radiology Studies and Previous Laboratory Studies    Provider Notes (Medical Decision Making):   DDx- Kidney stone, round ligament pain, UTI appendicitis      ED Course:   Initial assessment performed. The patients presenting problems have been discussed, and they are in agreement with the care plan formulated and outlined with them. I have encouraged them to ask questions as they arise throughout their visit. 0930 hrs    Bedside US performed by me, + Fetal Movement, Fluid appears adequate, Fetal Heart Rate 140's. Initially pt presentation c/w kidney stone. Pt medicated for pain, US ordered. Initially delay with US results. No Hydronephrosis seen. Pt still requiring pain medication due to severe pain as the other doses wear off. Given pain and area, will order MRI to r/o Appendicitis. Discussed with Radiology, given order stat nature will get pt over as soon as scanner available.      MRI shows appendicitis, will consult Surgery and OB/GYN    Consult Note:   Discussed with [facility], pt sees Dr. Cristel Luo. They are available and aware pt will likely go to the OR with Dr. Sri Sarah. Consult Note:   Case discussed with Dr. Sri Sarah, he will come see and evaluate patient. Critical Care Time:   CRITICAL CARE NOTE :    IMPENDING DETERIORATION -Pregnancy with Acute Appendicitis  ASSOCIATED RISK FACTORS - Emergent surgical intervention during 2nd trimester  MANAGEMENT- Bedside Assessment and Supervision of Care  INTERPRETATION -  Blood Pressure, Cardiac Output Measures, US, MRI, labs  INTERVENTIONS - IV fluids, pain meds  CASE REVIEW - Medical Sub-Specialist, Nursing and Family  TREATMENT RESPONSE -Stable  PERFORMED BY - Self    NOTES   :  I have spent 40 minutes of critical care time involved in lab review, consultations with specialist, family decision- making, bedside attention and documentation. During this entire length of time I was immediately available to the patient . Iris Beckman DO    Disposition:  Admit plan to for operative management     PLAN:  1. Admit     Diagnosis     Clinical Impression:   1. Acute appendicitis, unspecified acute appendicitis type    2. 20 weeks gestation of pregnancy        Attestations:    Iris Beckman DO    Please note that this dictation was completed with [employer], the computer voice recognition software. Quite often unanticipated grammatical, syntax, homophones, and other interpretive errors are inadvertently transcribed by the computer software. Please disregard these errors. Please excuse any errors that have escaped final proofreading. Thank you.

## 2020-01-04 NOTE — CONSULTS
Obstetrics/Gynecology Consultation    Name: Rosita Ramirez MRN: [id number]  SSN: [id number]    YOB: 1990  Age: 30 y.o. Sex: female      Subjective:     Reason for Consultation:  Acute appendicitis    History of Present Illness: Rosita Ramirez is a 30 y.o. [id number] with an estimated gestational age of 22w3d with Estimated Date of Delivery: 2020. Patient complains of right sided abdominal pain that began this morning. The pain is 7/10, sharp and stabbing. It is aggravated by movement. She denies any alleviating factors. (+) nausea without emesis. No fever. Poor appetite. She denies contractions, leakage of fluid or vaginal bleeding. (+) fetal movement. Patient denies any pregnancy complications. Routine PN care with Dr. Leonor Verma at [phone] [facility]. Rh positive.      OB History    Para Term  AB Living   1             SAB TAB Ectopic Molar Multiple Live Births                    # Outcome Date GA Lbr Eduardo/2nd Weight Sex Delivery Anes PTL Lv   1 Current              Past Medical History:   Diagnosis Date    Headache     Hypertension     Lumbar degenerative disc disease 2018    Other ill-defined conditions(799.89)     kidney infections    Other ill-defined conditions(799.89)     Migraines    Ovarian cyst     Ruptured ovarian cyst 2018     Past Surgical History:   Procedure Laterality Date    HX ORTHOPAEDIC      pelvic bone fx     Social History     Occupational History    Not on file   Tobacco Use    Smoking status: Former Smoker    Smokeless tobacco: Never Used    Tobacco comment: Very rarely   Substance and Sexual Activity    Alcohol use: No    Drug use: No    Sexual activity: Yes     Birth control/protection: Injection     Family History   Problem Relation Age of Onset    No Known Problems Mother     No Known Problems Father     No Known Problems Brother        Allergies   Allergen Reactions    Ciprofloxacin Hives    Pcn [Penicillins] Hives     Prior to Admission medications    Medication Sig Start Date End Date Taking? Authorizing Provider   PNV No12-Iron-FA-DSS-OM-3 29 mg iron-1 mg -50 mg CPKD Take 1 Cap by mouth daily. Yes Provider, Historical   acetaminophen (TYLENOL) 325 mg tablet Take 325 mg by mouth every four (4) hours as needed for Pain. Yes Provider, Historical   butalbital-acetaminophen-caffeine (FIORICET, ESGIC) -40 mg per tablet TAKE 1 TABLET BY MOUTH EVERY 4 HOURS AS NEEDED FOR PAIN 11/18/19  Yes Pamela Goldsmith NP   topiramate (TOPAMAX) 50 mg tablet Take 50 mg by mouth nightly. 6/6/18   Provider, Historical   ondansetron (ZOFRAN ODT) 8 mg disintegrating tablet Take 1 Tab by mouth every eight (8) hours as needed for Nausea. 2/19/19   Pamela Goldsmith NP   rizatriptan (MAXALT) 10 mg tablet TAKE 1 TABLET BY MOUTH ONCE AS NEEDED FOR MIGRAINE FOR UP TO 1 DOSE. MAY REPEAT IN 2 HOURS IF NEEDED. 11/18/18   Kylie Ortiz MD        Review of Systems   Constitutional: Positive for appetite change (decreased). HENT: Negative. Eyes: Negative. Respiratory: Negative. Cardiovascular: Negative. Gastrointestinal: Positive for abdominal pain (right lateral) and nausea. Endocrine: Negative. Genitourinary: Negative. Negative for decreased urine volume, difficulty urinating, dyspareunia, dysuria, enuresis, flank pain, frequency, genital sores, hematuria, menstrual problem, pelvic pain, urgency, vaginal bleeding, vaginal discharge and vaginal pain. Musculoskeletal: Negative. Skin: Negative. Allergic/Immunologic: Negative. Neurological: Negative. Hematological: Negative. Psychiatric/Behavioral: Negative. All other systems reviewed and are negative.       Objective:     Visit Vitals  /73 (BP 1 Location: Left arm, BP Patient Position: At rest)   Pulse 71   Temp 98 °F (36.7 °C)   Resp 18   Ht 5' 6\" (1.676 m)   Wt 80.5 kg (177 lb 7.5 oz)   LMP 08/23/2019   SpO2 100%   BMI 28.64 kg/m²       Physical Exam  Constitutional: General: Appears uncomfortable. Appearance: She is well-developed. She is not diaphoretic. HENT:      Head: Normocephalic and atraumatic. Neck:      Musculoskeletal: Normal range of motion and neck supple. Cardiovascular:      Rate and Rhythm: Normal rate and regular rhythm. Heart sounds: Normal heart sounds. No murmur. No friction rub. No gallop. Pulmonary:      Effort: Pulmonary effort is normal. No respiratory distress. Breath sounds: Normal breath sounds. No wheezing or rales. Chest:      Chest wall: No tenderness. Abdominal:      General: Bowel sounds are normal. There is no distension. Palpations: Abdomen is soft. There is no mass. Tenderness: There is tenderness (right ). There is no guarding or rebound. Comments: Gravid, fundus U-4     Musculoskeletal: Normal range of motion. General: No tenderness or deformity. Skin:     General: Skin is warm and dry. Findings: No erythema. Neurological:      Mental Status: She is alert and oriented to person, place, and time. Psychiatric:         Behavior: Behavior normal.         Fetal Heart Rate: 140s by doppler      MRI abdomen:  *PRELIMINARY REPORT*     Acute uncomplicated appendicitis with periappendiceal inflammation and with the  appendix measuring up to 8.5 mm in diameter. No hydronephrosis. No gallstones or  biliary dilatation.     Preliminary report was provided by Dr. Luisa Reyes, the on-call radiologist, at  15:45 hours on 1/4/2020. Dr. Jose Suarez was informed of the findings at the time of  preliminary interpretation.     Final report to follow. *END PRELIMINARY REPORT*    Renal US:  EXAM:  US RETROPERITONEUM COMP     INDICATION: Sharp stabbing right flank pain, r/o kidney stone, 20 weeks     COMPARISON:  Ultrasound and CT of 5/20/2018     TECHNIQUE:   Routine ultrasound images of the kidneys and retroperitoneum were obtained.     FINDINGS:   The right kidney measures 11.3 cm in length.  The left kidney measures 11.1 cm in  length. Both kidneys demonstrate normal cortical echogenicity. No renal calculus is  seen. There is no hydronephrosis. No renal cyst or solid mass is evident.       The abdominal aorta, common iliac arteries, and IVC as well as the other  abdominal structures were not seen because of gas and the patient's body  habitus. .      The urinary bladder was nondistended.     IMPRESSION  IMPRESSION:   Normal renal ultrasound. No hydronephrosis. Labs:   Recent Results (from the past 24 hour(s))   CBC WITH AUTOMATED DIFF    Collection Time: 01/04/20  9:29 AM   Result Value Ref Range    WBC 8.9 3.6 - 11.0 K/uL    RBC 3.88 3.80 - 5.20 M/uL    HGB 11.7 11.5 - 16.0 g/dL    HCT 35.5 35.0 - 47.0 %    MCV 91.5 80.0 - 99.0 FL    MCH 30.2 26.0 - 34.0 PG    MCHC 33.0 30.0 - 36.5 g/dL    RDW 13.1 11.5 - 14.5 %    PLATELET 968 514 - 498 K/uL    MPV 10.8 8.9 - 12.9 FL    NRBC 0.0 0  WBC    ABSOLUTE NRBC 0.00 0.00 - 0.01 K/uL    NEUTROPHILS 80 (H) 32 - 75 %    LYMPHOCYTES 14 12 - 49 %    MONOCYTES 5 5 - 13 %    EOSINOPHILS 1 0 - 7 %    BASOPHILS 0 0 - 1 %    IMMATURE GRANULOCYTES 0 0.0 - 0.5 %    ABS. NEUTROPHILS 7.1 1.8 - 8.0 K/UL    ABS. LYMPHOCYTES 1.2 0.8 - 3.5 K/UL    ABS. MONOCYTES 0.5 0.0 - 1.0 K/UL    ABS. EOSINOPHILS 0.1 0.0 - 0.4 K/UL    ABS. BASOPHILS 0.0 0.0 - 0.1 K/UL    ABS. IMM.  GRANS. 0.0 0.00 - 0.04 K/UL    DF AUTOMATED     METABOLIC PANEL, COMPREHENSIVE    Collection Time: 01/04/20  9:29 AM   Result Value Ref Range    Sodium 140 136 - 145 mmol/L    Potassium 3.6 3.5 - 5.1 mmol/L    Chloride 110 (H) 97 - 108 mmol/L    CO2 22 21 - 32 mmol/L    Anion gap 8 5 - 15 mmol/L    Glucose 85 65 - 100 mg/dL    BUN 5 (L) 6 - 20 MG/DL    Creatinine 0.54 (L) 0.55 - 1.02 MG/DL    BUN/Creatinine ratio 9 (L) 12 - 20      GFR est AA >60 >60 ml/min/1.73m2    GFR est non-AA >60 >60 ml/min/1.73m2    Calcium 8.6 8.5 - 10.1 MG/DL    Bilirubin, total 0.2 0.2 - 1.0 MG/DL    ALT (SGPT) 34 12 - 78 U/L    AST (SGOT) 15 15 - 37 U/L    Alk. phosphatase 38 (L) 45 - 117 U/L    Protein, total 6.6 6.4 - 8.2 g/dL    Albumin 3.0 (L) 3.5 - 5.0 g/dL    Globulin 3.6 2.0 - 4.0 g/dL    A-G Ratio 0.8 (L) 1.1 - 2.2     URINALYSIS W/ REFLEX CULTURE    Collection Time: 01/04/20 10:03 AM   Result Value Ref Range    Color YELLOW/STRAW      Appearance CLEAR CLEAR      Specific gravity 1.022 1.003 - 1.030      pH (UA) 7.0 5.0 - 8.0      Protein NEGATIVE  NEG mg/dL    Glucose NEGATIVE  NEG mg/dL    Ketone TRACE (A) NEG mg/dL    Bilirubin NEGATIVE  NEG      Blood NEGATIVE  NEG      Urobilinogen 1.0 0.2 - 1.0 EU/dL    Nitrites NEGATIVE  NEG      Leukocyte Esterase TRACE (A) NEG      WBC 5-10 0 - 4 /hpf    RBC 0-5 0 - 5 /hpf    Epithelial cells FEW FEW /lpf    Bacteria 2+ (A) NEG /hpf    UA:UC IF INDICATED URINE CULTURE ORDERED (A) CNI      Mucus TRACE (A) NEG /lpf    Hyaline cast 0-2 0 - 5 /lpf       Assessment and Plan:     Principal Problem:    Acute appendicitis complicating pregnancy (7/6/8249)    31 y/o [id number] @ 20w2d  Acute appendicitis  Admitted by Dr. Mariza Hudson for outpatient appendectomy  Recommend pre- and post-op fetal heart rate assessment by doppler  Recommend left lateral OR positioning, careful trocar placement and mechanical DVT prophylaxis    Spent 30 minutes with patient. Greater than 50% of time was spent counseling and coordinating care.

## 2020-01-04 NOTE — PERIOP NOTES
Handoff Report from Operating Room to PACU    Report received from Khadijah Geiger and Shayna Mccullough regarding Quan Chaudhryr. Surgeon(s):  Gilberto George MD  And Procedure(s) (LRB):  LAPAROSCOPIC APPENDECTOMY (N/A)  confirmed   with allergies and dressings discussed. Anesthesia type, drugs, patient history, complications, estimated blood loss, vital signs, intake and output, and last pain medication and reversal medications were reviewed.

## 2020-01-04 NOTE — H&P
Surgery History and Physcial    Subjective:      Kyleigh Whiteside is a 30 y.o. female who presents for evaluation of abdominal pain. The pain is located in the right lateral abdomen without radiation. Pain is described as sharp and stabbing and measures 7/10 in intensity. Onset of pain was 10 hours ago. Aggravating factors include movement and riding over bumps in the car. Alleviating factors include none. Associated symptoms include anorexia. Pt has 20 week intrauterine pregnancy. 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     Past Medical History:   Diagnosis Date    Headache     Hypertension     Lumbar degenerative disc disease 2018    Other ill-defined conditions(799.89)     kidney infections    Other ill-defined conditions(799.89)     Migraines    Ovarian cyst     Ruptured ovarian cyst 2018      Past Surgical History:   Procedure Laterality Date    HX ORTHOPAEDIC      pelvic bone fx      Social History     Tobacco Use    Smoking status: Never Smoker    Smokeless tobacco: Never Used   Substance Use Topics    Alcohol use: No      Family History   Problem Relation Age of Onset    No Known Problems Mother     No Known Problems Father     No Known Problems Brother       Prior to Admission medications    Medication Sig Start Date End Date Taking? Authorizing Provider   butalbital-acetaminophen-caffeine (FIORICET, ESGIC) -40 mg per tablet TAKE 1 TABLET BY MOUTH EVERY 4 HOURS AS NEEDED FOR PAIN 11/18/19   Debi Cruz NP   topiramate (TOPAMAX) 50 mg tablet Take 50 mg by mouth nightly.  6/6/18 Provider, Historical   ondansetron (ZOFRAN ODT) 8 mg disintegrating tablet Take 1 Tab by mouth every eight (8) hours as needed for Nausea. 2/19/19   Miranda Alcantara NP   rizatriptan (MAXALT) 10 mg tablet TAKE 1 TABLET BY MOUTH ONCE AS NEEDED FOR MIGRAINE FOR UP TO 1 DOSE. MAY REPEAT IN 2 HOURS IF NEEDED. 11/18/18   Narcisa Khan MD     Allergies   Allergen Reactions    Ciprofloxacin Hives    Pcn [Penicillins] Hives         Review of Systems   Constitutional: Positive for appetite change. Negative for chills, diaphoresis and fever. Respiratory: Negative for shortness of breath and wheezing. Cardiovascular: Negative for chest pain and palpitations. Gastrointestinal: Positive for abdominal pain. Negative for diarrhea, nausea and vomiting. Musculoskeletal: Negative for myalgias. Hematological: Does not bruise/bleed easily. Objective:     Visit Vitals  /71   Pulse (!) 103   Temp 97.7 °F (36.5 °C)   Resp 20   Ht 5' 6\" (1.676 m)   Wt 177 lb 7.5 oz (80.5 kg)   SpO2 100%   BMI 28.64 kg/m²       Physical Exam  Constitutional:       General: She is not in acute distress. Appearance: She is well-developed. HENT:      Head: Normocephalic and atraumatic. Cardiovascular:      Rate and Rhythm: Normal rate and regular rhythm. Heart sounds: Normal heart sounds. Pulmonary:      Breath sounds: Normal breath sounds. No wheezing or rales. Abdominal:      General: Bowel sounds are normal. There is no distension. Palpations: Abdomen is soft. There is no hepatomegaly, splenomegaly or mass. Tenderness: There is tenderness in the right upper quadrant. There is no guarding or rebound. Negative signs include Navarro's sign, Rovsing's sign, McBurney's sign and psoas sign. Hernia: No hernia is present. Musculoskeletal: Normal range of motion. Lymphadenopathy:      Cervical: No cervical adenopathy.          Imaging:  images and reports reviewed    Lab Review:    Recent Results (from the past 24 hour(s))   CBC WITH AUTOMATED DIFF    Collection Time: 01/04/20  9:29 AM   Result Value Ref Range    WBC 8.9 3.6 - 11.0 K/uL    RBC 3.88 3.80 - 5.20 M/uL    HGB 11.7 11.5 - 16.0 g/dL    HCT 35.5 35.0 - 47.0 %    MCV 91.5 80.0 - 99.0 FL    MCH 30.2 26.0 - 34.0 PG    MCHC 33.0 30.0 - 36.5 g/dL    RDW 13.1 11.5 - 14.5 %    PLATELET 634 166 - 272 K/uL    MPV 10.8 8.9 - 12.9 FL    NRBC 0.0 0  WBC    ABSOLUTE NRBC 0.00 0.00 - 0.01 K/uL    NEUTROPHILS 80 (H) 32 - 75 %    LYMPHOCYTES 14 12 - 49 %    MONOCYTES 5 5 - 13 %    EOSINOPHILS 1 0 - 7 %    BASOPHILS 0 0 - 1 %    IMMATURE GRANULOCYTES 0 0.0 - 0.5 %    ABS. NEUTROPHILS 7.1 1.8 - 8.0 K/UL    ABS. LYMPHOCYTES 1.2 0.8 - 3.5 K/UL    ABS. MONOCYTES 0.5 0.0 - 1.0 K/UL    ABS. EOSINOPHILS 0.1 0.0 - 0.4 K/UL    ABS. BASOPHILS 0.0 0.0 - 0.1 K/UL    ABS. IMM. GRANS. 0.0 0.00 - 0.04 K/UL    DF AUTOMATED     METABOLIC PANEL, COMPREHENSIVE    Collection Time: 01/04/20  9:29 AM   Result Value Ref Range    Sodium 140 136 - 145 mmol/L    Potassium 3.6 3.5 - 5.1 mmol/L    Chloride 110 (H) 97 - 108 mmol/L    CO2 22 21 - 32 mmol/L    Anion gap 8 5 - 15 mmol/L    Glucose 85 65 - 100 mg/dL    BUN 5 (L) 6 - 20 MG/DL    Creatinine 0.54 (L) 0.55 - 1.02 MG/DL    BUN/Creatinine ratio 9 (L) 12 - 20      GFR est AA >60 >60 ml/min/1.73m2    GFR est non-AA >60 >60 ml/min/1.73m2    Calcium 8.6 8.5 - 10.1 MG/DL    Bilirubin, total 0.2 0.2 - 1.0 MG/DL    ALT (SGPT) 34 12 - 78 U/L    AST (SGOT) 15 15 - 37 U/L    Alk.  phosphatase 38 (L) 45 - 117 U/L    Protein, total 6.6 6.4 - 8.2 g/dL    Albumin 3.0 (L) 3.5 - 5.0 g/dL    Globulin 3.6 2.0 - 4.0 g/dL    A-G Ratio 0.8 (L) 1.1 - 2.2     URINALYSIS W/ REFLEX CULTURE    Collection Time: 01/04/20 10:03 AM   Result Value Ref Range    Color YELLOW/STRAW      Appearance CLEAR CLEAR      Specific gravity 1.022 1.003 - 1.030      pH (UA) 7.0 5.0 - 8.0      Protein NEGATIVE  NEG mg/dL    Glucose NEGATIVE  NEG mg/dL    Ketone TRACE (A) NEG mg/dL    Bilirubin NEGATIVE  NEG      Blood NEGATIVE  NEG      Urobilinogen 1.0 0.2 - 1.0 EU/dL    Nitrites NEGATIVE  NEG      Leukocyte Esterase TRACE (A) NEG      WBC 5-10 0 - 4 /hpf    RBC 0-5 0 - 5 /hpf    Epithelial cells FEW FEW /lpf    Bacteria 2+ (A) NEG /hpf    UA:UC IF INDICATED URINE CULTURE ORDERED (A) CNI      Mucus TRACE (A) NEG /lpf    Hyaline cast 0-2 0 - 5 /lpf         Assessment:     Abdominal pain, suspect acute appendicitis with positive MRI. Discussed with Dr. Michelle Sheth who recommends pre and post procedure fetal heart tone spot check. No need for postop monitoring on L&D unless she has a problem. Plan:     1. I recommend proceeding with Surgery:  Appendectomy and Laparoscopy. 2. Discussed aspects of surgical intervention, methods, risks including by not limited to infection, bleeding, hematoma, and perforation of the intestines or solid organs, conversion to open operation, negative exploration, possible need for drain placement, possible  labor or fetal demise, and the risks of general anesthetic. The patient understands the risks; any and all questions were answered to the patient's satisfaction.

## 2020-01-04 NOTE — ED NOTES
TRANSFER - OUT REPORT:    Verbal report given to [employer] (name) on [facility]  being transferred to OR(unit) for routine progression of care       Report consisted of patients Situation, Background, Assessment and   Recommendations(SBAR). Information from the following report(s) SBAR, Kardex, ED Summary and Procedure Summary was reviewed with the receiving nurse. Lines:   Peripheral IV 01/04/20 Left Antecubital (Active)   Site Assessment Clean, dry, & intact 1/4/2020  9:32 AM   Phlebitis Assessment 0 1/4/2020  9:32 AM   Infiltration Assessment 0 1/4/2020  9:32 AM   Dressing Status Clean, dry, & intact 1/4/2020  9:32 AM   Dressing Type Transparent;Tape 1/4/2020  9:32 AM   Hub Color/Line Status Pink;Patent; Flushed;Capped 1/4/2020  9:32 AM   Action Taken Catheter retaped;Blood drawn 1/4/2020  9:32 AM   Alcohol Cap Used Yes 1/4/2020  9:32 AM        Opportunity for questions and clarification was provided.       Patient transported with:   Registered Nurse  Tech

## 2020-01-04 NOTE — OP NOTES
OPERATION        DATE OF OPERATION:  1/4/2020    PREOPERATIVE DIAGNOSIS:  Acute Appendicitis    POSTOPERATIVE DIAGNOSIS:  Early Acute Appendicitis    PROCEDURE:   Laparoscopic Appendectomy, Enterolysis and excision of right fallopian tube cyst    SURGEON:  Kelly Contreras MD, FACS. ANESTHESIA:  General Endotrachial Anesthesia    FINDINGS:  Mildly prominent appendix with hyperemia, adhesions from cecum to right lateral abdominal wall, right fallopian tube 1 cm cyst    SPECIMENS REMOVED:  Appendix and Mesoappendix, right fallopian tube cyst    DESCRIPTION OF OPERATION:  After appropriate consent was obtained, the patient who was competent was brought to the operating room, made comfortable in the supine position, and administered general endotracheal anesthesia. Miranda catheter was placed, and the patient was prepped and draped in standard fashion. A 0.5% Marcaine solution was used to infiltrate the planned trocar sites. A 15 blade was then used to incise just above the umbilicus and this was extended sharply down to and through the midline fascia. A Hall trocar was placed, and the abdominal cavity was insufflated with CO2 gas to 15 cm water pressure. Under direct visualization, two 5 mm trocars were placed in the lower abdomen with care to avoid the 20 week gravid uterus, which was 4 cm inferior to the umbilicus. Using standard laparoscopic technique, the abdominal cavity was explored. The appendix was identified and found to be mildly enlarged and inflamed. The base of the appendix was identified, and a window was made in the mesentery at the appendiceal base. An Endo-SHU vascular staple cartridge was then fired across the appendix at the base, ligating and dividing it. A Ligasure device was used to divide the mesoappendix; and when this was all freed, the appendix was placed in an endoscopic retrieval bag and withdrawn from the abdomen through the umbilical trocar site.  The staple lines was inspected and found to be hemostatic. A 1 cm right fallopian tube cyst was excised with the Ligasure device and withdrawn from the Sonda Sami trocar. Adhesions from the cecum to the right lateral abdominal wall were taken down with the Ligasure device. The abdominal cavity was further explored, and no other pathologic findings noted, particularly there were no fluid collections and no evidence of a Meckel's diverticulum. The trocars were removed under direct visualization without evidence of bleeding. The umbilical fascial defect was approximated with 0-Vicryl suture, and the skin incisions were closed with 5-0 Monocryl subcuticular stitch. The wounds were cleaned and dried and dressed with Dermabond. The patient was awakened, extubated, and transferred to the recovery room in good condition. There were no operative complications. There was minimal blood loss during the case. Jose Godfrey MD, [facility]

## 2020-01-04 NOTE — ANESTHESIA PREPROCEDURE EVALUATION
Relevant Problems   No relevant active problems       Anesthetic History   No history of anesthetic complications            Review of Systems / Medical History  Patient summary reviewed, nursing notes reviewed and pertinent labs reviewed    Pulmonary  Within defined limits                 Neuro/Psych         Headaches    Comments: Cervicalgia   Migraines  Lumbar DDD with radiculopathy Cardiovascular    Hypertension              Exercise tolerance: >4 METS     GI/Hepatic/Renal               Comments: Appendicitis Endo/Other        Arthritis     Other Findings   Comments: Pregnant @ 20 weeks         Physical Exam    Airway  Mallampati: I  TM Distance: > 6 cm  Neck ROM: normal range of motion   Mouth opening: Normal     Cardiovascular  Regular rate and rhythm,  S1 and S2 normal,  no murmur, click, rub, or gallop             Dental  No notable dental hx       Pulmonary  Breath sounds clear to auscultation               Abdominal  GI exam deferred       Other Findings            Anesthetic Plan    ASA: 2, emergent  Anesthesia type: general and total IV anesthesia    Monitoring Plan: BIS      Induction: Intravenous and RSI  Anesthetic plan and risks discussed with: Patient      Pre- and postoperative fetal monitoring.

## 2020-01-05 VITALS
OXYGEN SATURATION: 99 % | WEIGHT: 177.47 LBS | DIASTOLIC BLOOD PRESSURE: 68 MMHG | RESPIRATION RATE: 19 BRPM | HEIGHT: 66 IN | TEMPERATURE: 97.7 F | HEART RATE: 74 BPM | BODY MASS INDEX: 28.52 KG/M2 | SYSTOLIC BLOOD PRESSURE: 111 MMHG

## 2020-01-05 LAB
BACTERIA SPEC CULT: NORMAL
CC UR VC: NORMAL
SERVICE CMNT-IMP: NORMAL

## 2020-01-05 PROCEDURE — 99218 HC RM OBSERVATION: CPT

## 2020-01-05 PROCEDURE — 74011250636 HC RX REV CODE- 250/636: Performed by: SURGERY

## 2020-01-05 RX ORDER — HYDROCODONE BITARTRATE AND ACETAMINOPHEN 5; 325 MG/1; MG/1
1 TABLET ORAL
Qty: 12 TAB | Refills: 0 | Status: SHIPPED | OUTPATIENT
Start: 2020-01-05 | End: 2020-01-08

## 2020-01-05 RX ADMIN — HYDROMORPHONE HYDROCHLORIDE 0.5 MG: 1 INJECTION, SOLUTION INTRAMUSCULAR; INTRAVENOUS; SUBCUTANEOUS at 02:55

## 2020-01-05 RX ADMIN — ONDANSETRON 4 MG: 2 INJECTION INTRAMUSCULAR; INTRAVENOUS at 02:55

## 2020-01-05 RX ADMIN — Medication 10 ML: at 06:50

## 2020-01-05 RX ADMIN — HYDROMORPHONE HYDROCHLORIDE 0.5 MG: 1 INJECTION, SOLUTION INTRAMUSCULAR; INTRAVENOUS; SUBCUTANEOUS at 06:50

## 2020-01-05 NOTE — PROGRESS NOTES
Problem: Falls - Risk of  Goal: *Absence of Falls  Description  Document Mickie Gant Fall Risk and appropriate interventions in the flowsheet.   Outcome: Progressing Towards Goal  Note: Fall Risk Interventions:            Medication Interventions: Teach patient to arise slowly, Patient to call before getting OOB                   Problem: Patient Education: Go to Patient Education Activity  Goal: Patient/Family Education  Outcome: Progressing Towards Goal

## 2020-01-05 NOTE — DISCHARGE INSTRUCTIONS
Appendectomy: What to Expect at Home  Your Recovery     Your doctor removed your appendix either by making many small cuts, called incisions, in your belly (laparoscopic surgery) or through open surgery. In open surgery, the doctor makes one large incision. The incisions leave scars that usually fade over time. After your surgery, it is normal to feel weak and tired for several days after you return home. Your belly may be swollen and may be painful. If you had laparoscopic surgery, you may have pain in your shoulder for about 24 hours. You may also feel sick to your stomach and have diarrhea, constipation, gas, or a headache. This usually goes away in a few days. Your recovery time depends on the type of surgery you had. If you had laparoscopic surgery, you will probably be able to return to work or a normal routine 1 to 3 weeks after surgery. If you had an open surgery, it may take 2 to 4 weeks. If your appendix ruptured, you may have a drain in your incision. Your body will work fine without an appendix. You will not have to make any changes in your diet or lifestyle. This care sheet gives you a general idea about how long it will take for you to recover. But each person recovers at a different pace. Follow the steps below to get better as quickly as possible. How can you care for yourself at home? Activity  · Rest when you feel tired. Getting enough sleep will help you recover. · Try to walk each day. Start by walking a little more than you did the day before. Bit by bit, increase the amount you walk. Walking boosts blood flow and helps prevent pneumonia and constipation. · For about 2 weeks, avoid lifting anything that would make you strain. This may include a child, heavy grocery bags and milk containers, a heavy briefcase or backpack, cat litter or dog food bags, or a vacuum .   · Avoid strenuous activities, such as bicycle riding, jogging, weight lifting, or aerobic exercise, until your doctor says it is okay. · You may be able to take showers (unless you have a drain near your incision) 24 to 48 hours after surgery. Pat the incision dry. Do not take a bath for the first 2 weeks, or until your doctor tells you it is okay. If you have a drain near your incision, follow your doctor's instructions. · You may drive when you are no longer taking pain medicine and can quickly move your foot from the gas pedal to the brake. You must also be able to sit comfortably for a long period of time, even if you do not plan on going far. You might get caught in traffic. · You will probably be able to go back to work in 1 to 3 weeks. If you had an open surgery, it may take 3 to 4 weeks. · Your doctor will tell you when you can have sex again. Diet  · You can eat your normal diet. If your stomach is upset, try bland, low-fat foods like plain rice, broiled chicken, toast, and yogurt. · Drink plenty of fluids (unless your doctor tells you not to). · You may notice that your bowel movements are not regular right after your surgery. This is common. Try to avoid constipation and straining with bowel movements. You may want to take a fiber supplement every day. If you have not had a bowel movement after a couple of days, ask your doctor about taking a mild laxative. Medicines  · If your appendix ruptured, you will need to take antibiotics. Take them as directed. Do not stop taking them just because you feel better. You need to take the full course of antibiotics. · Be safe with medicines. Take pain medicines exactly as directed. ¨ If the doctor gave you a prescription medicine for pain, take it as prescribed. ¨ If you are not taking a prescription pain medicine, take an over-the-counter medicine such as acetaminophen (Tylenol), ibuprofen (Advil, Motrin), or naproxen (Aleve). Read and follow all instructions on the label. ¨ Do not take two or more pain medicines at the same time unless the doctor told you to. Many pain medicines have acetaminophen, which is Tylenol. Too much Tylenol can be harmful. · If you think your pain medicine is making you sick to your stomach:  ¨ Take your medicine after meals (unless your doctor has told you not to). ¨ Ask your doctor for a different pain medicine. Incision care  · If you had an open surgery, you may have staples in your incision. The doctor will take these out in 7 to 10 days. · If you have strips of tape on the incision, leave the tape on for a week or until it falls off. · You may wash the area with warm, soapy water 24 to 48 hours after your surgery, unless your doctor tells you not to. Pat the area dry. · Keep the area clean and dry. You may cover it with a gauze bandage if it weeps or rubs against clothing. Change the bandage every day. · If your appendix ruptured, you may have an incision with packing in it. Change the packing as often as your doctor tells you to. ¨ Packing changes may hurt at first. Taking pain medicine about half an hour before you change the dressing can help. ¨ If your dressing sticks to your wound, try soaking it with warm water for about 10 minutes before you remove it. You can do this in the shower or by placing a wet washcloth over the dressing. ¨ Remove the old packing and flush the incision with water. Gently pat the top area dry. ¨ The size of the incision determines how much gauze you need to put inside. Fold the gauze over once, but do not wad it up so that it hurts. Put it in the wound carefully. You want to keep the sides of the wound from touching. A cotton swab may help you push the gauze in as needed. ¨ Put a gauze pad over the wound, and tape it down. ¨ You may notice greenish gray fluid seeping from your wound as you start to heal. This is normal. It is a sign that your wound is healing. Follow-up care is a key part of your treatment and safety.  Be sure to make and go to all appointments, and call your doctor if you are having problems. It's also a good idea to know your test results and keep a list of the medicines you take. When should you call for help? Call 911 anytime you think you may need emergency care. For example, call if:  · You passed out (lost consciousness). · You have sudden chest pain and shortness of breath, or you cough up blood. · You have severe trouble breathing. Call your doctor now or seek immediate medical care if:  · You are sick to your stomach and cannot drink fluids. · You have severe diarrhea. · You have pain that does not get better when you take your pain medicine. · You have signs of infection, such as:  ¨ Increased pain, swelling, warmth, or redness. ¨ Red streaks leading from the wound. ¨ Pus draining from the wound. ¨ A fever. · You have loose stitches, or your incision comes open. · Bright red blood has soaked through a large bandage. · You have signs of a blood clot, such as:  ¨ Pain in your calf, back of knee, thigh, or groin. ¨ Redness and swelling in your leg or groin. Watch closely for any changes in your health, and be sure to contact your doctor if:  · You had a laparoscopic surgery and your shoulder pain lasts more than 24 hours. · You have leakage around your drain or you have no new fluid in the drain for 24 hours. · The amount of drainage suddenly increases, or the color and texture changes. · You do not have a bowel movement after taking a laxative. Where can you learn more? Go to [employer]  Enter X801 in the search box to learn more about \"Appendectomy: What to Expect at Home. \"   © 2088-0118 [employer]. Care instructions adapted under license by [facility] (which disclaims liability or warranty for this information).  This care instruction is for use with your licensed healthcare professional. If you have questions about a medical condition or this instruction, always ask your healthcare professional. [employer] disclaims any warranty or liability for your use of this information.   Content Version: 62.3.236460; Current as of: November 14, 2014
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
## 2020-01-05 NOTE — PROGRESS NOTES
Right EJ PIV removed (not recorded in flow sheets upon insertion). Manual pressure applied for 3 minutes, covered with 4.x4 and occlusive dressing which is CDI. Discharge instructions reviewed with pt and family member at bedside, no questions or concerns were shared.

## 2020-01-05 NOTE — DISCHARGE SUMMARY
Physician Discharge Summary     Patient ID:  Aidan Mancia  [id number]  32 y.o.  1990    Admit Date: 1/4/2020    Discharge Date: 1/5/2020    Admission Diagnoses: Acute appendicitis complicating pregnancy [E57.176, K35.80]    Discharge Diagnoses:  Principal Problem:    Acute appendicitis complicating pregnancy (7/7/4354)         Admission Condition: Poor    Discharge Condition: Good    Last Procedure: Procedure(s):  LAPAROSCOPIC APPENDECTOMY, ENTEROLYSIS AND RIGHT [address]  Course:   Pt with 20 week IUP presenting with RLQ pain and MRI findings c/w acute appendicitis. FHR spot checks okay and pt underwent lap appendectomy, lysis of adhesions at cecum and excision of right fallopian tube cyst.  No complications and feels ready for d/c home this morning. Consults: OB Hospitalist service    Disposition: home    Patient Instructions:   Current Discharge Medication List      START taking these medications    Details   HYDROcodone-acetaminophen (NORCO) 5-325 mg per tablet Take 1 Tab by mouth every four (4) hours as needed for Pain for up to 3 days. Max Daily Amount: 6 Tabs. Qty: 12 Tab, Refills: 0    Associated Diagnoses: Acute appendicitis complicating pregnancy         CONTINUE these medications which have NOT CHANGED    Details   PNV No12-Iron-FA-DSS-OM-3 29 mg iron-1 mg -50 mg CPKD Take 1 Cap by mouth daily. acetaminophen (TYLENOL) 325 mg tablet Take 325 mg by mouth every four (4) hours as needed for Pain. butalbital-acetaminophen-caffeine (FIORICET, ESGIC) -40 mg per tablet TAKE 1 TABLET BY MOUTH EVERY 4 HOURS AS NEEDED FOR PAIN  Qty: 20 Tab, Refills: 0    Comments: Please consider 90 day supplies to promote better adherence  Associated Diagnoses: Intractable migraine without aura and with status migrainosus      topiramate (TOPAMAX) 50 mg tablet Take 50 mg by mouth nightly.       ondansetron (ZOFRAN ODT) 8 mg disintegrating tablet Take 1 Tab by mouth every eight (8) hours as needed for Nausea. Qty: 30 Tab, Refills: 1    Associated Diagnoses: Intractable migraine without aura and with status migrainosus      rizatriptan (MAXALT) 10 mg tablet TAKE 1 TABLET BY MOUTH ONCE AS NEEDED FOR MIGRAINE FOR UP TO 1 DOSE. MAY REPEAT IN 2 HOURS IF NEEDED. Qty: 9 Tab, Refills: 3    Comments: Please consider 90 day supplies to promote better adherence           Activity: No driving while on analgesics and No heavy lifting for 4 weeks  Diet: Regular Diet  Wound Care: Keep wound clean and dry    Follow-up with Dr. Marylu Tony in 2 weeks.   Follow-up tests/labs none    Signed:  Imtiaz Jackson MD  [facility]  1/5/2020  7:35 AM

## 2020-01-05 NOTE — PROGRESS NOTES
End of shift    Uneventful shift  Pnt ambulated from bed to bathroom several times without difficulty  No BM during shift  Medicated for pain and nausea as needed; tolerated well  Pnt stable  NADN

## 2020-01-05 NOTE — ANESTHESIA POSTPROCEDURE EVALUATION
Procedure(s):  LAPAROSCOPIC APPENDECTOMY, ENTEROLYSIS AND RIGHT FALLOPIAN TUBE CYST REMOVAL. general, total IV anesthesia    Anesthesia Post Evaluation        Patient location during evaluation: PACU  Note status: Adequate. Level of consciousness: responsive to verbal stimuli and sleepy but conscious  Pain management: satisfactory to patient  Airway patency: patent  Anesthetic complications: no  Cardiovascular status: acceptable  Respiratory status: acceptable  Hydration status: acceptable  Comments: +Post-Anesthesia Evaluation and Assessment    Patient: Ghassan Jenkins MRN: [id number]  SSN: [id number]   YOB: 1990  Age: 30 y.o. Sex: female      Cardiovascular Function/Vital Signs    /52   Pulse 86   Temp 36.8 °C (98.2 °F)   Resp 24   Ht 5' 6\" (1.676 m)   Wt 80.5 kg (177 lb 7.5 oz)   SpO2 100%   BMI 28.64 kg/m²     Patient is status post Procedure(s):  LAPAROSCOPIC APPENDECTOMY, ENTEROLYSIS AND RIGHT FALLOPIAN TUBE CYST REMOVAL. Nausea/Vomiting: Controlled. Postoperative hydration reviewed and adequate. Pain:  Pain Scale 1: Numeric (0 - 10) (01/04/20 1853)  Pain Intensity 1: 0 (01/04/20 1853)   Managed. Neurological Status:   Neuro (WDL): Within Defined Limits (01/04/20 1730)   At baseline. Mental Status and Level of Consciousness: Arousable. Pulmonary Status:   O2 Device: Nasal cannula (01/04/20 1850)   Adequate oxygenation and airway patent. Complications related to anesthesia: None    Post-anesthesia assessment completed. No concerns. Signed By: Vangie Brooks MD    1/4/2020  Post anesthesia nausea and vomiting:  controlled      Vitals Value Taken Time   /52 1/4/2020  6:55 PM   Temp 36.8 °C (98.2 °F) 1/4/2020  6:50 PM   Pulse 82 1/4/2020  6:58 PM   Resp 21 1/4/2020  6:58 PM   SpO2 100 % 1/4/2020  6:58 PM   Vitals shown include unvalidated device data.

## 2020-01-05 NOTE — PROGRESS NOTES
Verenice Oden RN, from L&D came to assist with assessing FHT.   FHT assessed with doppler; averaged @ 135bpm  Mom tolerated well  Pnt stable  NADN

## 2020-01-05 NOTE — PERIOP NOTES
TRANSFER - OUT REPORT:    Verbal report given to [place], RN (name) on [facility]  being transferred to Gen/ Surg (unit) for routine post - op       Report consisted of patients Situation, Background, Assessment and   Recommendations(SBAR). Information from the following report(s) SBAR, Kardex, ED Summary, OR Summary, Procedure Summary, Intake/Output, MAR, Recent Results and Cardiac Rhythm NSR was reviewed with the receiving nurse. Lines:       Opportunity for questions and clarification was provided.       Patient transported with:   Registered Nurse

## 2020-01-08 DIAGNOSIS — G89.18 POST-OP PAIN: Primary | ICD-10-CM

## 2020-01-08 RX ORDER — OXYCODONE AND ACETAMINOPHEN 5; 325 MG/1; MG/1
1 TABLET ORAL
Qty: 15 TAB | Refills: 0 | Status: SHIPPED | OUTPATIENT
Start: 2020-01-08 | End: 2020-01-12

## 2020-01-08 NOTE — TELEPHONE ENCOUNTER
Patient called office stating only 2 Norco left and has severe RLQ pain. Request percocet.   15 tablets sent to pharmacy

## 2020-01-09 ENCOUNTER — APPOINTMENT (OUTPATIENT)
Dept: MRI IMAGING | Age: 30
End: 2020-01-09
Attending: EMERGENCY MEDICINE
Payer: MEDICAID

## 2020-01-09 ENCOUNTER — HOSPITAL ENCOUNTER (EMERGENCY)
Age: 30
Discharge: HOME | End: 2020-01-09
Attending: EMERGENCY MEDICINE
Payer: MEDICAID

## 2020-01-09 VITALS
SYSTOLIC BLOOD PRESSURE: 106 MMHG | TEMPERATURE: 97.4 F | RESPIRATION RATE: 18 BRPM | HEART RATE: 83 BPM | WEIGHT: 173.06 LBS | OXYGEN SATURATION: 99 % | HEIGHT: 66 IN | BODY MASS INDEX: 27.81 KG/M2 | DIASTOLIC BLOOD PRESSURE: 62 MMHG

## 2020-01-09 DIAGNOSIS — G89.18 POSTOPERATIVE PAIN: ICD-10-CM

## 2020-01-09 DIAGNOSIS — R10.31 ABDOMINAL PAIN, RIGHT LOWER QUADRANT: Primary | ICD-10-CM

## 2020-01-09 LAB
ALBUMIN SERPL-MCNC: 3.1 G/DL (ref 3.5–5)
ALBUMIN/GLOB SERPL: 0.7 {RATIO} (ref 1.1–2.2)
ALP SERPL-CCNC: 65 U/L (ref 45–117)
ALT SERPL-CCNC: 25 U/L (ref 12–78)
ANION GAP SERPL CALC-SCNC: 8 MMOL/L (ref 5–15)
APPEARANCE UR: CLEAR
AST SERPL-CCNC: 13 U/L (ref 15–37)
BACTERIA URNS QL MICRO: NEGATIVE /HPF
BASOPHILS # BLD: 0 K/UL (ref 0–0.1)
BASOPHILS NFR BLD: 0 % (ref 0–1)
BILIRUB SERPL-MCNC: 0.4 MG/DL (ref 0.2–1)
BILIRUB UR QL: NEGATIVE
BUN SERPL-MCNC: 6 MG/DL (ref 6–20)
BUN/CREAT SERPL: 10 (ref 12–20)
CALCIUM SERPL-MCNC: 9.4 MG/DL (ref 8.5–10.1)
CHLORIDE SERPL-SCNC: 104 MMOL/L (ref 97–108)
CO2 SERPL-SCNC: 25 MMOL/L (ref 21–32)
COLOR UR: ABNORMAL
CREAT SERPL-MCNC: 0.61 MG/DL (ref 0.55–1.02)
DIFFERENTIAL METHOD BLD: ABNORMAL
EOSINOPHIL # BLD: 0.1 K/UL (ref 0–0.4)
EOSINOPHIL NFR BLD: 1 % (ref 0–7)
EPITH CASTS URNS QL MICRO: ABNORMAL /LPF
ERYTHROCYTE [DISTWIDTH] IN BLOOD BY AUTOMATED COUNT: 13 % (ref 11.5–14.5)
GLOBULIN SER CALC-MCNC: 4.5 G/DL (ref 2–4)
GLUCOSE SERPL-MCNC: 84 MG/DL (ref 65–100)
GLUCOSE UR STRIP.AUTO-MCNC: NEGATIVE MG/DL
HCT VFR BLD AUTO: 38.9 % (ref 35–47)
HGB BLD-MCNC: 12.9 G/DL (ref 11.5–16)
HGB UR QL STRIP: NEGATIVE
IMM GRANULOCYTES # BLD AUTO: 0.1 K/UL (ref 0–0.04)
IMM GRANULOCYTES NFR BLD AUTO: 1 % (ref 0–0.5)
KETONES UR QL STRIP.AUTO: 40 MG/DL
LACTATE SERPL-SCNC: 0.8 MMOL/L (ref 0.4–2)
LEUKOCYTE ESTERASE UR QL STRIP.AUTO: NEGATIVE
LIPASE SERPL-CCNC: 63 U/L (ref 73–393)
LYMPHOCYTES # BLD: 1.1 K/UL (ref 0.8–3.5)
LYMPHOCYTES NFR BLD: 10 % (ref 12–49)
MCH RBC QN AUTO: 30.6 PG (ref 26–34)
MCHC RBC AUTO-ENTMCNC: 33.2 G/DL (ref 30–36.5)
MCV RBC AUTO: 92.2 FL (ref 80–99)
MONOCYTES # BLD: 0.7 K/UL (ref 0–1)
MONOCYTES NFR BLD: 7 % (ref 5–13)
NEUTS SEG # BLD: 9.1 K/UL (ref 1.8–8)
NEUTS SEG NFR BLD: 81 % (ref 32–75)
NITRITE UR QL STRIP.AUTO: NEGATIVE
NRBC # BLD: 0 K/UL (ref 0–0.01)
NRBC BLD-RTO: 0 PER 100 WBC
PH UR STRIP: 7.5 [PH] (ref 5–8)
PLATELET # BLD AUTO: 307 K/UL (ref 150–400)
PMV BLD AUTO: 10.8 FL (ref 8.9–12.9)
POTASSIUM SERPL-SCNC: 3.7 MMOL/L (ref 3.5–5.1)
PROT SERPL-MCNC: 7.6 G/DL (ref 6.4–8.2)
PROT UR STRIP-MCNC: NEGATIVE MG/DL
RBC # BLD AUTO: 4.22 M/UL (ref 3.8–5.2)
RBC #/AREA URNS HPF: ABNORMAL /HPF (ref 0–5)
SODIUM SERPL-SCNC: 137 MMOL/L (ref 136–145)
SP GR UR REFRACTOMETRY: 1.01 (ref 1–1.03)
UA: UC IF INDICATED,UAUC: ABNORMAL
UROBILINOGEN UR QL STRIP.AUTO: 1 EU/DL (ref 0.2–1)
WBC # BLD AUTO: 11.1 K/UL (ref 3.6–11)
WBC URNS QL MICRO: ABNORMAL /HPF (ref 0–4)

## 2020-01-09 PROCEDURE — 74181 MRI ABDOMEN W/O CONTRAST: CPT

## 2020-01-09 PROCEDURE — 36415 COLL VENOUS BLD VENIPUNCTURE: CPT

## 2020-01-09 PROCEDURE — 96361 HYDRATE IV INFUSION ADD-ON: CPT

## 2020-01-09 PROCEDURE — 83605 ASSAY OF LACTIC ACID: CPT

## 2020-01-09 PROCEDURE — 80053 COMPREHEN METABOLIC PANEL: CPT

## 2020-01-09 PROCEDURE — 81001 URINALYSIS AUTO W/SCOPE: CPT

## 2020-01-09 PROCEDURE — 74011250637 HC RX REV CODE- 250/637: Performed by: PHYSICIAN ASSISTANT

## 2020-01-09 PROCEDURE — 85025 COMPLETE CBC W/AUTO DIFF WBC: CPT

## 2020-01-09 PROCEDURE — 74011250636 HC RX REV CODE- 250/636: Performed by: EMERGENCY MEDICINE

## 2020-01-09 PROCEDURE — 83690 ASSAY OF LIPASE: CPT

## 2020-01-09 PROCEDURE — 96375 TX/PRO/DX INJ NEW DRUG ADDON: CPT

## 2020-01-09 PROCEDURE — 99284 EMERGENCY DEPT VISIT MOD MDM: CPT

## 2020-01-09 PROCEDURE — 96374 THER/PROPH/DIAG INJ IV PUSH: CPT

## 2020-01-09 RX ORDER — MORPHINE SULFATE 10 MG/ML
4 INJECTION, SOLUTION INTRAMUSCULAR; INTRAVENOUS
Status: COMPLETED | OUTPATIENT
Start: 2020-01-09 | End: 2020-01-09

## 2020-01-09 RX ORDER — DEXTROSE, SODIUM CHLORIDE, SODIUM LACTATE, POTASSIUM CHLORIDE, AND CALCIUM CHLORIDE 5; .6; .31; .03; .02 G/100ML; G/100ML; G/100ML; G/100ML; G/100ML
500 INJECTION, SOLUTION INTRAVENOUS CONTINUOUS
Status: DISCONTINUED | OUTPATIENT
Start: 2020-01-09 | End: 2020-01-10 | Stop reason: HOSPADM

## 2020-01-09 RX ORDER — ONDANSETRON 2 MG/ML
4 INJECTION INTRAMUSCULAR; INTRAVENOUS
Status: COMPLETED | OUTPATIENT
Start: 2020-01-09 | End: 2020-01-09

## 2020-01-09 RX ORDER — MORPHINE SULFATE 2 MG/ML
2 INJECTION, SOLUTION INTRAMUSCULAR; INTRAVENOUS
Status: DISCONTINUED | OUTPATIENT
Start: 2020-01-09 | End: 2020-01-10 | Stop reason: HOSPADM

## 2020-01-09 RX ORDER — OXYCODONE AND ACETAMINOPHEN 5; 325 MG/1; MG/1
TABLET ORAL
Status: DISCONTINUED
Start: 2020-01-09 | End: 2020-01-10 | Stop reason: HOSPADM

## 2020-01-09 RX ORDER — OXYCODONE AND ACETAMINOPHEN 5; 325 MG/1; MG/1
2 TABLET ORAL
Status: COMPLETED | OUTPATIENT
Start: 2020-01-09 | End: 2020-01-09

## 2020-01-09 RX ADMIN — SODIUM CHLORIDE, SODIUM LACTATE, POTASSIUM CHLORIDE, CALCIUM CHLORIDE, AND DEXTROSE MONOHYDRATE 500 ML/HR: 600; 310; 30; 20; 5 INJECTION, SOLUTION INTRAVENOUS at 16:24

## 2020-01-09 RX ADMIN — OXYCODONE HYDROCHLORIDE AND ACETAMINOPHEN 2 TABLET: 5; 325 TABLET ORAL at 14:25

## 2020-01-09 RX ADMIN — ONDANSETRON 4 MG: 2 INJECTION INTRAMUSCULAR; INTRAVENOUS at 16:24

## 2020-01-09 RX ADMIN — MORPHINE SULFATE 4 MG: 10 INJECTION INTRAVENOUS at 16:24

## 2020-01-09 NOTE — ROUTINE PROCESS

## 2020-01-09 NOTE — ED NOTES
Pt arrived from triage, with increased pain post-op following an appendectomy on 1/4/2020. Pt also had a near syncopal episode in the lobby. Pt placed on monitor x3, call bell in reach, a/ox4, family at bedside.

## 2020-01-10 NOTE — DISCHARGE INSTRUCTIONS
Patient Education        Acute Pain After Surgery: Care Instructions  Your Care Instructions    It's common to have some pain after surgery. Pain doesn't mean that something is wrong or that the surgery didn't go well. But when the pain is severe, it's important to work with your doctor to manage it. It's also important to be aware of a few facts about pain and pain medicine. · You are the only person who knows what your pain feels like. So be sure to tell your doctor when you are in pain or when the pain changes. Then he or she will know how to adjust your medicines. · Pain is often easier to control right after it starts. So it may be better to take regular doses of pain medicine and not wait until the pain gets bad. · Medicine can help control pain. But this doesn't mean you'll have no pain. Medicine works to keep the pain at a level you can live with. With time, you will feel better. Follow-up care is a key part of your treatment and safety. Be sure to make and go to all appointments, and call your doctor if you are having problems. It's also a good idea to know your test results and keep a list of the medicines you take. How can you care for yourself at home? · Be safe with medicines. Read and follow all instructions on the label. ? If the doctor gave you a prescription medicine for pain, take it as prescribed. ? If you are not taking a prescription pain medicine, ask your doctor if you can take an over-the-counter medicine. · If you take an over-the-counter pain medicine, such as acetaminophen (Tylenol), ibuprofen (Advil, Motrin), or naproxen (Aleve), read and follow all instructions on the label. · Do not take two or more pain medicines at the same time unless the doctor told you to. · Do not drink alcohol while you are taking pain medicines. · Try to walk each day if your doctor recommends it. Start by walking a little more than you did the day before. Bit by bit, increase the amount you walk. Walking increases blood flow. It also helps prevent pneumonia and constipation. · To prevent constipation from opioid pain medicines:  ? Talk to your doctor about a laxative. ? Include fruits, vegetables, beans, and whole grains in your diet each day. These foods are high in fiber. ? Drink plenty of fluids, enough so that your urine is light yellow or clear like water. Drink water, fruit juice, or other drinks that do not contain caffeine or alcohol. If you have kidney, heart, or liver disease and have to limit fluids, talk with your doctor before you increase the amount of fluids you drink. ? Take a fiber supplement, such as Citrucel or Metamucil, every day if needed. Read and follow all instructions on the label. If you take pain medicine for more than a few days, talk to your doctor before you take fiber. When should you call for help? Call your doctor now or seek immediate medical care if:    · Your pain gets worse.     · Your pain is not controlled by medicine.    Watch closely for changes in your health, and be sure to contact your doctor if you have any problems. Where can you learn more? Go to http://vilma-francois.info/. Enter (61) 511-085 in the search box to learn more about \"Acute Pain After Surgery: Care Instructions. \"  Current as of: June 26, 2019  Content Version: 12.2  © 5335-7327 [employer]. Care instructions adapted under license by [employer] (which disclaims liability or warranty for this information). If you have questions about a medical condition or this instruction, always ask your healthcare professional. [place] [address] any warranty or liability for your use of this information. Patient Education        Belly Pain in Pregnancy: Care Instructions  Your Care Instructions    When you're pregnant, any belly pain can be a worry. You may not want to call your doctor about every pain you have.  But you don't want to miss something that is dangerous for you or your baby. Even if it feels familiar, belly pain can mean something new when you're pregnant. It's important to know when to call your doctor. It will also help to know how to care for yourself at home when your pain is not caused by anything harmful. · When belly pain is more severe or constant, see a doctor right away. · If you're sure your belly pain is a sign of labor, call your doctor. · When belly pain is brief, it's usually a normal part of pregnancy. It might be related to changes in the growing uterus. Or it could be the stretching of ligaments called round ligaments. These ligaments help support the uterus. Round ligament pain can be on either side of your belly. It can also be felt in your hips or groin. Follow-up care is a key part of your treatment and safety. Be sure to make and go to all appointments, and call your doctor if you are having problems. It's also a good idea to know your test results and keep a list of the medicines you take. How can you tell if belly pain is a sign of labor? When belly pain is caused by labor, it can feel like mild or menstrual-like cramps in your lower belly. These cramps are probably contractions. They can happen in your second or third trimester. You may also have:  · A steady, dull ache in your lower back, pelvis, or thighs. · A feeling of pressure in your pelvis or lower belly. · Changes in your vaginal discharge or a sudden release of fluid from the vagina. If you think you are in labor, call your doctor. How can you care for yourself at home? When belly pain is mild and is not a symptom of labor:  · Rest until you feel better. · Take a warm bath. · Think about what you drink and eat:  ? Drink plenty of fluids. Choose water and other caffeine-free clear liquids until you feel better. ? Try eating small, frequent meals.  If your stomach is upset, try bland, low-fat foods like plain rice, broiled chicken, toast, and yogurt. · Think about how you move if you are having brief pains from stretching of the round ligaments. ? Try gentle stretching. ? Move a little more slowly when turning in bed or getting up from a chair, so those ligaments don't stretch quickly. ? Lean forward a bit if you think you are going to cough or sneeze. When should you call for help? Call 911 anytime you think you may need emergency care. For example, call if:    · You have sudden, severe pain in your belly.     · You have severe vaginal bleeding.     · You passed out (lost consciousness).     · You have a seizure.    Call your doctor now or seek immediate medical care if:    · You have new or worse belly pain or cramping.     · You have any vaginal bleeding.     · You have a fever.     · You have symptoms of preeclampsia, such as:  ? Sudden swelling of your face, hands, or feet. ? New vision problems (such as dimness, blurring, or seeing spots). ? A severe headache.     · You think that you may be in labor. This means that you've had at least 8 contractions within 1 hour or at least 4 contractions within 20 minutes, even after you change your position and drink fluids.     · You have symptoms of a urinary tract infection. These may include:  ? Pain or burning when you urinate. ? A frequent need to urinate without being able to pass much urine. ? Pain in the flank, which is just below the rib cage and above the waist on either side of the back. ? Blood in your urine.    Watch closely for changes in your health, and be sure to contact your doctor if you are worried about your or your baby's health. Where can you learn more? Go to http://vilma-francois.info/. Enter [phone] in the search box to learn more about \"Belly Pain in Pregnancy: Care Instructions. \"  Current as of: May 29, 2019  Content Version: 12.2  © 3554-5767 [employer], [employer]@ [employer].  Care instructions adapted under license by [employer] (which disclaims liability or warranty for this information). If you have questions about a medical condition or this instruction, always ask your healthcare professional. [place] [address] any warranty or liability for your use of this information.

## 2020-01-10 NOTE — ED NOTES
Bedside and Verbal shift change report given to Anoop Johns RN (oncoming nurse) by Seda Messer RN (offgoing nurse).  Report included the following information SBAR, Kardex, ED Summary and Recent Results.   ]

## 2020-01-10 NOTE — ED NOTES
Merna Armas MD reviewed discharge instructions with the patient. The patient verbalized understanding. All questions and concerns were addressed. The patient declined a wheelchair and is discharged ambulatory in the care of family members with instructions and prescriptions in hand. Pt is alert and oriented x 4. Respirations are clear and unlabored.

## 2020-01-14 NOTE — PROGRESS NOTES
Pt sp lap appendectomy Jan 4, 2020 and known IUP pregnancy 20 weeks, and pathology with appendicitis and benign right fallopian tube cyst.    SUBJECTIVE: Tanner Jean is a 30 y.o. female is seen for a routine postop check. Reports no problems with the wound or other issues. Activity, diet and bowels are normal. No pain. OBJECTIVE: Appears well. Neuro C A and O x3  CV-  Reg rate  Lungs:  CTA  Abd  soft  NT, 20 weeks pregnant, no complications  Wounds are well healed without complications or infection. ASSESSMENT: normal postdischarge course, doing well. PLAN: Patient is instructed to ad fabiola activity . Return PRN for any problems or concerns.   Okay for patient to return to work, she requests this as well  She works at [employer] as a

## 2020-01-17 ENCOUNTER — OFFICE VISIT (OUTPATIENT)
Dept: SURGERY | Age: 30
End: 2020-01-17

## 2020-01-17 VITALS
SYSTOLIC BLOOD PRESSURE: 113 MMHG | WEIGHT: 176.2 LBS | BODY MASS INDEX: 28.32 KG/M2 | RESPIRATION RATE: 18 BRPM | OXYGEN SATURATION: 99 % | HEART RATE: 97 BPM | DIASTOLIC BLOOD PRESSURE: 77 MMHG | TEMPERATURE: 98.2 F | HEIGHT: 66 IN

## 2020-01-17 DIAGNOSIS — K35.80 ACUTE APPENDICITIS COMPLICATING PREGNANCY: Primary | ICD-10-CM

## 2020-01-17 DIAGNOSIS — O99.619 ACUTE APPENDICITIS COMPLICATING PREGNANCY: Primary | ICD-10-CM

## 2020-01-17 NOTE — PROGRESS NOTES
Vesna Pierson is a 30 y.o. female  Chief Complaint   Patient presents with    Surgical Follow-up     Laparoscopic appendectomy, enterolysis & excision right fallopian tube cyst 1/4/20 by Dr Matilde Guerin     Visit Vitals  /77 (BP 1 Location: Right arm, BP Patient Position: Sitting)   Pulse 97   Temp 98.2 °F (36.8 °C) (Oral)   Resp 18   Ht 5' 6\" (1.676 m)   Wt 176 lb 3.2 oz (79.9 kg)   LMP 08/23/2019   SpO2 99%   BMI 28.44 kg/m²

## 2020-01-17 NOTE — LETTER
NOTIFICATION OF RETURN TO WORK 
 
1/17/2020 2:30 PM 
 
Ms. Aniyah Rice [address] [facility] Reasoner To Whom It May Concern: 
 
Aniyah Rice is under the care of [address] from January 4, 2020 to present. Due to a medical procedure, she will be out of work from January 4, 2020 through January 17, 2020. She will be able to return to work on Saturday, January 18, 2020 with no restrictions. If there are questions or concerns please have the patient contact our office.  
 
Sincerely, 
 
 
Susie Oliva MD

## 2020-01-22 NOTE — ED PROVIDER NOTES
EMERGENCY DEPARTMENT HISTORY AND PHYSICAL EXAM      Date: 1/9/2020  Patient Name: Gabbi Badillo  Patient Age and Sex: 30 y.o. female    History of Presenting Illness     Chief Complaint   Patient presents with    Post OP Complication     Patient reports appendectomy 1/4 and c/o continued RLQ pain and vomiting; patient is also 21 weeks pregnant and denies vaginal bleeding/fluid leakage       History Provided By: Patient    HPI: Gabbi Badillo, is a 30 y.o. female in her second trimester who had an appendectomy on 1/4 presents with persistent rlq abd pain. No other symptoms. She expected that her discomfort would have improved by now after her appendectomy and is here to make sure there are no postop complications. No fevers or chills. Normal appetite. No issues with the baby for which she has seen OB after her operation. Her pain has improved but not resolved since her surgery. Normal BM, no urinary symptoms, no vaginal bleeding or discharge. Pt denies any other alleviating or exacerbating factors. There are no other complaints, changes or physical findings at this time.      Past Medical History:   Diagnosis Date    Headache     Hypertension     Lumbar degenerative disc disease 2018    Other ill-defined conditions(799.89)     kidney infections    Other ill-defined conditions(799.89)     Migraines    Ovarian cyst     Ruptured ovarian cyst 2018     Past Surgical History:   Procedure Laterality Date    HX APPENDECTOMY  01/04/2020    Laparoscopic appendectomy, enterolysis & excision right fallopian tube cyst by Dr Tiburcio Almeida      pelvic bone fx       PCP: Julio Villalobos NP    Past History   Past Medical History:  Past Medical History:   Diagnosis Date    Headache     Hypertension     Lumbar degenerative disc disease 2018    Other ill-defined conditions(799.89)     kidney infections    Other ill-defined conditions(799.89)     Migraines    Ovarian cyst     Ruptured ovarian cyst 2018       Past Surgical History:  Past Surgical History:   Procedure Laterality Date    HX APPENDECTOMY  01/04/2020    Laparoscopic appendectomy, enterolysis & excision right fallopian tube cyst by Dr Nerissa Cary    HX ORTHOPAEDIC      pelvic bone fx       Family History:  Family History   Problem Relation Age of Onset    No Known Problems Mother     No Known Problems Father     No Known Problems Brother        Social History:  Social History     Tobacco Use    Smoking status: Former Smoker    Smokeless tobacco: Never Used    Tobacco comment: Very rarely   Substance Use Topics    Alcohol use: No    Drug use: No       Allergies: Allergies   Allergen Reactions    Ciprofloxacin Hives    Pcn [Penicillins] Hives    Percocet [Oxycodone-Acetaminophen] Other (comments)     Becomes sweaty and passes out       Current Medications:  No current facility-administered medications on file prior to encounter. Current Outpatient Medications on File Prior to Encounter   Medication Sig Dispense Refill    PNV No12-Iron-FA-DSS-OM-3 29 mg iron-1 mg -50 mg CPKD Take 1 Cap by mouth daily.  acetaminophen (TYLENOL) 325 mg tablet Take 325 mg by mouth every four (4) hours as needed for Pain.  butalbital-acetaminophen-caffeine (FIORICET, ESGIC) -40 mg per tablet TAKE 1 TABLET BY MOUTH EVERY 4 HOURS AS NEEDED FOR PAIN 20 Tab 0    topiramate (TOPAMAX) 50 mg tablet Take 50 mg by mouth nightly.  ondansetron (ZOFRAN ODT) 8 mg disintegrating tablet Take 1 Tab by mouth every eight (8) hours as needed for Nausea. 30 Tab 1    rizatriptan (MAXALT) 10 mg tablet TAKE 1 TABLET BY MOUTH ONCE AS NEEDED FOR MIGRAINE FOR UP TO 1 DOSE. MAY REPEAT IN 2 HOURS IF NEEDED. 9 Tab 3       Review of Systems   Review of Systems   Constitutional: Negative. Negative for appetite change, chills and fever. HENT: Negative for congestion, ear pain, rhinorrhea, sinus pain, trouble swallowing and voice change.     Respiratory: Negative for cough, chest tightness, shortness of breath, wheezing and stridor. Cardiovascular: Negative for chest pain, palpitations and leg swelling. Gastrointestinal: Positive for abdominal pain. Negative for blood in stool, constipation, diarrhea, nausea and vomiting. Genitourinary: Negative for difficulty urinating, dysuria, flank pain, frequency and hematuria. Musculoskeletal: Negative for arthralgias and joint swelling. Skin: Negative. Neurological: Negative for dizziness, syncope, weakness, numbness and headaches. All other systems reviewed and are negative. Physical Exam   Physical Exam  Vitals signs and nursing note reviewed. Constitutional:       Appearance: She is well-developed. HENT:      Mouth/Throat:      Mouth: Mucous membranes are moist.   Eyes:      General: No scleral icterus. Extraocular Movements: Extraocular movements intact. Conjunctiva/sclera: Conjunctivae normal.      Pupils: Pupils are equal, round, and reactive to light. Neck:      Musculoskeletal: Normal range of motion and neck supple. Vascular: No JVD. Cardiovascular:      Rate and Rhythm: Normal rate and regular rhythm. Pulses: Normal pulses. Heart sounds: Normal heart sounds. Pulmonary:      Effort: Pulmonary effort is normal.      Breath sounds: Normal breath sounds. Chest:      Chest wall: No tenderness. Abdominal:      General: Bowel sounds are normal.      Palpations: Abdomen is soft. Tenderness: There is no right CVA tenderness, left CVA tenderness, guarding or rebound. Comments: Appropriately gravid, tender in rlq, not peritoneal on exam. Surgical incision sites well-healed, no evidence of infection. Musculoskeletal: Normal range of motion. General: No swelling or tenderness. Right lower leg: No edema. Left lower leg: No edema. Skin:     General: Skin is warm and dry. Capillary Refill: Capillary refill takes less than 2 seconds. Findings: No erythema or rash. Neurological:      General: No focal deficit present. Mental Status: She is alert and oriented to person, place, and time. Mental status is at baseline. Cranial Nerves: No cranial nerve deficit. Psychiatric:         Mood and Affect: Mood normal.         Behavior: Behavior normal.         Diagnostic Study Results     Labs -  No results found for this or any previous visit (from the past 24 hour(s)). Radiologic Studies -   MRI ABD WO CONT   Final Result   IMPRESSION: No acute process. Medical Decision Making   I am the first provider for this patient. Records Reviewed: I reviewed our electronic medical record system for any past medical records that were available that may contribute to the patient's current condition, including their PMH, surgical history, social and family history. Reviewed the nursing notes and vital signs from today's visit. Vital Signs-Reviewed the patient's vital signs. Provider Notes (Medical Decision Making):   Well-appearing female in second trimester of a pregnancy c/b appendicitis and s/p appendectomy on 1/4 is here for evaluation of persistent rlq abd pain since her surgery. Exam is benign and reassuring - not peritoneal, well-healed surgical incision sites. Will get repeat MRI of her abdomen to ensure no abscess or other post-op complications and to exclude any other emergent reasons for her abd pain. ED Course:   Initial assessment performed. The patients presenting problems have been discussed, and they are in agreement with the care plan formulated and outlined with them. I have encouraged them to ask questions as they arise throughout their visit.       Medications Administered During ED Course:  Medications   oxyCODONE-acetaminophen (PERCOCET) 5-325 mg per tablet 2 Tab (2 Tabs Oral Given 1/9/20 4811)   morphine 10 mg/ml injection 4 mg (4 mg IntraVENous Given 1/9/20 1624)   ondansetron (ZOFRAN) injection 4 mg (4 mg IntraVENous Given 1/9/20 1311)     Progress note:  Patient has been reassessed and reports feeling considerably better, has normal vital signs and feels comfortable going home. I think this is reasonable as no findings today suggest a life-threatening condition. DISPOSITION: DISCHARGE  The patient's results have been reviewed with patient and available family and/or caregiver. They verbally convey their understanding and agreement of the patient's signs, symptoms, diagnosis, treatment and prognosis and additionally agree to follow up as recommended in the discharge instructions or to return to the Emergency Department should the patient's condition change prior to their follow-up appointment. The patient and available family and/or caregiver verbally agree with the care plan and all of their questions have been answered. The discharge instructions have also been provided to the them with educational information regarding the patient's diagnosis as well a list of reasons why the patient would want to return to the ER prior to their follow-up appointment should any concerns arise, the patient's condition change or symptoms worsen. Vaishnavi De MD, MSc      Diagnosis     Clinical Impression:   1. Abdominal pain, right lower quadrant    2. Postoperative pain        Attestation:  I personally performed the services described in this documentation on this date 1/9/2020 for patient Fernando Escobar. Vaishnavi De MD    Please note that this dictation was completed with [employer], the computer voice recognition software. Quite often unanticipated grammatical, syntax, homophones, and other interpretive errors are inadvertently transcribed by the computer software. Please disregard these errors. Please excuse any errors that have escaped final proofreading.

## 2020-04-22 LAB — GRBS, EXTERNAL: POSITIVE

## 2020-05-19 ENCOUNTER — ANESTHESIA EVENT (OUTPATIENT)
Dept: LABOR AND DELIVERY | Age: 30
DRG: 560 | End: 2020-05-19
Payer: MEDICAID

## 2020-05-19 ENCOUNTER — ANESTHESIA (OUTPATIENT)
Dept: LABOR AND DELIVERY | Age: 30
DRG: 560 | End: 2020-05-19
Payer: MEDICAID

## 2020-05-19 ENCOUNTER — HOSPITAL ENCOUNTER (INPATIENT)
Age: 30
LOS: 2 days | Discharge: HOME | DRG: 560 | End: 2020-05-21
Attending: OBSTETRICS & GYNECOLOGY | Admitting: OBSTETRICS & GYNECOLOGY
Payer: MEDICAID

## 2020-05-19 DIAGNOSIS — Z3A.39 39 WEEKS GESTATION OF PREGNANCY: Primary | ICD-10-CM

## 2020-05-19 PROBLEM — Z34.90 PREGNANCY: Status: ACTIVE | Noted: 2020-05-19

## 2020-05-19 LAB
A1 MICROGLOB PLACENTAL VAG QL: POSITIVE
BASOPHILS # BLD: 0 K/UL (ref 0–0.1)
BASOPHILS NFR BLD: 0 % (ref 0–1)
CONTROL LINE PRESENT?: NORMAL
DIFFERENTIAL METHOD BLD: ABNORMAL
EOSINOPHIL # BLD: 0.1 K/UL (ref 0–0.4)
EOSINOPHIL NFR BLD: 1 % (ref 0–7)
ERYTHROCYTE [DISTWIDTH] IN BLOOD BY AUTOMATED COUNT: 13.7 % (ref 11.5–14.5)
EXPIRATION DATE: NORMAL
HCT VFR BLD AUTO: 38.7 % (ref 35–47)
HGB BLD-MCNC: 12.5 G/DL (ref 11.5–16)
IMM GRANULOCYTES # BLD AUTO: 0.1 K/UL (ref 0–0.04)
IMM GRANULOCYTES NFR BLD AUTO: 1 % (ref 0–0.5)
INTERNAL NEGATIVE CONTROL: NORMAL
KIT LOT NO.: NORMAL
LYMPHOCYTES # BLD: 1.9 K/UL (ref 0.8–3.5)
LYMPHOCYTES NFR BLD: 15 % (ref 12–49)
MCH RBC QN AUTO: 29.8 PG (ref 26–34)
MCHC RBC AUTO-ENTMCNC: 32.3 G/DL (ref 30–36.5)
MCV RBC AUTO: 92.1 FL (ref 80–99)
MONOCYTES # BLD: 0.8 K/UL (ref 0–1)
MONOCYTES NFR BLD: 6 % (ref 5–13)
NEUTS SEG # BLD: 9.9 K/UL (ref 1.8–8)
NEUTS SEG NFR BLD: 77 % (ref 32–75)
NRBC # BLD: 0 K/UL (ref 0–0.01)
NRBC BLD-RTO: 0 PER 100 WBC
PLATELET # BLD AUTO: 264 K/UL (ref 150–400)
PMV BLD AUTO: 11 FL (ref 8.9–12.9)
RBC # BLD AUTO: 4.2 M/UL (ref 3.8–5.2)
WBC # BLD AUTO: 12.8 K/UL (ref 3.6–11)

## 2020-05-19 PROCEDURE — 74011250636 HC RX REV CODE- 250/636: Performed by: OBSTETRICS & GYNECOLOGY

## 2020-05-19 PROCEDURE — 85025 COMPLETE CBC W/AUTO DIFF WBC: CPT

## 2020-05-19 PROCEDURE — 65410000002 HC RM PRIVATE OB

## 2020-05-19 PROCEDURE — 74011000250 HC RX REV CODE- 250

## 2020-05-19 PROCEDURE — 99285 EMERGENCY DEPT VISIT HI MDM: CPT

## 2020-05-19 PROCEDURE — 36415 COLL VENOUS BLD VENIPUNCTURE: CPT

## 2020-05-19 PROCEDURE — A4300 CATH IMPL VASC ACCESS PORTAL: HCPCS

## 2020-05-19 PROCEDURE — 74011000250 HC RX REV CODE- 250: Performed by: ANESTHESIOLOGY

## 2020-05-19 PROCEDURE — 75410000002 HC LABOR FEE PER 1 HR

## 2020-05-19 PROCEDURE — 77030005513 HC CATH URETH FOL11 MDII -B

## 2020-05-19 PROCEDURE — 77030014125 HC TY EPDRL BBMI -B: Performed by: ANESTHESIOLOGY

## 2020-05-19 PROCEDURE — 59025 FETAL NON-STRESS TEST: CPT

## 2020-05-19 PROCEDURE — 84112 EVAL AMNIOTIC FLUID PROTEIN: CPT | Performed by: OBSTETRICS & GYNECOLOGY

## 2020-05-19 RX ORDER — NALOXONE HYDROCHLORIDE 0.4 MG/ML
0.4 INJECTION, SOLUTION INTRAMUSCULAR; INTRAVENOUS; SUBCUTANEOUS AS NEEDED
Status: DISCONTINUED | OUTPATIENT
Start: 2020-05-19 | End: 2020-05-20 | Stop reason: HOSPADM

## 2020-05-19 RX ORDER — CLINDAMYCIN PHOSPHATE 900 MG/50ML
900 INJECTION INTRAVENOUS EVERY 8 HOURS
Status: DISCONTINUED | OUTPATIENT
Start: 2020-05-19 | End: 2020-05-20 | Stop reason: HOSPADM

## 2020-05-19 RX ORDER — EPHEDRINE SULFATE/0.9% NACL/PF 50 MG/5 ML
10 SYRINGE (ML) INTRAVENOUS
Status: DISCONTINUED | OUTPATIENT
Start: 2020-05-19 | End: 2020-05-20 | Stop reason: HOSPADM

## 2020-05-19 RX ORDER — BUTORPHANOL TARTRATE 2 MG/ML
1 INJECTION INTRAMUSCULAR; INTRAVENOUS
Status: DISCONTINUED | OUTPATIENT
Start: 2020-05-19 | End: 2020-05-20 | Stop reason: HOSPADM

## 2020-05-19 RX ORDER — ONDANSETRON 2 MG/ML
4 INJECTION INTRAMUSCULAR; INTRAVENOUS
Status: DISCONTINUED | OUTPATIENT
Start: 2020-05-19 | End: 2020-05-20 | Stop reason: HOSPADM

## 2020-05-19 RX ORDER — BUPIVACAINE HYDROCHLORIDE AND EPINEPHRINE 2.5; 5 MG/ML; UG/ML
INJECTION, SOLUTION EPIDURAL; INFILTRATION; INTRACAUDAL; PERINEURAL
Status: COMPLETED
Start: 2020-05-19 | End: 2020-05-19

## 2020-05-19 RX ORDER — OXYTOCIN/0.9 % SODIUM CHLORIDE 30/500 ML
0-25 PLASTIC BAG, INJECTION (ML) INTRAVENOUS
Status: DISCONTINUED | OUTPATIENT
Start: 2020-05-19 | End: 2020-05-21 | Stop reason: HOSPADM

## 2020-05-19 RX ORDER — SODIUM CHLORIDE, SODIUM LACTATE, POTASSIUM CHLORIDE, CALCIUM CHLORIDE 600; 310; 30; 20 MG/100ML; MG/100ML; MG/100ML; MG/100ML
125 INJECTION, SOLUTION INTRAVENOUS CONTINUOUS
Status: DISCONTINUED | OUTPATIENT
Start: 2020-05-19 | End: 2020-05-21 | Stop reason: HOSPADM

## 2020-05-19 RX ORDER — SODIUM CHLORIDE 0.9 % (FLUSH) 0.9 %
5-40 SYRINGE (ML) INJECTION AS NEEDED
Status: DISCONTINUED | OUTPATIENT
Start: 2020-05-19 | End: 2020-05-20 | Stop reason: HOSPADM

## 2020-05-19 RX ORDER — SODIUM CHLORIDE 0.9 % (FLUSH) 0.9 %
5-40 SYRINGE (ML) INJECTION EVERY 8 HOURS
Status: DISCONTINUED | OUTPATIENT
Start: 2020-05-19 | End: 2020-05-21 | Stop reason: HOSPADM

## 2020-05-19 RX ORDER — FENTANYL/BUPIVACAINE/NS/PF 2-1250MCG
1-16 PREFILLED PUMP RESERVOIR EPIDURAL CONTINUOUS
Status: DISCONTINUED | OUTPATIENT
Start: 2020-05-19 | End: 2020-05-20 | Stop reason: HOSPADM

## 2020-05-19 RX ORDER — SODIUM CHLORIDE 0.9 % (FLUSH) 0.9 %
5-40 SYRINGE (ML) INJECTION AS NEEDED
Status: DISCONTINUED | OUTPATIENT
Start: 2020-05-19 | End: 2020-05-21 | Stop reason: HOSPADM

## 2020-05-19 RX ORDER — FENTANYL/BUPIVACAINE/NS/PF 2-1250MCG
PREFILLED PUMP RESERVOIR EPIDURAL
Status: COMPLETED
Start: 2020-05-19 | End: 2020-05-19

## 2020-05-19 RX ORDER — BUPIVACAINE HYDROCHLORIDE AND EPINEPHRINE 2.5; 5 MG/ML; UG/ML
INJECTION, SOLUTION EPIDURAL; INFILTRATION; INTRACAUDAL; PERINEURAL AS NEEDED
Status: DISCONTINUED | OUTPATIENT
Start: 2020-05-19 | End: 2020-05-20 | Stop reason: HOSPADM

## 2020-05-19 RX ORDER — SODIUM CHLORIDE 0.9 % (FLUSH) 0.9 %
5-40 SYRINGE (ML) INJECTION EVERY 8 HOURS
Status: DISCONTINUED | OUTPATIENT
Start: 2020-05-19 | End: 2020-05-20 | Stop reason: HOSPADM

## 2020-05-19 RX ADMIN — Medication 12 ML/HR: at 23:24

## 2020-05-19 RX ADMIN — BUPIVACAINE HYDROCHLORIDE AND EPINEPHRINE 3 ML: 2.5; 5 INJECTION, SOLUTION EPIDURAL; INFILTRATION; INTRACAUDAL; PERINEURAL at 22:58

## 2020-05-19 RX ADMIN — Medication 2 MILLI-UNITS/MIN: at 22:04

## 2020-05-19 RX ADMIN — BUPIVACAINE HYDROCHLORIDE AND EPINEPHRINE 5 ML: 2.5; 5 INJECTION, SOLUTION EPIDURAL; INFILTRATION; INTRACAUDAL; PERINEURAL at 22:59

## 2020-05-19 RX ADMIN — SODIUM CHLORIDE, SODIUM LACTATE, POTASSIUM CHLORIDE, AND CALCIUM CHLORIDE 125 ML/HR: 600; 310; 30; 20 INJECTION, SOLUTION INTRAVENOUS at 21:17

## 2020-05-19 RX ADMIN — SODIUM CHLORIDE, SODIUM LACTATE, POTASSIUM CHLORIDE, AND CALCIUM CHLORIDE 999 ML/HR: 600; 310; 30; 20 INJECTION, SOLUTION INTRAVENOUS at 22:48

## 2020-05-19 RX ADMIN — CLINDAMYCIN PHOSPHATE 900 MG: 900 INJECTION, SOLUTION INTRAVENOUS at 21:22

## 2020-05-19 RX ADMIN — BUPIVACAINE HYDROCHLORIDE AND EPINEPHRINE 5 ML: 2.5; 5 INJECTION, SOLUTION EPIDURAL; INFILTRATION; INTRACAUDAL; PERINEURAL at 23:01

## 2020-05-20 PROCEDURE — 74011250637 HC RX REV CODE- 250/637: Performed by: OBSTETRICS & GYNECOLOGY

## 2020-05-20 PROCEDURE — 00HU33Z INSERTION OF INFUSION DEVICE INTO SPINAL CANAL, PERCUTANEOUS APPROACH: ICD-10-PCS | Performed by: ANESTHESIOLOGY

## 2020-05-20 PROCEDURE — 75410000000 HC DELIVERY VAGINAL/SINGLE

## 2020-05-20 PROCEDURE — 77030031139 HC SUT VCRL2 J&J -A

## 2020-05-20 PROCEDURE — 76060000078 HC EPIDURAL ANESTHESIA

## 2020-05-20 PROCEDURE — 75410000002 HC LABOR FEE PER 1 HR

## 2020-05-20 PROCEDURE — 65410000002 HC RM PRIVATE OB

## 2020-05-20 PROCEDURE — 0UQGXZZ REPAIR VAGINA, EXTERNAL APPROACH: ICD-10-PCS | Performed by: OBSTETRICS & GYNECOLOGY

## 2020-05-20 PROCEDURE — 75410000003 HC RECOV DEL/VAG/CSECN EA 0.5 HR

## 2020-05-20 RX ORDER — OXYTOCIN/RINGER'S LACTATE 20/1000 ML
125-500 PLASTIC BAG, INJECTION (ML) INTRAVENOUS ONCE
Status: ACTIVE | OUTPATIENT
Start: 2020-05-20 | End: 2020-05-20

## 2020-05-20 RX ORDER — HYDROCODONE BITARTRATE AND ACETAMINOPHEN 5; 325 MG/1; MG/1
1 TABLET ORAL
Status: DISCONTINUED | OUTPATIENT
Start: 2020-05-20 | End: 2020-05-21 | Stop reason: HOSPADM

## 2020-05-20 RX ORDER — ONDANSETRON 2 MG/ML
4 INJECTION INTRAMUSCULAR; INTRAVENOUS
Status: DISCONTINUED | OUTPATIENT
Start: 2020-05-20 | End: 2020-05-21 | Stop reason: HOSPADM

## 2020-05-20 RX ORDER — HYDROCORTISONE ACETATE PRAMOXINE HCL 2.5; 1 G/100G; G/100G
CREAM TOPICAL AS NEEDED
Status: DISCONTINUED | OUTPATIENT
Start: 2020-05-20 | End: 2020-05-21 | Stop reason: HOSPADM

## 2020-05-20 RX ORDER — DIPHENHYDRAMINE HCL 25 MG
25 CAPSULE ORAL
Status: DISCONTINUED | OUTPATIENT
Start: 2020-05-20 | End: 2020-05-21 | Stop reason: HOSPADM

## 2020-05-20 RX ORDER — ZOLPIDEM TARTRATE 5 MG/1
5 TABLET ORAL
Status: DISCONTINUED | OUTPATIENT
Start: 2020-05-20 | End: 2020-05-21 | Stop reason: HOSPADM

## 2020-05-20 RX ORDER — ACETAMINOPHEN 325 MG/1
650 TABLET ORAL
Status: DISCONTINUED | OUTPATIENT
Start: 2020-05-20 | End: 2020-05-21 | Stop reason: HOSPADM

## 2020-05-20 RX ORDER — NALOXONE HYDROCHLORIDE 0.4 MG/ML
0.4 INJECTION, SOLUTION INTRAMUSCULAR; INTRAVENOUS; SUBCUTANEOUS AS NEEDED
Status: DISCONTINUED | OUTPATIENT
Start: 2020-05-20 | End: 2020-05-21 | Stop reason: HOSPADM

## 2020-05-20 RX ORDER — IBUPROFEN 400 MG/1
800 TABLET ORAL
Status: DISCONTINUED | OUTPATIENT
Start: 2020-05-20 | End: 2020-05-21 | Stop reason: HOSPADM

## 2020-05-20 RX ORDER — DOCUSATE SODIUM 100 MG/1
100 CAPSULE, LIQUID FILLED ORAL
Status: DISCONTINUED | OUTPATIENT
Start: 2020-05-20 | End: 2020-05-21 | Stop reason: HOSPADM

## 2020-05-20 RX ADMIN — IBUPROFEN 800 MG: 400 TABLET, FILM COATED ORAL at 09:05

## 2020-05-20 RX ADMIN — HYDROCODONE BITARTRATE AND ACETAMINOPHEN 1 TABLET: 5; 325 TABLET ORAL at 20:56

## 2020-05-20 RX ADMIN — IBUPROFEN 800 MG: 400 TABLET, FILM COATED ORAL at 17:10

## 2020-05-20 RX ADMIN — ACETAMINOPHEN 650 MG: 325 TABLET, FILM COATED ORAL at 12:43

## 2020-05-20 NOTE — ANESTHESIA PREPROCEDURE EVALUATION
Relevant Problems   No relevant active problems       Anesthetic History   No history of anesthetic complications            Review of Systems / Medical History  Patient summary reviewed, nursing notes reviewed and pertinent labs reviewed    Pulmonary  Within defined limits                 Neuro/Psych         Headaches     Cardiovascular    Hypertension              Exercise tolerance: >4 METS     GI/Hepatic/Renal  Within defined limits              Endo/Other        Arthritis     Other Findings   Comments: Lumbar degenerative disc disease            Physical Exam    Airway  Mallampati: II  TM Distance: 4 - 6 cm  Neck ROM: normal range of motion   Mouth opening: Normal     Cardiovascular  Regular rate and rhythm,  S1 and S2 normal,  no murmur, click, rub, or gallop             Dental  No notable dental hx       Pulmonary  Breath sounds clear to auscultation               Abdominal  GI exam deferred       Other Findings            Anesthetic Plan    ASA: 2  Anesthesia type: epidural            Anesthetic plan and risks discussed with: Patient and Family

## 2020-05-20 NOTE — ANESTHESIA PROCEDURE NOTES
Epidural Block    Performed by: Carol Willoughby MD  Authorized by: Carol Willoughby MD     Pre-Procedure  Indication: labor epidural    Preanesthetic Checklist: patient identified, risks and benefits discussed, anesthesia consent, site marked, patient being monitored, timeout performed and anesthesia consent      Epidural:   Patient position:  Seated  Prep region:  Lumbar  Prep: DuraPrep    Location:  L3-4    Needle and Epidural Catheter:   Needle Type:  Tuohy  Needle Gauge:  17 G  Injection Technique:  Loss of resistance using air  Attempts:  1  Catheter Size:  19 G  Catheter at Skin Depth (cm):  12  Events: no blood with aspiration, no cerebrospinal fluid with aspiration, no paresthesia and negative aspiration test    Test Dose:  Bupivacaine 0.25% w/ epi and negative    Assessment:   Catheter Secured:  Tape and tegaderm  Insertion:  Uncomplicated  Patient tolerance:  Patient tolerated the procedure well with no immediate complications

## 2020-05-20 NOTE — H&P
History & Physical    Name: Kameron Sheriff MRN: [id number]  SSN: [id number]    YOB: 1990  Age: 26 y.o. Sex: female        Subjective:     Estimated Date of Delivery: 20  OB History    Para Term  AB Living   2 1 1     1   SAB TAB Ectopic Molar Multiple Live Births             1      # Outcome Date GA Lbr Eduardo/2nd Weight Sex Delivery Anes PTL Lv   2 Current            1 Term 09    F Vag-Spont   NED       Ms. Belia Mcnair is a 31yo [id number] admitted with pregnancy at 39w5d for PROM. Patient reports some leaking today around noon but thought it was urine, then she had a bigger leak around 1600 and has been continuing to leak. She reports she started to feel some contractions around 1600 as well. She denies vaginal bleeding and reports good fetal movement. Prenatal course was complicated by appendectomy during pregnancy at approximately 20 weeks; cyst on fallopian tube was removed at that time as well. There was also some concern for growth restriction during the pregnancy which improved, EFW 7% at 28wks; 10% at 31wks 11% at 35 wks and then 21% at 37 weeks. Please see prenatal records for details. Nitrazine positive on arrival and amnisure positive as well.   She reports she was barely 1cm at her last exam.    Past Medical History:   Diagnosis Date    Abnormal Papanicolaou smear of cervix     Headache     Hypertension     Lumbar degenerative disc disease     Other ill-defined conditions(799.89)     kidney infections    Other ill-defined conditions(799.89)     Migraines    Ovarian cyst     Postpartum depression     Ruptured ovarian cyst 2018     Past Surgical History:   Procedure Laterality Date    HX APPENDECTOMY  2020    Laparoscopic appendectomy, enterolysis & excision right fallopian tube cyst by Dr Connor Jacobs    HX ORTHOPAEDIC      pelvic bone fx     Social History     Occupational History    Not on file   Tobacco Use    Smoking status: Former Smoker    Smokeless tobacco: Never Used    Tobacco comment: Very rarely   Substance and Sexual Activity    Alcohol use: No    Drug use: No    Sexual activity: Yes     Birth control/protection: Injection     Family History   Problem Relation Age of Onset    No Known Problems Mother     No Known Problems Father     No Known Problems Brother        Allergies   Allergen Reactions    Ciprofloxacin Hives    Pcn [Penicillins] Hives    Percocet [Oxycodone-Acetaminophen] Other (comments)     Becomes sweaty and passes out     Prior to Admission medications    Medication Sig Start Date End Date Taking? Authorizing Provider   PNV No12-Iron-FA-DSS-OM-3 29 mg iron-1 mg -50 mg CPKD Take 1 Cap by mouth daily. Yes Provider, Historical   acetaminophen (TYLENOL) 325 mg tablet Take 325 mg by mouth every four (4) hours as needed for Pain. Yes Provider, Historical   butalbital-acetaminophen-caffeine (FIORICET, ESGIC) -40 mg per tablet TAKE 1 TABLET BY MOUTH EVERY 4 HOURS AS NEEDED FOR PAIN 11/18/19  Yes Major Frida, NP   rizatriptan (MAXALT) 10 mg tablet TAKE 1 TABLET BY MOUTH ONCE AS NEEDED FOR MIGRAINE FOR UP TO 1 DOSE. MAY REPEAT IN 2 HOURS IF NEEDED. 11/18/18  Yes Katharina Robles MD   topiramate (TOPAMAX) 50 mg tablet Take 50 mg by mouth nightly. 6/6/18   Provider, Historical   ondansetron (ZOFRAN ODT) 8 mg disintegrating tablet Take 1 Tab by mouth every eight (8) hours as needed for Nausea. 2/19/19   Major Frida, NP        Review of Systems: all systems reviewed negative except those stated in the hpi    Objective:     Vitals:  Vitals:    05/19/20 2010 05/19/20 2012 05/19/20 2030 05/19/20 2035   BP:       Pulse:       Resp:       Temp:       SpO2: 97%  98% 98%   Weight:  84.8 kg (187 lb)     Height:  5' 6\" (1.676 m)          Physical Exam:  Patient without distress.   Heart: Regular rate and rhythm  Lung: normal respiratory effort  Abdomen: soft, gravid, nontender  Perineum: blood absent, amniotic fluid absent  Cervical Exam: 2-3/90/-1  Lower Extremities: no calf tenderness  Membranes:  Premature Rupture of Membranes;  Amniotic Fluid: clear fluid  Fetal Heart Rate: Reactive; 120's moderate variability, +accels, no decels    Prenatal Labs:   A positive  Rubella non-immune  HBsAg negative  HIV nonreactive  TPA negative  GC/chlamydia negative  glucola 107  GBS positive (sensitive to clindamycin)    Lab Results   Component Value Date/Time    ABO/Rh(D) A POS 05/05/2010 01:30 PM         Assessment/Plan:     Active Problems:    Pregnancy (5/19/2020)         31yo [id number] @ 39w5d with PROM  -prenatal records reviewed  -admit to L&D  -CBC, sample  -clindamycin for GBS  -may ambulate  -will start pitocin  -epidural when desired  -reactive FHT  -needs rubella vaccine postpartum  -TDaP received 3/2/20  -all questions answered

## 2020-05-20 NOTE — PROGRESS NOTES
2002: Pt arrived from home with c/o LOF since noon. Pt states she started having a few contractions around 1530. Pt reports H/A but has a hx of migraines. Pt denies vag bleeding, blurry vision, RUQ pain. Pt sees Dr. Rico Babin and states she is scheduled for induction on 20.    2019: Amnisure positive for rupture. : Dr. Kirsty Robertson made aware of results. Orders received to move pt to L&D room. : Pt moved to [facility].    : Dr. Toni Molina at the bedside to place epidural.  Consent signed. 2302: Epidural placed. EFM repositioned. 0235:  of live baby girl by Dr. Kirsty Robertson. : Bedside shift change report given to JEFERSON Sandoval (oncoming nurse) by Ning Thompson (offgoing nurse). Report included the following information SBAR.

## 2020-05-20 NOTE — PROCEDURES
Delivery Note    Obstetrician:  Regina Rabago DO    Assistant: none    Pre-Delivery Diagnosis: Term pregnancy and PROM    Post-Delivery Diagnosis: Living  female infant    Intrapartum Event: None and Premature Rupture of Membranes (greater than 1 hour prior to onset of labor)    Procedure: Spontaneous vaginal delivery    Epidural: YES    Monitor:  Fetal Heart Tones - External and Uterine Contractions - External    Indications for instrumental delivery: none    Estimated Blood Loss: 150cc    Episiotomy: none    Laceration(s):  Very small vaginal    Laceration(s) repair: YES    Presentation: Cephalic    Fetal Description: benz    Fetal Position: Occiput Anterior    Birth Weight: delivery summary     Birth Length: delivery summary    Apgar - One Minute: 8    Apgar - Five Minutes: 9    Umbilical Cord: 3 vessels present  Specimens: none  Complications:  none           Cord Blood Results:   Information for the patient's :  Louie Ren [[id number]]   No results found for: PCTABR, PCTDIG, BILI, ABORH    Prenatal Labs:     Lab Results   Component Value Date/Time    ABO/Rh(D) A POS 2010 01:30 PM    HBsAg, External Negative 10/22/2019    HIV, External Non reactive 10/22/2019    Rubella, External Equivocal 10/22/2019    Gonorrhea, External neg 10/22/2019    Chlamydia, External neg 10/22/2019    GrBStrep, External positive 2020        Attending Attestation: I performed the procedure    Patient is a 31yo G3 now P2 who presented in the evening around 2000 after PROM at 1200. She initially thought she was just urinating but then had more of a gush around 1600 and started to have some contractions so presented to L&D. She was 2-3cm on arrival.  Clindamycin was started for GBS secondary to PCN allergy. Pitocin was started. Patient received her epidural.  She was found to be fully dilated around 0230. She pushed through 2 contractions.    over intact perineum of viable female infant at 0474 10 89 86, OA; left shoulder delivered anterior without difficulty as did the rest of the body. Baby was placed on mom's lower abdomen secondary to a short cord. Delayed cord clamping was done. After one minute the cord was clamped and cut by FOB. Placenta delivered spontaneous and intact. Pitocin was started. After delivery there was a very small vaginal laceration right inside the introitus which was bleeding so one figure of eight stitch using 3-0 vicryl was used and good hemostasis obtained. Mom and baby doing well. This is a late entry as Epic was down.

## 2020-05-20 NOTE — PROGRESS NOTES
0745: Patient OOB with assist. Steady gait noted. Kristen care done. Patient back to bed. Call bell in reach. 1100: Patient moved to room 3314.

## 2020-05-20 NOTE — ROUTINE PROCESS
Bedside and Verbal shift change report given to JEFERSON Munoz (oncoming nurse) by Desirae Villalobos RN (offgoing nurse). Report included the following information SBAR.

## 2020-05-20 NOTE — LACTATION NOTE
Discussed with mother her plan for feeding. Reviewed the benefits of exclusive breast milk feeding during the hospital stay. Informed mother of the risks of using formula to supplement in the first few days of life as well as the benefits of successful breast milk feeding. Mother acknowledges understanding of information reviewed and states that it is her plan to breast milk and formula feed her infant. Will support her choice and offer additional information as needed.

## 2020-05-20 NOTE — PROGRESS NOTES
Post-Partum Day Number 0 Progress Note    Luna Mojica     Assessment: Doing well, post partum day 0    Plan:  1. Continue routine postpartum and perineal care as well as maternal education. Information for the patient's :  Alejandra Donato [[id number]]   Vaginal, Spontaneous   Patient doing well without significant complaint. Voiding without difficulty, normal lochia. Vitals:  Visit Vitals  /76 (BP 1 Location: Left arm, BP Patient Position: At rest)   Pulse 76   Temp 97.5 °F (36.4 °C)   Resp 16   Ht 5' 6\" (1.676 m)   Wt 84.8 kg (187 lb)   LMP 2019   SpO2 100%   Breastfeeding Yes   BMI 30.18 kg/m²     Temp (24hrs), Av.9 °F (36.6 °C), Min:97.1 °F (36.2 °C), Max:98.5 °F (36.9 °C)        Exam:   Patient without distress. Abdomen soft, fundus firm, nontender                Perineum with normal lochia noted. Lower extremities are negative for swelling, cords or tenderness.     Labs:     Lab Results   Component Value Date/Time    WBC 12.8 (H) 2020 09:10 PM    WBC 11.1 (H) 2020 02:52 PM    WBC 8.9 2020 09:29 AM    WBC 11.3 (H) 2018 05:36 PM    WBC 8.4 2017 08:45 AM    WBC 6.9 2017 06:51 AM    WBC 6.9 2017 12:00 PM    WBC 7.2 2015 09:22 AM    WBC 6.1 10/23/2014 09:08 AM    WBC 9.0 2012 10:35 PM    WBC 8.8 2012 11:25 PM    WBC 6.5 2011 04:10 PM    WBC 6.8 2010 03:45 PM    WBC 9.7 2010 03:25 PM    WBC 6.2 2010 11:40 AM    WBC 5.5 2009 12:30 AM    WBC 7.6 2009 01:30 PM    WBC 9.8 2009 07:45 PM    HGB 12.5 2020 09:10 PM    HGB 12.9 2020 02:52 PM    HGB 11.7 2020 09:29 AM    HGB 13.9 2018 05:36 PM    HGB 12.7 2017 08:45 AM    HGB 12.6 2017 06:51 AM    HGB 14.1 2017 12:00 PM    HGB 12.9 2015 09:22 AM    HGB 13.3 10/23/2014 09:08 AM    HGB 13.1 2012 10:35 PM    HGB 12.7 2012 11:25 PM    HGB 11.1 (L) 02/16/2011 04:10 PM    HGB 12.3 08/26/2010 03:45 PM    HGB 12.4 05/05/2010 03:25 PM    HGB 11.8 02/18/2010 11:40 AM    HGB 11.2 (L) 07/17/2009 12:30 AM    HGB 9.3 (L) 03/20/2009 01:30 PM    HGB 10.5 (L) 02/04/2009 07:45 PM    HCT 38.7 05/19/2020 09:10 PM    HCT 38.9 01/09/2020 02:52 PM    HCT 35.5 01/04/2020 09:29 AM    HCT 41.7 05/20/2018 05:36 PM    HCT 38.5 12/20/2017 08:45 AM    HCT 37.8 06/20/2017 06:51 AM    HCT 43.4 02/08/2017 12:00 PM    HCT 39.9 01/28/2015 09:22 AM    HCT 40.7 10/23/2014 09:08 AM    HCT 38.9 08/09/2012 10:35 PM    HCT 38.4 04/29/2012 11:25 PM    HCT 34.5 (L) 02/16/2011 04:10 PM    HCT 37.3 08/26/2010 03:45 PM    HCT 36.9 05/05/2010 03:25 PM    HCT 36.0 02/18/2010 11:40 AM    HCT 34.3 (L) 07/17/2009 12:30 AM    HCT 29.6 (L) 03/20/2009 01:30 PM    HCT 32.3 (L) 02/04/2009 07:45 PM    PLATELET 601 52/51/8122 09:10 PM    PLATELET 580 89/93/8153 02:52 PM    PLATELET 204 72/36/3141 09:29 AM    PLATELET 051 38/24/8801 05:36 PM    PLATELET 246 44/52/8447 08:45 AM    PLATELET 346 52/72/8450 06:51 AM    PLATELET 543 10/60/5974 12:00 PM    PLATELET 568 56/49/8780 09:22 AM    PLATELET 029 00/86/8544 09:08 AM    PLATELET 522 18/50/0979 10:35 PM    PLATELET 443 48/53/5671 11:25 PM    PLATELET 678 55/72/7603 04:10 PM    PLATELET 326 10/04/4402 03:45 PM    PLATELET 327 95/16/5991 03:25 PM    PLATELET 644 82/91/3619 11:40 AM    PLATELET 606 26/74/7349 12:30 AM    PLATELET 446 46/19/8248 01:30 PM    PLATELET 108 75/03/8418 07:45 PM       Recent Results (from the past 24 hour(s))   RUPTURE OF FETAL MEMBRANES, POC    Collection Time: 05/19/20  8:19 PM   Result Value Ref Range    Rupture of fetal membrane Positive Negative    Control line present?  Acceptable     Internal negative control Acceptable     Kit Lot No. [id number]     Expiration date [id number]    CBC WITH AUTOMATED DIFF    Collection Time: 05/19/20  9:10 PM   Result Value Ref Range    WBC 12.8 (H) 3.6 - 11.0 K/uL    RBC 4.20 3.80 - 5.20 M/uL    HGB 12.5 11.5 - 16.0 g/dL    HCT 38.7 35.0 - 47.0 %    MCV 92.1 80.0 - 99.0 FL    MCH 29.8 26.0 - 34.0 PG    MCHC 32.3 30.0 - 36.5 g/dL    RDW 13.7 11.5 - 14.5 %    PLATELET 112 423 - 721 K/uL    MPV 11.0 8.9 - 12.9 FL    NRBC 0.0 0  WBC    ABSOLUTE NRBC 0.00 0.00 - 0.01 K/uL    NEUTROPHILS 77 (H) 32 - 75 %    LYMPHOCYTES 15 12 - 49 %    MONOCYTES 6 5 - 13 %    EOSINOPHILS 1 0 - 7 %    BASOPHILS 0 0 - 1 %    IMMATURE GRANULOCYTES 1 (H) 0.0 - 0.5 %    ABS. NEUTROPHILS 9.9 (H) 1.8 - 8.0 K/UL    ABS. LYMPHOCYTES 1.9 0.8 - 3.5 K/UL    ABS. MONOCYTES 0.8 0.0 - 1.0 K/UL    ABS. EOSINOPHILS 0.1 0.0 - 0.4 K/UL    ABS. BASOPHILS 0.0 0.0 - 0.1 K/UL    ABS. IMM.  GRANS. 0.1 (H) 0.00 - 0.04 K/UL    DF AUTOMATED

## 2020-05-21 VITALS
HEIGHT: 66 IN | RESPIRATION RATE: 17 BRPM | OXYGEN SATURATION: 100 % | TEMPERATURE: 97.4 F | BODY MASS INDEX: 30.05 KG/M2 | WEIGHT: 187 LBS | HEART RATE: 79 BPM | SYSTOLIC BLOOD PRESSURE: 121 MMHG | DIASTOLIC BLOOD PRESSURE: 94 MMHG

## 2020-05-21 PROCEDURE — 74011250637 HC RX REV CODE- 250/637: Performed by: OBSTETRICS & GYNECOLOGY

## 2020-05-21 PROCEDURE — 90707 MMR VACCINE SC: CPT | Performed by: OBSTETRICS & GYNECOLOGY

## 2020-05-21 PROCEDURE — 74011250636 HC RX REV CODE- 250/636: Performed by: OBSTETRICS & GYNECOLOGY

## 2020-05-21 RX ORDER — IBUPROFEN 600 MG/1
600 TABLET ORAL
Qty: 30 TAB | Refills: 1 | Status: SHIPPED | OUTPATIENT
Start: 2020-05-21 | End: 2020-08-25

## 2020-05-21 RX ORDER — HYDROCODONE BITARTRATE AND ACETAMINOPHEN 5; 325 MG/1; MG/1
1 TABLET ORAL
Qty: 12 TAB | Refills: 0 | Status: SHIPPED | OUTPATIENT
Start: 2020-05-21 | End: 2020-05-24

## 2020-05-21 RX ORDER — DOCUSATE SODIUM 100 MG/1
100 CAPSULE, LIQUID FILLED ORAL
Qty: 30 CAP | Refills: 1 | Status: SHIPPED | OUTPATIENT
Start: 2020-05-21 | End: 2020-08-25

## 2020-05-21 RX ADMIN — HYDROCODONE BITARTRATE AND ACETAMINOPHEN 1 TABLET: 5; 325 TABLET ORAL at 01:00

## 2020-05-21 RX ADMIN — IBUPROFEN 800 MG: 400 TABLET, FILM COATED ORAL at 01:00

## 2020-05-21 RX ADMIN — MEASLES, MUMPS, AND RUBELLA VIRUS VACCINE LIVE 0.5 ML: 1000; 12500; 1000 INJECTION, POWDER, LYOPHILIZED, FOR SUSPENSION SUBCUTANEOUS at 12:38

## 2020-05-21 RX ADMIN — HYDROCODONE BITARTRATE AND ACETAMINOPHEN 1 TABLET: 5; 325 TABLET ORAL at 06:46

## 2020-05-21 RX ADMIN — IBUPROFEN 800 MG: 400 TABLET, FILM COATED ORAL at 08:41

## 2020-05-21 NOTE — LACTATION NOTE
This note was copied from a baby's chart. P2  Breast and formula feeding in combination. 5 breast feedings latch of 9  86 ml formula. LC introduced and offered assistance prn. Mother states more likely to formula feed more than breastfeed. Reviewed supply and demand and pumping options to protect lactogenesis. Voices understanding    Breast- Feeding Assessment  Attends Breast-Feeding Classes: No  Breast-Feeding Experience: Yes  Breast Trauma/Surgery: No  Type/Quality: Good  Lactation Consultant Visits  Breast-Feedings: Good      LATCH Documentation  Latch: Grasps breast, tongue down, lips flanged, rhythmic sucking  Audible Swallowing: A few with stimulation  Type of Nipple: Everted (after stimulation)  Comfort (Breast/Nipple): Soft/non-tender  Hold (Positioning): No assist from staff, mother able to position/hold infant  LATCH Score: 9    LC # provided. Call prn for questions or concerns. Anticipates discharge today.

## 2020-05-21 NOTE — DISCHARGE INSTRUCTIONS
Patient Education        Postpartum: Care Instructions  Your Care Instructions  After childbirth (postpartum period), your body goes through many changes. Some of these changes happen over several weeks. In the hours after delivery, your body will begin to recover from childbirth while it prepares to breastfeed your . You may feel emotional during this time. Your hormones can shift your mood without warning for no clear reason. In the first couple of weeks after childbirth, many women have emotions that change from happy to sad. You may find it hard to sleep. You may cry a lot. This is called the \"baby blues. \" These overwhelming emotions often go away within a couple of days or weeks. But it's important to discuss your feelings with your doctor. It is easy to get too tired and overwhelmed during the first weeks after childbirth. Don't try to do too much. Get rest whenever you can, accept help from others, and eat well and drink plenty of fluids. In the first couple of weeks after giving birth, your doctor or midwife may want to check in with you and make a plan for any follow-up care you may need. You will likely have a complete postpartum visit in the first 3 months after delivery. At that time, your doctor or midwife will check on your recovery from childbirth. He or she will also see how you are doing with your emotions and talk about your concerns or questions. Follow-up care is a key part of your treatment and safety. Be sure to make and go to all appointments, and call your doctor if you are having problems. It's also a good idea to know your test results and keep a list of the medicines you take. How can you care for yourself at home? · Sleep or rest when your baby sleeps. · Get help with household chores from family or friends, if you can. Do not try to do it all yourself. · If you have hemorrhoids or swelling or pain around the opening of your vagina, try using cold and heat.  You can put ice or a cold pack on the area for 10 to 20 minutes at a time. Put a thin cloth between the ice and your skin. Also try sitting in a few inches of warm water (sitz bath) 3 times a day and after bowel movements. · Take pain medicines exactly as directed. ? If the doctor gave you a prescription medicine for pain, take it as prescribed. ? If you are not taking a prescription pain medicine, ask your doctor if you can take an over-the-counter medicine. · Eat more fiber to avoid constipation. Include foods such as whole-grain breads and cereals, raw vegetables, raw and dried fruits, and beans. · Drink plenty of fluids, enough so that your urine is light yellow or clear like water. If you have kidney, heart, or liver disease and have to limit fluids, talk with your doctor before you increase the amount of fluids you drink. · Do not rinse inside your vagina with fluids (douche). · If you have stitches, keep the area clean by pouring or spraying warm water over the area outside your vagina and anus after you use the toilet. · Keep a list of questions to ask your doctor or midwife. Your questions might be about:  ? Changes in your breasts, such as lumps or soreness. ? When to expect your menstrual period to start again. ? What form of birth control is best for you. ? Weight you have put on during the pregnancy. ? Exercise options. ? What foods and drinks are best for you, especially if you are breastfeeding. ? Problems you might be having with breastfeeding. ? When you can have sex. Some women may want to talk about lubricants for the vagina. ? Any feelings of sadness or restlessness that you are having. When should you call for help? Call 911 anytime you think you may need emergency care.  For example, call if:    · You have thoughts of harming yourself, your baby, or another person.     · You passed out (lost consciousness).     · You have chest pain, are short of breath, or cough up blood.     · You have a seizure.    Call your doctor now or seek immediate medical care if:    · Your vaginal bleeding seems to be getting heavier.     · You are dizzy or lightheaded, or you feel like you may faint.     · You have a fever.     · You have new or more belly pain.     · You have symptoms of a blood clot in your leg (called a deep vein thrombosis), such as:  ? Pain in the calf, back of the knee, thigh, or groin. ? Redness and swelling in your leg or groin.     · You have signs of preeclampsia, such as:  ? Sudden swelling of your face, hands, or feet. ? New vision problems (such as dimness, blurring, or seeing spots). ? A severe headache.    Watch closely for changes in your health, and be sure to contact your doctor if:    · You have new or worse vaginal discharge.     · You feel sad or depressed.     · You are having problems with your breasts or breastfeeding. Where can you learn more? Go to http://vilma-francois.info/  Enter A770 in the search box to learn more about \"Postpartum: Care Instructions. \"  Current as of: May 29, 2019Content Version: 12.4  © 7336-0472 [employer]. Care instructions adapted under license by [employer] (which disclaims liability or warranty for this information). If you have questions about a medical condition or this instruction, always ask your healthcare professional. [address] any warranty or liability for your use of this information.

## 2020-05-21 NOTE — PROGRESS NOTES
Post-Partum Day Number 1 Progress Note    Luna Mojica     Assessment: Doing well, post partum day 1 from  p term PROM. Plan:   1. Discharge home today  2. Follow up in office in 6 weeks with Satnam Zuñiga MD  3. Post partum activity advised, diet as tolerated  4. Discharge Medications: ibuprofen, percocet(patient requests and has been requiring), PNV, colace, and medications prior to admission    Information for the patient's :  Alejandra Donato [[id number]]   Vaginal, Spontaneous     Patient doing well without significant complaint. Voiding without difficulty, normal lochia. Ambulating, tolerating a diet. Vitals:  Visit Vitals  BP (!) 121/94 (BP 1 Location: Right arm, BP Patient Position: Post activity)   Pulse 79   Temp 97.4 °F (36.3 °C)   Resp 17   Ht 5' 6\" (1.676 m)   Wt 84.8 kg (187 lb)   LMP 2019   SpO2 100%   Breastfeeding Yes   BMI 30.18 kg/m²     Temp (24hrs), Av.5 °F (36.4 °C), Min:97.4 °F (36.3 °C), Max:97.7 °F (36.5 °C)      Exam:         Patient without distress. Abdomen soft, fundus firm, nontender                 Lower extremities are negative for swelling, cords or tenderness. Labs:   No results found for this or any previous visit (from the past 24 hour(s)).

## 2020-05-21 NOTE — PROGRESS NOTES
Discharge instructions reviewed and signed. Patient acknowledges understanding. Prescriptions given and paperwork signed. Copy of discharge instructions given to patient. Follow up with OB in 6 weeks.

## 2020-05-21 NOTE — DISCHARGE SUMMARY
Obstetrical Discharge Summary     Name: Lyric Aldridge MRN: [id number]  SSN: [id number]    YOB: 1990  Age: 26 y.o. Sex: female      Admit Date: 2020    Discharge Date: 2020     Admitting Physician: Ebony Ramirez DO     Attending Physician:  Clau Avalos MD     Admission Diagnoses: Pregnancy [Z34.90]    Procedure Performed:  * No surgery found *      Discharge Diagnoses:   Information for the patient's :  Santos Mendoza [[id number]]   Delivery of a 2.915 kg female infant via Vaginal, Spontaneous on 2020 at 2:35 AM  by Ebony Ramirez. Apgars were 8  and 9 . Additional Diagnoses:   Hospital Problems  Date Reviewed: 2020          Codes Class Noted POA    Pregnancy ICD-10-CM: Z34.90  ICD-9-CM: V22.2  2020 Unknown             Lab Results   Component Value Date/Time    Rubella, External Equivocal 10/22/2019    GrBStrep, External positive 2020       Hospital Course:  p term PROM. Normal hospital course following the delivery. Patient Disposition: Home      Followup Care:  Discharge Condition: good  No sex for 6 weeks  Regular Diet  Ice to area for comfort    Patient Instructions:   Current Discharge Medication List      START taking these medications    Details   ibuprofen (MOTRIN) 600 mg tablet Take 1 Tab by mouth every six (6) hours as needed for Pain. Qty: 30 Tab, Refills: 1      docusate sodium (Colace) 100 mg capsule Take 1 Cap by mouth two (2) times daily as needed for Constipation for up to 30 doses. Qty: 30 Cap, Refills: 1      HYDROcodone-acetaminophen (NORCO) 5-325 mg per tablet Take 1 Tab by mouth every six (6) hours as needed for Pain for up to 3 days. Max Daily Amount: 4 Tabs. Qty: 12 Tab, Refills: 0    Associated Diagnoses: 39 weeks gestation of pregnancy         CONTINUE these medications which have NOT CHANGED    Details   PNV No12-Iron-FA-DSS-OM-3 29 mg iron-1 mg -50 mg CPKD Take 1 Cap by mouth daily.       rizatriptan (MAXALT) 10 mg tablet TAKE 1 TABLET BY MOUTH ONCE AS NEEDED FOR MIGRAINE FOR UP TO 1 DOSE. MAY REPEAT IN 2 HOURS IF NEEDED. Qty: 9 Tab, Refills: 3    Comments: Please consider 90 day supplies to promote better adherence      topiramate (TOPAMAX) 50 mg tablet Take 50 mg by mouth nightly. STOP taking these medications       acetaminophen (TYLENOL) 325 mg tablet Comments:   Reason for Stopping:         butalbital-acetaminophen-caffeine (FIORICET, ESGIC) -40 mg per tablet Comments:   Reason for Stopping:         ondansetron (ZOFRAN ODT) 8 mg disintegrating tablet Comments:   Reason for Stopping:               Reference my discharge instructions.     Follow-up Appointments   Procedures    FOLLOW UP VISIT Appointment in: 6 Weeks     Standing Status:   Standing     Number of Occurrences:   1     Order Specific Question:   Appointment in     Answer:   6 Weeks        Signed By:  Margaux Fitzpatrick MD     May 21, 2020

## 2020-06-17 ENCOUNTER — VIRTUAL VISIT (OUTPATIENT)
Dept: FAMILY MEDICINE CLINIC | Age: 30
End: 2020-06-17

## 2020-06-17 DIAGNOSIS — J34.89 SINUS PRESSURE: Primary | ICD-10-CM

## 2020-06-17 NOTE — PROGRESS NOTES
Ramon Morin is a 26 y.o. female    Chief Complaint   Patient presents with    Establish Care    Headache     3-4 days, Fioricet not working.  Sinus Infection     1. Have you been to the ER, urgent care clinic since your last visit? Hospitalized since your last visit? No    2. Have you seen or consulted any other health care providers outside of the [address] [place] [address] since your last visit? Include any pap smears or colon screening.  No    Pt states she just had a baby 5/5/220

## 2020-06-17 NOTE — PROGRESS NOTES
Madie Still is a 26 y.o. female who was seen by synchronous (real-time) audio-video technology on 6/17/2020. Consent: Madie Still, who was seen by synchronous (real-time) audio-video technology, and/or her healthcare decision maker, is aware that this patient-initiated, Telehealth encounter on 6/17/2020 is a billable service, with coverage as determined by her insurance carrier. She is aware that she may receive a bill and has provided verbal consent to proceed: Yes. Assessment & Plan:   Diagnoses and all orders for this visit:    1. Sinus pressure      With clear mucus and PND, x 4 days, has been warm and rainy, likely due to allergies  Continue once daily allergy pill from [employer], 2 sprays per nostril daily, x 2-4 weeks  Add Mucinex-D, regular strength, 1-2 pills q12hrs prn  Increase water intake  Hot compresses across face  Steam breathing  Call if localized sinus pain in one week with purulent mucus, will call in antibx    I spent at least 16 minutes on this visit with this established patient. 712  Subjective:   Madie Still is a 26 y.o. female who was seen for [address]; Headache (3-4 days, Fioricet not working. ); and Sinus Infection    Former patient of Jose Sanchez NP, who has recently left the practice, and patient is seeing me to establish care. Last visit was with  SALVADOR Encarnacion 2/19/2019 for migraine x 3 days, on topamax, maxalt, ibuprofen and tylenol and , treated with zofran, toradol injection and fioricet #20. She is 4 weeks post partum, baby girl born 5/20/20. She is not breastfeeding. Baby is sleeping well! She also has an 5 yo daughter. She works as a  at [employer], but it closed 3/17/20 due to the pandemic. She plans to return to work after some time off with the baby. She C/O PND and pain and pressure on the right side of head and around both eyes and maxillary sinuses x 4 days. She has h/o sinusitis.  She takes a once daily OTC allergy pill, generic for either claritin or zyrtec. Has used flonase, but not now. No fevers. No cough. She has clear mucus. Migraines - she had to stop topamax while pregnant, sees neurologist once yearly, took imitrex and fioricet while pregnant. Prior to Admission medications    Medication Sig Start Date End Date Taking? Authorizing Provider   docusate sodium (Colace) 100 mg capsule Take 1 Cap by mouth two (2) times daily as needed for Constipation for up to 30 doses. 5/21/20  Yes Edith Hackett MD   PNV No12-Iron-FA-DSS-OM-3 29 mg iron-1 mg -50 mg CPKD Take 1 Cap by mouth daily. Yes Provider, Historical   rizatriptan (MAXALT) 10 mg tablet TAKE 1 TABLET BY MOUTH ONCE AS NEEDED FOR MIGRAINE FOR UP TO 1 DOSE. MAY REPEAT IN 2 HOURS IF NEEDED. 11/18/18  Yes Timmy Roy MD   ibuprofen (MOTRIN) 600 mg tablet Take 1 Tab by mouth every six (6) hours as needed for Pain. 5/21/20   Edith Hackett MD   topiramate (TOPAMAX) 50 mg tablet Take 50 mg by mouth nightly. 6/6/18   Provider, Historical     Allergies   Allergen Reactions    Ciprofloxacin Hives    Pcn [Penicillins] Hives    Percocet [Oxycodone-Acetaminophen] Other (comments)     Becomes sweaty and passes out       Patient Active Problem List    Diagnosis Date Noted    Pregnancy 05/19/2020    Acute appendicitis complicating pregnancy 68/54/1275    Lumbar degenerative disc disease 08/03/2018    Lumbar radiculopathy 08/03/2018    Cervicalgia 06/07/2018    Lumbar back pain with radiculopathy affecting left lower extremity 05/22/2018    Intractable migraine without aura and with status migrainosus 02/05/2018    Sleep disturbance 01/30/2018    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  30 Cap 1    PNV No12-Iron-FA-DSS-OM-3 29 mg iron-1 mg -50 mg CPKD Take 1 Cap by mouth daily.  rizatriptan (MAXALT) 10 mg tablet TAKE 1 TABLET BY MOUTH ONCE AS NEEDED FOR MIGRAINE FOR UP TO 1 DOSE. MAY REPEAT IN 2 HOURS IF NEEDED. 9 Tab 3    ibuprofen (MOTRIN) 600 mg tablet Take 1 Tab by mouth every six (6) hours as needed for Pain. 30 Tab 1    topiramate (TOPAMAX) 50 mg tablet Take 50 mg by mouth nightly. Allergies   Allergen Reactions    Ciprofloxacin Hives    Pcn [Penicillins] Hives    Percocet [Oxycodone-Acetaminophen] Other (comments)     Becomes sweaty and passes out     Past Medical History:   Diagnosis Date    Abnormal Papanicolaou smear of cervix     Headache     Hypertension     Lumbar degenerative disc disease 2018    Other ill-defined conditions(799.89)     kidney infections    Other ill-defined conditions(799.89)     Migraines    Ovarian cyst     Postpartum depression     Ruptured ovarian cyst 2018     Past Surgical History:   Procedure Laterality Date    HX APPENDECTOMY  01/04/2020    Laparoscopic appendectomy, enterolysis & excision right fallopian tube cyst by Dr Gina Han    HX ORTHOPAEDIC      pelvic bone fx     Family History   Problem Relation Age of Onset    No Known Problems Mother     No Known Problems Father     No Known Problems Brother      Social History     Tobacco Use    Smoking status: Former Smoker    Smokeless tobacco: Never Used    Tobacco comment: Very rarely   Substance Use Topics    Alcohol use: No       ROS    Per HPI    Objective:     Visit Vitals  LMP 08/23/2019      General: alert, cooperative, no distress   Mental  status: normal mood, behavior, speech, dress, motor activity, and thought processes, able to follow commands   HENT: NCAT   Neck: no visualized mass   Resp: no respiratory distress   Neuro: no gross deficits   Skin: no discoloration or lesions of concern on visible areas   Psychiatric: normal affect, consistent with stated mood, no evidence of hallucinations     Additional exam findings:        We discussed the expected course, resolution and complications of the diagnosis(es) in detail. Medication risks, benefits, costs, interactions, and alternatives were discussed as indicated. I advised her to contact the office if her condition worsens, changes or fails to improve as anticipated. She expressed understanding with the diagnosis(es) and plan. Gaston Thayer is a 26 y.o. female who was evaluated by a video visit encounter for concerns as above. Patient identification was verified prior to start of the visit. A caregiver was present when appropriate. Due to this being a TeleHealth encounter (During GDNFW-32 public health emergency), evaluation of the following organ systems was limited: Vitals/Constitutional/EENT/Resp/CV/GI//MS/Neuro/Skin/Heme-Lymph-Imm. Pursuant to the emergency declaration under the [facility] waiver authority and the "[employer] and [employer] General Act, this Virtual  Visit was conducted, with patient's (and/or legal guardian's) consent, to reduce the patient's risk of exposure to COVID-19 and provide necessary medical care. Services were provided through a video synchronous discussion virtually to substitute for in-person clinic visit. Patient and provider were located at their individual homes.       Zuleika Tabor PA-C

## 2020-08-25 ENCOUNTER — OFFICE VISIT (OUTPATIENT)
Dept: URGENT CARE | Age: 30
End: 2020-08-25
Payer: MEDICAID

## 2020-08-25 VITALS — OXYGEN SATURATION: 99 % | RESPIRATION RATE: 16 BRPM | TEMPERATURE: 97.8 F | HEART RATE: 73 BPM

## 2020-08-25 DIAGNOSIS — R52 GENERALIZED BODY ACHES: Primary | ICD-10-CM

## 2020-08-25 DIAGNOSIS — R68.83 CHILLS: ICD-10-CM

## 2020-08-25 DIAGNOSIS — Z11.59 SCREENING FOR VIRAL DISEASE: ICD-10-CM

## 2020-08-25 PROCEDURE — 99213 OFFICE O/P EST LOW 20 MIN: CPT | Performed by: FAMILY MEDICINE

## 2020-08-25 RX ORDER — SUMATRIPTAN 100 MG/1
100 TABLET, FILM COATED ORAL
COMMUNITY

## 2020-08-25 NOTE — PROGRESS NOTES
This patient was seen in [facility] at [address] [place] [address] Urgent Care while in their vehicle due to COVID-19 pandemic with PPE and focused examination in order to decrease community viral transmission. The patient/guardian gave verbal consent to treat. Jane Rodriguez is a 26 y.o. female who presents chills, generalized body aches, fever, cough, SOB, and runny nose x 5 days. No known COVID-19 contacts. Works in a dental office. Currently on Clindamycin for dental infection. PMH: migraines. Former smoker. The history is provided by the patient.         Past Medical History:   Diagnosis Date    Abnormal Papanicolaou smear of cervix     Headache     Hypertension     Lumbar degenerative disc disease 2018    Other ill-defined conditions(799.89)     kidney infections    Other ill-defined conditions(799.89)     Migraines    Ovarian cyst     Postpartum depression     Ruptured ovarian cyst 2018        Past Surgical History:   Procedure Laterality Date    HX APPENDECTOMY  01/04/2020    Laparoscopic appendectomy, enterolysis & excision right fallopian tube cyst by Dr Alejandro Flynn    HX ORTHOPAEDIC      pelvic bone fx         Family History   Problem Relation Age of Onset    No Known Problems Mother     No Known Problems Father     No Known Problems Brother         Social History     Socioeconomic History    Marital status: SINGLE     Spouse name: Not on file    Number of children: Not on file    Years of education: Not on file    Highest education level: Not on file   Occupational History    Not on file   Social Needs    Financial resource strain: Not on file    Food insecurity     Worry: Not on file     Inability: Not on file    Transportation needs     Medical: Not on file     Non-medical: Not on file   Tobacco Use    Smoking status: Former Smoker    Smokeless tobacco: Never Used    Tobacco comment: Very rarely   Substance and Sexual Activity    Alcohol use: No    Drug use: No    Sexual activity: Yes     Birth control/protection: Injection   Lifestyle    Physical activity     Days per week: Not on file     Minutes per session: Not on file    Stress: Not on file   Relationships    Social connections     Talks on phone: Not on file     Gets together: Not on file     Attends Cheondoism service: Not on file     Active member of club or organization: Not on file     Attends meetings of clubs or organizations: Not on file     Relationship status: Not on file    Intimate partner violence     Fear of current or ex partner: Not on file     Emotionally abused: Not on file     Physically abused: Not on file     Forced sexual activity: Not on file   Other Topics Concern     Service Not Asked    Blood Transfusions Not Asked    Caffeine Concern Not Asked    Occupational Exposure Not Asked   Angelica Joanne Hazards Not Asked    Sleep Concern Not Asked    Stress Concern Not Asked    Weight Concern Not Asked    Special Diet Not Asked    Back Care Not Asked    Exercise Not Asked    Bike Helmet Not Asked   [address] Not Asked    Self-Exams Not Asked   Social History Narrative    Not on file                ALLERGIES: Ciprofloxacin; Pcn [penicillins]; and Percocet [oxycodone-acetaminophen]    Review of Systems   Constitutional: Positive for appetite change, chills and fever. Negative for activity change. HENT: Positive for congestion and rhinorrhea. Negative for sore throat. Respiratory: Positive for cough and shortness of breath. Negative for wheezing. Cardiovascular: Negative for chest pain. Gastrointestinal: Negative for abdominal pain, diarrhea, nausea and vomiting. Musculoskeletal: Positive for myalgias. Neurological: Negative for headaches. Vitals:    08/25/20 0912   Pulse: 73   Resp: 16   Temp: 97.8 °F (36.6 °C)   SpO2: 99%       Physical Exam  Vitals signs and nursing note reviewed. Constitutional:       General: She is not in acute distress.      Appearance: She is well-developed. She is not diaphoretic. HENT:      Nose: Congestion and rhinorrhea present. Pulmonary:      Effort: Pulmonary effort is normal. No respiratory distress. Breath sounds: Normal breath sounds. No stridor. No wheezing, rhonchi or rales. Neurological:      Mental Status: She is alert. Psychiatric:         Behavior: Behavior normal.         Thought Content: Thought content normal.         Judgment: Judgment normal.         MDM    ICD-10-CM ICD-9-CM   1. Generalized body aches  R52 780.96   2. Chills  R68.83 780.64   3. Screening for viral disease  Z11.59 V73.99       Orders Placed This Encounter    NOVEL CORONAVIRUS (COVID-19)     Scheduling Instructions:      1) Due to current limited availability of the COVID-19 PCR test, tests will be prioritized and may not be completed.              2) Order only if the test result will change clinical management or necessary for a return to mission-critical employment decision.              3) Print and instruct patient to adhere to CDC home isolation program. (Link Above)              4) Set up or refer patient for a monitoring program.              5) Have patient sign up for and leverage Write.myt (if not previously done). Order Specific Question:   Is this test for diagnosis or screening? Answer:   Diagnosis of ill patient     Order Specific Question:   Symptomatic for COVID-19 as defined by CDC? Answer:   Yes     Order Specific Question:   Date of Symptom Onset     Answer:   8/28/2020     Order Specific Question:   Hospitalized for COVID-19? Answer:   No     Order Specific Question:   Admitted to ICU for COVID-19? 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 Answer:   No     Order Specific Question:   Pregnant? Answer:   No     Order Specific Question:   Previously tested for COVID-19?      Answer: No        Self Quarantine  Deep breathing exercises  Tylenol prn  Increase fluids    If signs and symptoms become worse the pt is to go to the ER.          Procedures

## 2020-08-25 NOTE — LETTER
NOTIFICATION RETURN TO WORK / SCHOOL 
 
8/25/2020 9:16 AM 
 
Ms. Mary Vazquez 67 [facility] To Whom It May Concern: 
 
Mary Vazquez was seen today at [address]. She is to quarantine and await test results. Sincerely, Kylie Alcala MD

## 2020-08-27 LAB — SARS-COV-2, NAA: NOT DETECTED

## 2021-02-09 ENCOUNTER — HOSPITAL ENCOUNTER (EMERGENCY)
Age: 31
Discharge: HOME | End: 2021-02-09
Attending: EMERGENCY MEDICINE
Payer: MEDICAID

## 2021-02-09 ENCOUNTER — APPOINTMENT (OUTPATIENT)
Dept: GENERAL RADIOLOGY | Age: 31
End: 2021-02-09
Attending: EMERGENCY MEDICINE
Payer: MEDICAID

## 2021-02-09 ENCOUNTER — NURSE TRIAGE (OUTPATIENT)
Dept: OTHER | Facility: CLINIC | Age: 31
End: 2021-02-09

## 2021-02-09 VITALS
SYSTOLIC BLOOD PRESSURE: 129 MMHG | RESPIRATION RATE: 16 BRPM | HEART RATE: 79 BPM | HEIGHT: 66 IN | TEMPERATURE: 98 F | BODY MASS INDEX: 29.9 KG/M2 | WEIGHT: 186.07 LBS | DIASTOLIC BLOOD PRESSURE: 88 MMHG | OXYGEN SATURATION: 97 %

## 2021-02-09 DIAGNOSIS — Z20.822 SUSPECTED COVID-19 VIRUS INFECTION: Primary | ICD-10-CM

## 2021-02-09 LAB
ATRIAL RATE: 71 BPM
CALCULATED P AXIS, ECG09: 60 DEGREES
CALCULATED R AXIS, ECG10: 43 DEGREES
CALCULATED T AXIS, ECG11: 36 DEGREES
DIAGNOSIS, 93000: NORMAL
P-R INTERVAL, ECG05: 154 MS
Q-T INTERVAL, ECG07: 402 MS
QRS DURATION, ECG06: 68 MS
QTC CALCULATION (BEZET), ECG08: 436 MS
SARS-COV-2, COV2: NORMAL
VENTRICULAR RATE, ECG03: 71 BPM

## 2021-02-09 PROCEDURE — 71045 X-RAY EXAM CHEST 1 VIEW: CPT

## 2021-02-09 PROCEDURE — U0003 INFECTIOUS AGENT DETECTION BY NUCLEIC ACID (DNA OR RNA); SEVERE ACUTE RESPIRATORY SYNDROME CORONAVIRUS 2 (SARS-COV-2) (CORONAVIRUS DISEASE [COVID-19]), AMPLIFIED PROBE TECHNIQUE, MAKING USE OF HIGH THROUGHPUT TECHNOLOGIES AS DESCRIBED BY CMS-2020-01-R: HCPCS

## 2021-02-09 PROCEDURE — U0005 INFEC AGEN DETEC AMPLI PROBE: HCPCS

## 2021-02-09 PROCEDURE — 99283 EMERGENCY DEPT VISIT LOW MDM: CPT

## 2021-02-09 PROCEDURE — 93005 ELECTROCARDIOGRAM TRACING: CPT

## 2021-02-09 RX ORDER — PREDNISONE 20 MG/1
60 TABLET ORAL DAILY
Qty: 15 TAB | Refills: 0 | Status: SHIPPED | OUTPATIENT
Start: 2021-02-09 | End: 2021-02-14

## 2021-02-09 RX ORDER — ALBUTEROL SULFATE 90 UG/1
2 AEROSOL, METERED RESPIRATORY (INHALATION)
Qty: 1 INHALER | Refills: 0 | Status: SHIPPED | OUTPATIENT
Start: 2021-02-09

## 2021-02-09 NOTE — TELEPHONE ENCOUNTER
Reason for Disposition   SEVERE or constant chest pain or pressure (Exception: mild central chest pain, present only when coughing)    Answer Assessment - Initial Assessment Questions  1. COVID-19 DIAGNOSIS: \"Who made your Coronavirus (COVID-19) diagnosis? \" \"Was it confirmed by a positive lab test?\" If not diagnosed by a HCP, ask \"Are there lots of cases (community spread) where you live? \" (See public health department website, if unsure)      No diagnosis yet    2. COVID-19 EXPOSURE: \"Was there any known exposure to COVID before the symptoms began? \" CDC Definition of close contact: within 6 feet (2 meters) for a total of 15 minutes or more over a 24-hour period. No known exposure. I work in a dental office. 3. ONSET: \"When did the COVID-19 symptoms start? \"       Late Sunday night    4. WORST SYMPTOM: \"What is your worst symptom? \" (e.g., cough, fever, shortness of breath, muscle aches)      Chest pain and difficulty breathing    5. COUGH: \"Do you have a cough? \" If so, ask: \"How bad is the cough? \"        Yes,  Dry cough it's pretty bad when I start. 6. FEVER: \"Do you have a fever? \" If so, ask: \"What is your temperature, how was it measured, and when did it start? \"      No    7. RESPIRATORY STATUS: \"Describe your breathing? \" (e.g., shortness of breath, wheezing, unable to speak)       Shortness of breath    8. BETTER-SAME-WORSE: Jewels Abner you getting better, staying the same or getting worse compared to yesterday? \"  If getting worse, ask, \"In what way? \"      Worse today. Yesterday I had some energy, today I have none. 9. HIGH RISK DISEASE: \"Do you have any chronic medical problems? \" (e.g., asthma, heart or lung disease, weak immune system, obesity, etc.)      No    10. PREGNANCY: \"Is there any chance you are pregnant? \" \"When was your last menstrual period? \"        I don't think so  LMP- last month    11. OTHER SYMPTOMS: \"Do you have any other symptoms? \"  (e.g., chills, fatigue, headache, loss of smell or taste, muscle pain, sore throat; new loss of smell or taste especially support the diagnosis of COVID-19)        Headache, runny nose, body aches, chest pain, some nausea, left neck pain    Protocols used: CORONAVIRUS (COVID-19) DIAGNOSED OR SUSPECTED-ADULT-AH    Patient called [facility] with red flag complaint. Call received from [facility] description of triage: chest pain/pressure, difficulty breathing, nausea, left sided neck pain, dry cough, headache, body aches    Triage indicates for patient to ED now    Care advice provided, patient verbalizes understanding; denies any other questions or concerns; instructed to call back for any new or worsening symptoms. Attention Provider: Thank you for allowing me to participate in the care of your patient. The patient was connected to triage in response to information from calling the [facility]. Please do not respond through this encounter as the response is not directed to a shared pool.     R

## 2021-02-09 NOTE — Clinical Note
[address] 
[facility] EMERGENCY DEPT 
[address] [facility] [place] [id number] [phone] Work/School Note Date: 2/9/2021 To Whom It May concern: 
 
Jay Beck was evaulated by the following provider(s): 
Attending Provider: Benito Blanco, [address] [place] [address] virus is suspected. Per the CDC guidelines we recommend home isolation until the following conditions are all met: 1. At least 10 days have passed since symptoms first appeared and 2. At least 24 hours have passed since last fever without the use of fever-reducing medications and 
3. Symptoms (e.g., cough, shortness of breath) have improved Sincerely, Philippe Bell MD

## 2021-02-09 NOTE — ED NOTES
Lakisha RICE reviewed discharge instructions with the patient. The patient verbalized understanding. All questions and concerns were addressed. The patient declined a wheelchair and is discharged ambulatory in the care of family members with instructions and prescriptions in hand. Pt is alert and oriented x 4.

## 2021-02-09 NOTE — ED PROVIDER NOTES
EMERGENCY DEPARTMENT HISTORY AND PHYSICAL EXAM      Date: 2/9/2021  Patient Name: Ramon oMrin    History of Presenting Illness     Chief Complaint   Patient presents with    Chest Pain     \" I work in a dental office\" woke up with dry cough hurts to breath left side of neck an chest hurts; has no appetite; sore throat     Concern For COVID-19 (Coronavirus)       History Provided By: Patient    HPI: Ramon Morin, 26 y.o. female with a past medical history significant for medical problems as stated below presents to the ED with cc of moderate severe dry cough, difficulty breathing, pleuritic chest pain over the last several days. She also reports sore throat and general myalgias and fatigue. She reports that she is concerned about coronavirus even though she has not had any direct contacts that she knows of. She denies any discrete fevers, vomiting, diarrhea, dysuria, rash, or any other associated symptoms. No other exacerbating ameliorating factors. There are no other complaints, changes, or physical findings at this time. PCP: Lolis Epps NP    No current facility-administered medications on file prior to encounter. Current Outpatient Medications on File Prior to Encounter   Medication Sig Dispense Refill    butalb/acetaminophen/caffeine (FIORICET PO) Take  by mouth.  SUMAtriptan (Imitrex) 100 mg tablet Take 100 mg by mouth once as needed for Migraine.  rizatriptan (MAXALT) 10 mg tablet TAKE 1 TABLET BY MOUTH ONCE AS NEEDED FOR MIGRAINE FOR UP TO 1 DOSE. MAY REPEAT IN 2 HOURS IF NEEDED.  9 Tab 3       Past History     Past Medical History:  Past Medical History:   Diagnosis Date    Abnormal Papanicolaou smear of cervix     Headache     Hypertension     Lumbar degenerative disc disease 2018    Other ill-defined conditions(799.89)     kidney infections    Other ill-defined conditions(799.89)     Migraines    Ovarian cyst     Postpartum depression     Ruptured ovarian cyst 2018       Past Surgical History:  Past Surgical History:   Procedure Laterality Date    HX APPENDECTOMY  01/04/2020    Laparoscopic appendectomy, enterolysis & excision right fallopian tube cyst by Dr Amanda Ashby    HX ORTHOPAEDIC      pelvic bone fx       Family History:  Family History   Problem Relation Age of Onset    No Known Problems Mother     No Known Problems Father     No Known Problems Brother        Social History:  Social History     Tobacco Use    Smoking status: Former Smoker    Smokeless tobacco: Never Used    Tobacco comment: Very rarely   Substance Use Topics    Alcohol use: No    Drug use: No       Allergies: Allergies   Allergen Reactions    Ciprofloxacin Hives    Pcn [Penicillins] Hives    Percocet [Oxycodone-Acetaminophen] Other (comments)     Becomes sweaty and passes out         Review of Systems   Review of Systems   Constitutional: Negative for chills, diaphoresis, fatigue and fever. HENT: Negative for ear pain and sore throat. Eyes: Negative for pain and redness. Respiratory: Positive for cough and shortness of breath. Cardiovascular: Positive for chest pain. Negative for leg swelling. Gastrointestinal: Negative for abdominal pain, diarrhea, nausea and vomiting. Endocrine: Negative for cold intolerance and heat intolerance. Genitourinary: Negative for flank pain and hematuria. Musculoskeletal: Negative for back pain and neck stiffness. Skin: Negative for rash and wound. Neurological: Negative for dizziness, syncope and headaches. All other systems reviewed and are negative. Physical Exam   Physical Exam  Vitals signs and nursing note reviewed. Constitutional:       Appearance: She is well-developed. HENT:      Head: Normocephalic and atraumatic. Mouth/Throat:      Pharynx: No oropharyngeal exudate. Eyes:      Conjunctiva/sclera: Conjunctivae normal.      Pupils: Pupils are equal, round, and reactive to light.    Neck: Musculoskeletal: Normal range of motion. Cardiovascular:      Rate and Rhythm: Normal rate and regular rhythm. Heart sounds: No murmur. Pulmonary:      Effort: Pulmonary effort is normal. No respiratory distress. Breath sounds: Normal breath sounds. No wheezing. Abdominal:      General: Bowel sounds are normal. There is no distension. Palpations: Abdomen is soft. Tenderness: There is no abdominal tenderness. Musculoskeletal: Normal range of motion. General: No deformity. Skin:     General: Skin is warm and dry. Findings: No rash. Neurological:      Mental Status: She is alert and oriented to person, place, and time. Coordination: Coordination normal.   Psychiatric:         Behavior: Behavior normal.         Diagnostic Study Results     Labs -     Recent Results (from the past 24 hour(s))   EKG, 12 LEAD, INITIAL    Collection Time: 02/09/21  9:46 AM   Result Value Ref Range    Ventricular Rate 71 BPM    Atrial Rate 71 BPM    P-R Interval 154 ms    QRS Duration 68 ms    Q-T Interval 402 ms    QTC Calculation (Bezet) 436 ms    Calculated P Axis 60 degrees    Calculated R Axis 43 degrees    Calculated T Axis 36 degrees    Diagnosis       Normal sinus rhythm  Normal ECG  No previous ECGs available     SARS-COV-2    Collection Time: 02/09/21 10:11 AM   Result Value Ref Range    SARS-CoV-2 Please find results under separate order         Radiologic Studies -   XR CHEST PORT   Final Result   No acute abnormality              CT Results  (Last 48 hours)    None        CXR Results  (Last 48 hours)               02/09/21 1004  XR CHEST PORT Final result    Impression:  No acute abnormality               Narrative:  EXAM:  XR CHEST PORT       INDICATION:  cough, shortness of breath, chest pain       COMPARISON:  2014       FINDINGS: A portable AP radiograph of the chest was obtained at 955 hours. .    The lungs are clear.   The cardiac and mediastinal contours and pulmonary vascularity are normal.  The bones and soft tissues are grossly within normal   limits. Medical Decision Making   I am the first provider for this patient. I reviewed the vital signs, available nursing notes, past medical history, past surgical history, family history and social history. Vital Signs-Reviewed the patient's vital signs. Patient Vitals for the past 12 hrs:   Temp Pulse Resp BP SpO2   02/09/21 1100     100 %   02/09/21 1030     100 %   02/09/21 1015     98 %   02/09/21 0937 98 °F (36.7 °C) 79 16 129/88 98 %       Records Reviewed: Nursing records and medical records reviewed    MDM:  Patient presents with CP. DDx:  ACS, Aortic dissection, PNA, PE, PTX, pericarditis, myocarditis, GERD, costochondritis, anxiety. Provider Notes (Medical Decision Making):   Patient is a 41-year-old female presenting with symptoms consistent with upper respiratory infection. X-rays negative for pneumonia and her EKG shows no evidence of acute coronary ischemia. I do not feel a comprehensive evaluation for acute coronary syndrome is indicated. Patient is PERC negative for PE so D-dimer was not ordered. I will treat her symptomatically and tested for Covid and have her follow-up with her primary care doctor within the next week. ED Course:   Initial assessment performed. The patients presenting problems have been discussed, and they are in agreement with the care plan formulated and outlined with them. I have encouraged them to ask questions as they arise throughout their visit. ED Course as of Feb 09 1150   Tue Feb 09, 2021   0955 EKG interpretation: (Preliminary)  Rhythm: normal sinus rhythm; and regular . Rate (approx.): 71;  Axis: normal; P wave: normal; QRS interval: normal ; ST/T wave: normal;       [CC]      ED Course User Index  [CC] Benito Blanco MD               Critical Care:  None      Disposition:  11:57 AM  Patrica Mojica's  results have been reviewed with her. She has been counseled regarding her diagnosis. She verbally conveys understanding and agreement of the signs, symptoms, diagnosis, treatment and prognosis and additionally agrees to follow up as recommended with Dr. Yovany Galicia NP in 24 - 48 hours. She also agrees with the care-plan and conveys that all of her questions have been answered. I have also put together some discharge instructions for her that include: 1) educational information regarding their diagnosis, 2) how to care for their diagnosis at home, as well a 3) list of reasons why they would want to return to the ED prior to their follow-up appointment, should their condition change. DISCHARGE PLAN:  1. Current Discharge Medication List      START taking these medications    Details   albuterol (ProAir HFA) 90 mcg/actuation inhaler Take 2 Puffs by inhalation every four (4) hours as needed for Wheezing. Qty: 1 Inhaler, Refills: 0      predniSONE (DELTASONE) 20 mg tablet Take 60 mg by mouth daily for 5 days. Qty: 15 Tab, Refills: 0           2. Follow-up Information     Follow up With Specialties Details Why Contact Martha Galicia NP Nurse Practitioner In 1 week For a follow-up evaluation. [facility] [address]  [phone]      [facility] EMERGENCY DEPT Emergency Medicine In 1 day If symptoms worsen [address]  [address]  [phone]        3. Return to ED if worse     Diagnosis     Clinical Impression:   1. Suspected COVID-19 virus infection        Attestations:    Tala Castaneda MD    Please note that this dictation was completed with [employer], the computer voice recognition software. Quite often unanticipated grammatical, syntax, homophones, and other interpretive errors are inadvertently transcribed by the computer software. Please disregard these errors. Please excuse any errors that have escaped final proofreading. Thank you.

## 2021-02-10 ENCOUNTER — TELEPHONE (OUTPATIENT)
Dept: CASE MANAGEMENT | Age: 31
End: 2021-02-10

## 2021-02-10 ENCOUNTER — PATIENT MESSAGE (OUTPATIENT)
Dept: CASE MANAGEMENT | Age: 31
End: 2021-02-10

## 2021-02-10 LAB
SARS-COV-2, XPLCVT: NOT DETECTED
SOURCE, COVRS: NORMAL

## 2021-02-10 NOTE — TELEPHONE ENCOUNTER
2/10/21 12:57 PM     Patient contacted regarding COVID-19 no known risk factors. Discussed COVID-19 related testing which was pending at this time. Test results were pending. Patient informed of results, if available? N/A     Care Transition Nurse/ Ambulatory Care Manager contacted the patient by telephone to perform post discharge assessment. Call within 2 business days of discharge: Yes Verified name and  with patient as identifiers. Provided introduction to self, and explanation of the CTN/ACM role, and reason for call due to risk factors for infection and/or exposure to COVID-19. Symptoms reviewed with patient who verbalized the following symptoms: fatigue, pain in left neck and headaches but she states she has hx of migraine HAs and can't tell that these are any worse, chest pain, cough, SOB, N/V/D--but states vomiting has occurred with coughing only, cold skin, and no worsening symptoms, but states she is not feeling any better yet. Due to no new or worsening symptoms encounter was not routed to provider for escalation. Discussed follow-up appointments. If no appointment was previously scheduled, appointment scheduling offered:  no   [address] [place] [address]  follow up appointment(s): No future appointments. Non-[facility] follow up appointment(s): none     Advance Care Planning:   Does patient have an Advance Directive:  not on file; education provided. Offered to send a copy of the blank [facility] AMD form to her [facility]. Primary Decision Maker: Walt Mcneil - Boyfriend - [phone]    Secondary Decision Maker: Loco Washington - Mother - [phone]    Patient has following risk factors of: no known risk factors. CTN/ACM reviewed discharge instructions, medical action plan and red flags such as increased shortness of breath, increasing fever and signs of decompensation with patient who verbalized understanding.    Did not discuss exposure protocols and quarantine with CDC Guidelines What to do if you are sick with coronavirus disease 2019\" because patient was given an opportunity for questions and concerns, and denies having any at this time. The patient agrees to contact the PCP office for questions related to their healthcare. CTN/ACM provided contact information for future needs. Advised that since pt has an active [employer] account, I will send our COVID-19 information and resource phone #s to her via the pt portal for reference as needed. Reviewed and educated patient on any new and changed medications related to discharge diagnosis. Pt reports that she obtained the albuterol inhaler and prednisone prescribed in the ED visit and will call her PCP as soon as she finds out her COVID test result to arrange F/U appt. Patient/family/caregiver given information for [employer] and agrees to enroll no, declined  Patient's preferred e-mail: N/A   Patient's preferred phone number: N/A  Based on Loop alert triggers, patient will be contacted by nurse care manager for worsening symptoms. Plan for follow-up call in 14 days based on severity of symptoms and risk factors.

## 2021-02-24 ENCOUNTER — TELEPHONE (OUTPATIENT)
Dept: CASE MANAGEMENT | Age: 31
End: 2021-02-24

## 2021-02-24 NOTE — TELEPHONE ENCOUNTER
2/24/21 11:57 AM     Patient resolved from Transition of Care episode on 2/24/21. ACM/CTN was unsuccessful at contacting this patient today. Patient/family was provided the following resources and education related to COVID-19 during the initial call:                         Signs, symptoms and red flags related to COVID-19            CDC exposure and quarantine guidelines            [employer] exposure contact - [phone]            Contact for their local Department of Health                 Patient has not had any additional ED or hospital visits. No further outreach scheduled with this CTN/ACM. Episode of Care resolved. Patient has this CTN/ACM contact information if future needs arise.

## 2021-05-10 NOTE — LETTER
----- Message from ELIZABETH Monroy sent at 5/10/2021  9:11 AM EDT -----  Notify patient that her urine culture did return positive for UTI. I will send over antibiotics to pharmacy.    NOTIFICATION RETURN TO WORK / SCHOOL 
 
2/19/2019 12:01 PM 
 
Ms. Kavon Johnson 67 [facility] To Whom It May Concern: 
 
Kavon Johnson is currently under the care of Florian Kelly. She will return to work/school on: 02/20/2019 If there are questions or concerns please have the patient contact our office. Sincerely, Sravan Apodaca NP

## 2021-09-18 ENCOUNTER — OFFICE VISIT (OUTPATIENT)
Dept: URGENT CARE | Age: 31
End: 2021-09-18
Payer: MEDICAID

## 2021-09-18 VITALS — RESPIRATION RATE: 16 BRPM | OXYGEN SATURATION: 97 % | HEART RATE: 78 BPM | TEMPERATURE: 97.8 F

## 2021-09-18 DIAGNOSIS — J01.91 ACUTE RECURRENT SINUSITIS, UNSPECIFIED LOCATION: Primary | ICD-10-CM

## 2021-09-18 PROCEDURE — 99213 OFFICE O/P EST LOW 20 MIN: CPT | Performed by: NURSE PRACTITIONER

## 2021-09-18 RX ORDER — METHYLPREDNISOLONE 4 MG/1
TABLET ORAL
Qty: 1 DOSE PACK | Refills: 0 | Status: SHIPPED | OUTPATIENT
Start: 2021-09-18

## 2021-09-18 RX ORDER — TOPIRAMATE 50 MG/1
TABLET, FILM COATED ORAL
COMMUNITY

## 2021-09-18 NOTE — PROGRESS NOTES
This patient was seen at [address] [place] [address] [facility] while in their vehicle due to COVID-19 pandemic with PPE and focused examination in order to decrease community viral transmission. The patient/guardian gave verbal consent to treat. Hue Marroquin is a 33 y.o. female who presents for evaluation of sinus pressure x 1 days. Reports started with sinus pressure, sore throat, headache and nasal congestion yesterday. Tried OTC antihistamine without improvement. Denies any symptoms such as cough, SOB, chest tightness, n/v/d, fever etc. No known exposure to COVID or sick contacts. No other complaints or concerns at this time. PMH: Seasonal allergies, HTN. Non-smoker.              Past Medical History:   Diagnosis Date    Abnormal Papanicolaou smear of cervix     Headache     Hypertension     Lumbar degenerative disc disease 2018    Other ill-defined conditions(799.89)     kidney infections    Other ill-defined conditions(799.89)     Migraines    Ovarian cyst     Postpartum depression     Ruptured ovarian cyst 2018        Past Surgical History:   Procedure Laterality Date    HX APPENDECTOMY  01/04/2020    Laparoscopic appendectomy, enterolysis & excision right fallopian tube cyst by Dr Amy Brown    HX ORTHOPAEDIC      pelvic bone fx         Family History   Problem Relation Age of Onset    No Known Problems Mother     No Known Problems Father     No Known Problems Brother         Social History     Socioeconomic History    Marital status: SINGLE     Spouse name: Not on file    Number of children: Not on file    Years of education: Not on file    Highest education level: Not on file   Occupational History    Not on file   Tobacco Use    Smoking status: Former Smoker    Smokeless tobacco: Never Used    Tobacco comment: Very rarely   Substance and Sexual Activity    Alcohol use: No    Drug use: No    Sexual activity: Yes     Birth control/protection: Injection   Other Topics Concern     Service Not Asked    Blood Transfusions Not Asked    Caffeine Concern Not Asked    Occupational Exposure Not Asked    Hobby Hazards Not Asked    Sleep Concern Not Asked    Stress Concern Not Asked    Weight Concern Not Asked    Special Diet Not Asked    Back Care Not Asked    Exercise Not Asked    Bike Helmet Not Asked   [address] Not Asked    Self-Exams Not Asked   Social History Narrative    Not on file     Social Determinants of Health     Financial Resource Strain:     Difficulty of Paying Living Expenses:    Food Insecurity:     Worried About Running Out of Food in the Last Year:     [address] in the Last Year:    Transportation Needs:     Lack of Transportation (Medical):  Lack of Transportation (Non-Medical):    Physical Activity:     Days of Exercise per Week:     Minutes of Exercise per Session:    Stress:     Feeling of Stress :    Social Connections:     Frequency of Communication with Friends and Family:     Frequency of Social Gatherings with Friends and Family:     Attends Faith Services:     Active Member of Clubs or Organizations:     Attends Club or Organization Meetings:     Marital Status:    Intimate Partner Violence:     Fear of Current or Ex-Partner:     Emotionally Abused:     Physically Abused:     Sexually Abused: ALLERGIES: Ciprofloxacin, Pcn [penicillins], and Percocet [oxycodone-acetaminophen]    Review of Systems   Constitutional: Positive for fatigue. Negative for activity change, appetite change, chills, diaphoresis and fever. HENT: Positive for congestion, sinus pressure, sinus pain and sore throat. Negative for ear pain and rhinorrhea. Respiratory: Negative for cough, chest tightness, shortness of breath and wheezing. Cardiovascular: Negative for chest pain. Gastrointestinal: Negative for abdominal pain, constipation, diarrhea, nausea and vomiting. Musculoskeletal: Negative for myalgias.    Skin: Negative for rash. Neurological: Positive for headaches. Negative for dizziness, weakness and light-headedness. Vitals:    09/18/21 1258   Pulse: 78   Resp: 16   Temp: 97.8 °F (36.6 °C)   SpO2: 97%       Physical Exam  Vitals and nursing note reviewed. Constitutional:       General: She is not in acute distress. Appearance: Normal appearance. She is not ill-appearing. HENT:      Head: Normocephalic and atraumatic. Right Ear: Tympanic membrane and ear canal normal.      Left Ear: Tympanic membrane and ear canal normal.      Nose: Congestion and rhinorrhea present. Right Sinus: Maxillary sinus tenderness and frontal sinus tenderness present. Left Sinus: Maxillary sinus tenderness and frontal sinus tenderness present. Mouth/Throat:      Mouth: Mucous membranes are moist.      Pharynx: Oropharynx is clear. No pharyngeal swelling, oropharyngeal exudate or posterior oropharyngeal erythema. Eyes:      Conjunctiva/sclera: Conjunctivae normal.      Pupils: Pupils are equal, round, and reactive to light. Cardiovascular:      Rate and Rhythm: Normal rate and regular rhythm. Heart sounds: Normal heart sounds. No murmur heard. Pulmonary:      Effort: Pulmonary effort is normal.      Breath sounds: Normal breath sounds. No wheezing or rhonchi. Musculoskeletal:         General: Normal range of motion. Cervical back: Normal range of motion and neck supple. No muscular tenderness. Lymphadenopathy:      Cervical: No cervical adenopathy. Skin:     General: Skin is warm and dry. Findings: No rash. Neurological:      Mental Status: She is alert and oriented to person, place, and time. Psychiatric:         Mood and Affect: Mood normal.         Behavior: Behavior normal.         MDM    Procedures      ICD-10-CM ICD-9-CM   1.  Acute recurrent sinusitis, unspecified location  J01.91 461.9       Orders Placed This Encounter    methylPREDNISolone (MEDROL DOSEPACK) 4 mg tablet     Sig: Complete as directed     Dispense:  1 Dose Pack     Refill:  0      Discussed presentation with patient and treatment recommendations. Advised on viral vs bacterial illness and following treatment plan developed. Will start on medrol dose pack to decrease inflammation- discouraged NSAIDs during use. Advised on mech of action and potential side effects of medications. Encouraged to push fluids, tylenol as needed for pain, warm salt water gargles, OTC mucinex etc.   Can try OTC antihistamine after antibiotic completion to help prevent allergy symptom control- allow 1-2 weeks for symptom improvement. Patient denies COVID testing at this time. The patient is to follow up with PCP PRN. If signs and symptoms become worse the pt is to go to the ER.      Signed By: Donald Klein NP     September 18, 2021

## 2022-03-18 PROBLEM — G47.9 SLEEP DISTURBANCE: Status: ACTIVE | Noted: 2018-01-30

## 2022-03-18 PROBLEM — M54.2 CERVICALGIA: Status: ACTIVE | Noted: 2018-06-07

## 2022-03-18 PROBLEM — Z34.90 PREGNANCY: Status: ACTIVE | Noted: 2020-05-19

## 2022-03-18 PROBLEM — M54.16 LUMBAR RADICULOPATHY: Status: ACTIVE | Noted: 2018-08-03

## 2022-03-19 PROBLEM — K35.80 ACUTE APPENDICITIS COMPLICATING PREGNANCY: Status: ACTIVE | Noted: 2020-01-04

## 2022-03-19 PROBLEM — M54.16 LUMBAR BACK PAIN WITH RADICULOPATHY AFFECTING LEFT LOWER EXTREMITY: Status: ACTIVE | Noted: 2018-05-22

## 2022-03-19 PROBLEM — M51.36 LUMBAR DEGENERATIVE DISC DISEASE: Status: ACTIVE | Noted: 2018-08-03

## 2022-03-19 PROBLEM — Q38.6 LONG UVULA: Status: ACTIVE | Noted: 2018-01-15

## 2022-03-19 PROBLEM — M51.369 LUMBAR DEGENERATIVE DISC DISEASE: Status: ACTIVE | Noted: 2018-08-03

## 2022-03-19 PROBLEM — O99.619 ACUTE APPENDICITIS COMPLICATING PREGNANCY: Status: ACTIVE | Noted: 2020-01-04

## 2022-03-19 PROBLEM — Q38.5 LONG UVULA: Status: ACTIVE | Noted: 2018-01-15

## 2022-03-20 PROBLEM — G43.011 INTRACTABLE MIGRAINE WITHOUT AURA AND WITH STATUS MIGRAINOSUS: Status: ACTIVE | Noted: 2018-02-05

## 2023-05-30 RX ORDER — SUMATRIPTAN 100 MG/1
TABLET, FILM COATED ORAL
COMMUNITY

## 2023-05-30 RX ORDER — ALBUTEROL SULFATE 90 UG/1
2 AEROSOL, METERED RESPIRATORY (INHALATION) EVERY 4 HOURS PRN
COMMUNITY
Start: 2021-02-09

## 2023-05-30 RX ORDER — METHYLPREDNISOLONE 4 MG/1
TABLET ORAL
COMMUNITY
Start: 2021-09-18

## 2023-05-30 RX ORDER — TOPIRAMATE 50 MG/1
TABLET, FILM COATED ORAL
COMMUNITY

## 2023-09-24 ENCOUNTER — APPOINTMENT (OUTPATIENT)
Facility: HOSPITAL | Age: 33
End: 2023-09-24
Payer: MEDICAID

## 2023-09-24 ENCOUNTER — HOSPITAL ENCOUNTER (EMERGENCY)
Facility: HOSPITAL | Age: 33
Discharge: HOME | End: 2023-09-24
Attending: EMERGENCY MEDICINE
Payer: MEDICAID

## 2023-09-24 VITALS
DIASTOLIC BLOOD PRESSURE: 72 MMHG | SYSTOLIC BLOOD PRESSURE: 114 MMHG | WEIGHT: 200.4 LBS | RESPIRATION RATE: 23 BRPM | OXYGEN SATURATION: 99 % | TEMPERATURE: 97.5 F | HEART RATE: 67 BPM

## 2023-09-24 DIAGNOSIS — R20.2 PARESTHESIAS: ICD-10-CM

## 2023-09-24 DIAGNOSIS — R51.9 ACUTE INTRACTABLE HEADACHE, UNSPECIFIED HEADACHE TYPE: Primary | ICD-10-CM

## 2023-09-24 LAB
ANION GAP SERPL CALC-SCNC: 6 MMOL/L (ref 5–15)
BASOPHILS # BLD: 0 K/UL (ref 0–0.1)
BASOPHILS NFR BLD: 0 % (ref 0–1)
BUN SERPL-MCNC: 10 MG/DL (ref 6–20)
BUN/CREAT SERPL: 12 (ref 12–20)
CALCIUM SERPL-MCNC: 8.6 MG/DL (ref 8.5–10.1)
CHLORIDE SERPL-SCNC: 112 MMOL/L (ref 97–108)
CO2 SERPL-SCNC: 22 MMOL/L (ref 21–32)
CREAT SERPL-MCNC: 0.86 MG/DL (ref 0.55–1.02)
DIFFERENTIAL METHOD BLD: NORMAL
EOSINOPHIL # BLD: 0.1 K/UL (ref 0–0.4)
EOSINOPHIL NFR BLD: 2 % (ref 0–7)
ERYTHROCYTE [DISTWIDTH] IN BLOOD BY AUTOMATED COUNT: 12.8 % (ref 11.5–14.5)
GLUCOSE SERPL-MCNC: 90 MG/DL (ref 65–100)
HCG UR QL: NEGATIVE
HCT VFR BLD AUTO: 38.9 % (ref 35–47)
HGB BLD-MCNC: 12.7 G/DL (ref 11.5–16)
IMM GRANULOCYTES # BLD AUTO: 0 K/UL (ref 0–0.04)
IMM GRANULOCYTES NFR BLD AUTO: 0 % (ref 0–0.5)
LYMPHOCYTES # BLD: 1.9 K/UL (ref 0.8–3.5)
LYMPHOCYTES NFR BLD: 24 % (ref 12–49)
MCH RBC QN AUTO: 30 PG (ref 26–34)
MCHC RBC AUTO-ENTMCNC: 32.6 G/DL (ref 30–36.5)
MCV RBC AUTO: 91.7 FL (ref 80–99)
MONOCYTES # BLD: 0.4 K/UL (ref 0–1)
MONOCYTES NFR BLD: 5 % (ref 5–13)
NEUTS SEG # BLD: 5.2 K/UL (ref 1.8–8)
NEUTS SEG NFR BLD: 69 % (ref 32–75)
NRBC # BLD: 0 K/UL (ref 0–0.01)
NRBC BLD-RTO: 0 PER 100 WBC
PLATELET # BLD AUTO: 260 K/UL (ref 150–400)
PMV BLD AUTO: 10.8 FL (ref 8.9–12.9)
POTASSIUM SERPL-SCNC: 3.6 MMOL/L (ref 3.5–5.1)
RBC # BLD AUTO: 4.24 M/UL (ref 3.8–5.2)
SODIUM SERPL-SCNC: 140 MMOL/L (ref 136–145)
TROPONIN I SERPL HS-MCNC: 4 NG/L (ref 0–51)
WBC # BLD AUTO: 7.7 K/UL (ref 3.6–11)

## 2023-09-24 PROCEDURE — 36415 COLL VENOUS BLD VENIPUNCTURE: CPT

## 2023-09-24 PROCEDURE — 70450 CT HEAD/BRAIN W/O DYE: CPT

## 2023-09-24 PROCEDURE — 6360000004 HC RX CONTRAST MEDICATION: Performed by: RADIOLOGY

## 2023-09-24 PROCEDURE — 80048 BASIC METABOLIC PNL TOTAL CA: CPT

## 2023-09-24 PROCEDURE — 84484 ASSAY OF TROPONIN QUANT: CPT

## 2023-09-24 PROCEDURE — 6360000002 HC RX W HCPCS: Performed by: EMERGENCY MEDICINE

## 2023-09-24 PROCEDURE — 81025 URINE PREGNANCY TEST: CPT

## 2023-09-24 PROCEDURE — 70498 CT ANGIOGRAPHY NECK: CPT

## 2023-09-24 PROCEDURE — 96375 TX/PRO/DX INJ NEW DRUG ADDON: CPT

## 2023-09-24 PROCEDURE — 96361 HYDRATE IV INFUSION ADD-ON: CPT

## 2023-09-24 PROCEDURE — 99285 EMERGENCY DEPT VISIT HI MDM: CPT

## 2023-09-24 PROCEDURE — 96374 THER/PROPH/DIAG INJ IV PUSH: CPT

## 2023-09-24 PROCEDURE — 85025 COMPLETE CBC W/AUTO DIFF WBC: CPT

## 2023-09-24 PROCEDURE — 2580000003 HC RX 258: Performed by: EMERGENCY MEDICINE

## 2023-09-24 RX ORDER — KETOROLAC TROMETHAMINE 30 MG/ML
30 INJECTION, SOLUTION INTRAMUSCULAR; INTRAVENOUS
Status: COMPLETED | OUTPATIENT
Start: 2023-09-24 | End: 2023-09-24

## 2023-09-24 RX ORDER — DIPHENHYDRAMINE HYDROCHLORIDE 50 MG/ML
25 INJECTION INTRAMUSCULAR; INTRAVENOUS
Status: COMPLETED | OUTPATIENT
Start: 2023-09-24 | End: 2023-09-24

## 2023-09-24 RX ORDER — SODIUM CHLORIDE, SODIUM LACTATE, POTASSIUM CHLORIDE, AND CALCIUM CHLORIDE .6; .31; .03; .02 G/100ML; G/100ML; G/100ML; G/100ML
1000 INJECTION, SOLUTION INTRAVENOUS
Status: COMPLETED | OUTPATIENT
Start: 2023-09-24 | End: 2023-09-24

## 2023-09-24 RX ORDER — ONDANSETRON 2 MG/ML
4 INJECTION INTRAMUSCULAR; INTRAVENOUS ONCE
Status: COMPLETED | OUTPATIENT
Start: 2023-09-24 | End: 2023-09-24

## 2023-09-24 RX ORDER — MORPHINE SULFATE 4 MG/ML
4 INJECTION, SOLUTION INTRAMUSCULAR; INTRAVENOUS
Status: DISCONTINUED | OUTPATIENT
Start: 2023-09-24 | End: 2023-09-24 | Stop reason: HOSPADM

## 2023-09-24 RX ORDER — PROCHLORPERAZINE EDISYLATE 5 MG/ML
5 INJECTION INTRAMUSCULAR; INTRAVENOUS ONCE
Status: COMPLETED | OUTPATIENT
Start: 2023-09-24 | End: 2023-09-24

## 2023-09-24 RX ADMIN — ONDANSETRON 4 MG: 2 INJECTION INTRAMUSCULAR; INTRAVENOUS at 15:51

## 2023-09-24 RX ADMIN — PROCHLORPERAZINE EDISYLATE 5 MG: 5 INJECTION INTRAMUSCULAR; INTRAVENOUS at 17:28

## 2023-09-24 RX ADMIN — DIPHENHYDRAMINE HYDROCHLORIDE 25 MG: 50 INJECTION, SOLUTION INTRAMUSCULAR; INTRAVENOUS at 17:28

## 2023-09-24 RX ADMIN — KETOROLAC TROMETHAMINE 30 MG: 30 INJECTION, SOLUTION INTRAMUSCULAR; INTRAVENOUS at 17:28

## 2023-09-24 RX ADMIN — IOPAMIDOL 100 ML: 755 INJECTION, SOLUTION INTRAVENOUS at 17:07

## 2023-09-24 RX ADMIN — SODIUM CHLORIDE, POTASSIUM CHLORIDE, SODIUM LACTATE AND CALCIUM CHLORIDE 1000 ML: 600; 310; 30; 20 INJECTION, SOLUTION INTRAVENOUS at 15:51

## 2023-09-24 ASSESSMENT — PAIN SCALES - GENERAL: PAINLEVEL_OUTOF10: 10

## 2023-09-24 NOTE — ED NOTES
This rn at bedside for dc, went over all papers with pt at time of dc. Pt verbalized understanding. Pt was ambulatory at time of dc. Iv removed tip intact.       Silvestre Low RN  09/24/23 Isac Younger

## 2023-09-24 NOTE — DISCHARGE INSTRUCTIONS
You were seen in the emergency department for your symptoms. Please follow-up with your primary care doctor and the neurologist.  Please return for new or worsening symptoms anytime.

## 2023-09-25 LAB
EKG ATRIAL RATE: 64 BPM
EKG DIAGNOSIS: NORMAL
EKG P AXIS: 49 DEGREES
EKG P-R INTERVAL: 150 MS
EKG Q-T INTERVAL: 414 MS
EKG QRS DURATION: 74 MS
EKG QTC CALCULATION (BAZETT): 427 MS
EKG R AXIS: 27 DEGREES
EKG T AXIS: 22 DEGREES
EKG VENTRICULAR RATE: 64 BPM

## 2023-10-23 ENCOUNTER — OFFICE VISIT (OUTPATIENT)
Age: 33
End: 2023-10-23
Payer: MEDICAID

## 2023-10-23 VITALS
RESPIRATION RATE: 18 BRPM | WEIGHT: 197.8 LBS | TEMPERATURE: 97.8 F | DIASTOLIC BLOOD PRESSURE: 67 MMHG | BODY MASS INDEX: 31.79 KG/M2 | SYSTOLIC BLOOD PRESSURE: 121 MMHG | OXYGEN SATURATION: 98 % | HEART RATE: 81 BPM | HEIGHT: 66 IN

## 2023-10-23 DIAGNOSIS — Z51.81 THERAPEUTIC DRUG MONITORING: ICD-10-CM

## 2023-10-23 DIAGNOSIS — G43.109 MIGRAINE WITH AURA AND WITHOUT STATUS MIGRAINOSUS, NOT INTRACTABLE: ICD-10-CM

## 2023-10-23 DIAGNOSIS — G43.109 COMPLICATED MIGRAINE: ICD-10-CM

## 2023-10-23 DIAGNOSIS — M54.16 LUMBAR BACK PAIN WITH RADICULOPATHY AFFECTING LEFT LOWER EXTREMITY: Primary | ICD-10-CM

## 2023-10-23 DIAGNOSIS — G44.209 TENSION VASCULAR HEADACHE: ICD-10-CM

## 2023-10-23 PROCEDURE — 99204 OFFICE O/P NEW MOD 45 MIN: CPT | Performed by: PSYCHIATRY & NEUROLOGY

## 2023-10-23 RX ORDER — BUTALBITAL, ACETAMINOPHEN AND CAFFEINE 50; 325; 40 MG/1; MG/1; MG/1
1 TABLET ORAL EVERY 4 HOURS PRN
COMMUNITY

## 2023-10-23 RX ORDER — LEVOTHYROXINE SODIUM 0.03 MG/1
25 TABLET ORAL DAILY
COMMUNITY

## 2023-10-23 RX ORDER — LANOLIN ALCOHOL/MO/W.PET/CERES
400 CREAM (GRAM) TOPICAL DAILY
COMMUNITY

## 2023-10-23 RX ORDER — TOPIRAMATE 50 MG/1
TABLET, FILM COATED ORAL
Qty: 135 TABLET | Refills: 3 | Status: SHIPPED | OUTPATIENT
Start: 2023-10-23

## 2023-10-23 RX ORDER — ASPIRIN 81 MG/1
81 TABLET ORAL DAILY
COMMUNITY

## 2023-10-23 RX ORDER — RIZATRIPTAN BENZOATE 10 MG/1
10 TABLET ORAL EVERY 12 HOURS PRN
Qty: 12 TABLET | Refills: 11 | Status: SHIPPED | OUTPATIENT
Start: 2023-10-23

## 2023-10-23 ASSESSMENT — PATIENT HEALTH QUESTIONNAIRE - PHQ9
SUM OF ALL RESPONSES TO PHQ QUESTIONS 1-9: 0
1. LITTLE INTEREST OR PLEASURE IN DOING THINGS: 0
SUM OF ALL RESPONSES TO PHQ9 QUESTIONS 1 & 2: 0
2. FEELING DOWN, DEPRESSED OR HOPELESS: 0
SUM OF ALL RESPONSES TO PHQ9 QUESTIONS 1 & 2: 0
SUM OF ALL RESPONSES TO PHQ QUESTIONS 1-9: 0
2. FEELING DOWN, DEPRESSED OR HOPELESS: 0
1. LITTLE INTEREST OR PLEASURE IN DOING THINGS: 0
SUM OF ALL RESPONSES TO PHQ QUESTIONS 1-9: 0
1. LITTLE INTEREST OR PLEASURE IN DOING THINGS: 0
2. FEELING DOWN, DEPRESSED OR HOPELESS: 0
SUM OF ALL RESPONSES TO PHQ9 QUESTIONS 1 & 2: 0

## 2023-10-23 NOTE — PROGRESS NOTES
tension probably are aggravating her headaches, and she has no other major medical problems other than kidney infections in the past, postpartum depression, high blood pressure, and low back pain and lumbar degenerative disease.   She had an ovarian cyst and a ruptured ovarian cyst and abnormal pap scan in the past.    Past Medical History:   Diagnosis Date    Abnormal Papanicolaou smear of cervix     Headache     Hypertension     Lumbar degenerative disc disease 2018    Migraine with aura and without status migrainosus, not intractable 10/23/2023    Other ill-defined conditions(799.89)     Migraines    Other ill-defined conditions(799.89)     kidney infections    Ovarian cyst     Postpartum depression     Ruptured ovarian cyst 2018      Past Surgical History:   Procedure Laterality Date    APPENDECTOMY  01/04/2020    Laparoscopic appendectomy, enterolysis & excision right fallopian tube cyst by Dr Lizzie Serrato      pelvic bone fx     Family History   Problem Relation Age of Onset    No Known Problems Brother     No Known Problems Mother     No Known Problems Father       Social History     Tobacco Use    Smoking status: Former    Smokeless tobacco: Never    Tobacco comments:     Quit smoking: Very rarely   Substance Use Topics    Alcohol use: No         Current Outpatient Medications:     levothyroxine (SYNTHROID) 25 MCG tablet, Take 1 tablet by mouth Daily, Disp: , Rfl:     butalbital-acetaminophen-caffeine (FIORICET, ESGIC) -40 MG per tablet, Take 1 tablet by mouth every 4 hours as needed for Headaches, Disp: , Rfl:     aspirin 81 MG EC tablet, Take 1 tablet by mouth daily, Disp: , Rfl:     magnesium oxide (MAG-OX) 400 (240 Mg) MG tablet, Take 1 tablet by mouth daily, Disp: , Rfl:     topiramate (TOPAMAX) 50 MG tablet, 1/2 tablet p.o. every morning and 1 tablet p.o. nightly, Disp: 135 tablet, Rfl: 3    rizatriptan (MAXALT) 10 MG tablet, Take 1 tablet by mouth every 12 hours as needed for

## 2023-12-29 ENCOUNTER — TELEPHONE (OUTPATIENT)
Age: 33
End: 2023-12-29

## 2023-12-29 NOTE — TELEPHONE ENCOUNTER
Upon receiving my chart message from pt about needing to schedule MRI. Papito stating that unfortunately we do not schedule that for her, I gave her the MRI number [phone].

## 2024-02-21 DIAGNOSIS — G44.209 TENSION VASCULAR HEADACHE: ICD-10-CM

## 2024-02-21 DIAGNOSIS — G43.109 MIGRAINE WITH AURA AND WITHOUT STATUS MIGRAINOSUS, NOT INTRACTABLE: ICD-10-CM

## 2024-02-21 DIAGNOSIS — G43.109 COMPLICATED MIGRAINE: ICD-10-CM

## 2024-02-21 RX ORDER — TOPIRAMATE 50 MG/1
TABLET, FILM COATED ORAL
Qty: 135 TABLET | Refills: 3 | Status: SHIPPED | OUTPATIENT
Start: 2024-02-21

## 2024-02-21 RX ORDER — RIZATRIPTAN BENZOATE 10 MG/1
10 TABLET ORAL EVERY 12 HOURS PRN
Qty: 12 TABLET | Refills: 11 | Status: CANCELLED | OUTPATIENT
Start: 2024-02-21

## 2024-02-21 NOTE — TELEPHONE ENCOUNTER
Last office visit 10/23/2023  Next office visit 04/15/2024  Last med refill   Yamil: 10/23/2023 - too soon for refill  Top: 10/23/2023

## 2025-05-06 DIAGNOSIS — G44.209 TENSION VASCULAR HEADACHE: ICD-10-CM

## 2025-05-06 DIAGNOSIS — G43.109 COMPLICATED MIGRAINE: ICD-10-CM

## 2025-05-06 DIAGNOSIS — G43.109 MIGRAINE WITH AURA AND WITHOUT STATUS MIGRAINOSUS, NOT INTRACTABLE: ICD-10-CM

## 2025-05-07 RX ORDER — TOPIRAMATE 50 MG/1
TABLET, FILM COATED ORAL
Qty: 45 TABLET | Refills: 0 | OUTPATIENT
Start: 2025-05-07

## 2025-05-07 NOTE — TELEPHONE ENCOUNTER
Last office visit: 10/23/2023  Next office visit: No future visit scheduled  Last med refill: 02/21/2024

## 2025-05-20 DIAGNOSIS — G43.109 COMPLICATED MIGRAINE: ICD-10-CM

## 2025-05-20 DIAGNOSIS — G43.109 MIGRAINE WITH AURA AND WITHOUT STATUS MIGRAINOSUS, NOT INTRACTABLE: ICD-10-CM

## 2025-05-20 DIAGNOSIS — G44.209 TENSION VASCULAR HEADACHE: ICD-10-CM

## 2025-05-21 RX ORDER — TOPIRAMATE 50 MG/1
TABLET, FILM COATED ORAL
Qty: 45 TABLET | Refills: 0 | OUTPATIENT
Start: 2025-05-21

## 2025-06-17 DIAGNOSIS — G44.209 TENSION VASCULAR HEADACHE: ICD-10-CM

## 2025-06-17 DIAGNOSIS — G43.109 MIGRAINE WITH AURA AND WITHOUT STATUS MIGRAINOSUS, NOT INTRACTABLE: ICD-10-CM

## 2025-06-17 DIAGNOSIS — G43.109 COMPLICATED MIGRAINE: ICD-10-CM

## 2025-06-17 RX ORDER — TOPIRAMATE 50 MG/1
TABLET, FILM COATED ORAL
Qty: 135 TABLET | Refills: 3 | OUTPATIENT
Start: 2025-06-17

## (undated) DEVICE — INFECTION CONTROL KIT SYS

## (undated) DEVICE — GARMENT,MEDLINE,DVT,INT,CALF,MED, GEN2: Brand: MEDLINE

## (undated) DEVICE — REM POLYHESIVE ADULT PATIENT RETURN ELECTRODE: Brand: VALLEYLAB

## (undated) DEVICE — TROCAR: Brand: KII FIOS FIRST ENTRY

## (undated) DEVICE — NEEDLE HYPO 25GA L1.5IN BVL ORIENTED ECLIPSE

## (undated) DEVICE — 40580 - THE PINK PAD - ADVANCED TRENDELENBURG POSITIONING KIT: Brand: 40580 - THE PINK PAD - ADVANCED TRENDELENBURG POSITIONING KIT

## (undated) DEVICE — SUTURE MCRYL SZ 5 0 L18IN ABSRB UD PC 5 L19MM 1 2 CIR Y822G

## (undated) DEVICE — DERMABOND SKIN ADH 0.7ML -- DERMABOND ADVANCED 12/BX

## (undated) DEVICE — MARYLAND JAW LAPAROSCOPIC SEALER/DIVIDER COATED: Brand: LIGASURE

## (undated) DEVICE — TROCARS: Brand: KII® BALLOON BLUNT TIP SYSTEM

## (undated) DEVICE — Device

## (undated) DEVICE — BAG SPEC REM 224ML W4XL6IN DIA10MM 1 HND GYN DISP ENDOPCH

## (undated) DEVICE — TOTAL TRAY, 16FR 10ML SIL FOLEY, URN: Brand: MEDLINE

## (undated) DEVICE — KIT,1200CC CANISTER,3/16"X6' TUBING: Brand: MEDLINE INDUSTRIES, INC.

## (undated) DEVICE — STRAP,POSITIONING,KNEE/BODY,FOAM,4X60": Brand: MEDLINE

## (undated) DEVICE — SYR 10ML LUER LOK 1/5ML GRAD --

## (undated) DEVICE — STERILE POLYISOPRENE POWDER-FREE SURGICAL GLOVES: Brand: PROTEXIS

## (undated) DEVICE — HANDLE LT SNAP ON ULT DURABLE LENS FOR TRUMPF ALC DISPOSABLE

## (undated) DEVICE — GOWN,PREVENTION PLUS,XL,ST,24/CS: Brand: MEDLINE

## (undated) DEVICE — RELOAD STPL L45MM H1-2.6MM MESENTERY THN TISS WHT GRIPPING

## (undated) DEVICE — (D)PREP SKN CHLRAPRP APPL 26ML -- CONVERT TO ITEM 371833

## (undated) DEVICE — SURGICAL PROCEDURE KIT GEN LAPAROSCOPY LF

## (undated) DEVICE — TROCAR: Brand: KII SLEEVE

## (undated) DEVICE — SUTURE SZ 0 27IN 5/8 CIR UR-6  TAPER PT VIOLET ABSRB VICRYL J603H

## (undated) DEVICE — STERILE POLYISOPRENE POWDER-FREE SURGICAL GLOVES WITH EMOLLIENT COATING: Brand: PROTEXIS

## (undated) DEVICE — EVAC SMOKE SEECLEAR XCL -- SEE CLEAR